# Patient Record
Sex: FEMALE | Race: WHITE | NOT HISPANIC OR LATINO | Employment: OTHER | URBAN - METROPOLITAN AREA
[De-identification: names, ages, dates, MRNs, and addresses within clinical notes are randomized per-mention and may not be internally consistent; named-entity substitution may affect disease eponyms.]

---

## 2021-07-07 LAB
ABO: NORMAL
RH TYPE: NORMAL

## 2023-05-03 PROBLEM — R04.2 HEMOPTYSIS: Status: ACTIVE | Noted: 2023-05-03

## 2023-05-03 PROBLEM — S62.336A CLOSED DISPLACED FRACTURE OF NECK OF RIGHT FIFTH METACARPAL BONE: Status: ACTIVE | Noted: 2023-05-03

## 2023-05-03 PROBLEM — J20.9 ACUTE BRONCHITIS: Status: RESOLVED | Noted: 2023-05-03 | Resolved: 2023-05-03

## 2023-05-03 PROBLEM — L21.9 SEBORRHEIC DERMATITIS OF SCALP: Status: ACTIVE | Noted: 2023-05-03

## 2023-05-03 PROBLEM — N73.9 PELVIC ABSCESS IN FEMALE: Status: ACTIVE | Noted: 2023-05-03

## 2023-05-03 PROBLEM — T81.49XA WOUND INFECTION AFTER SURGERY: Status: ACTIVE | Noted: 2023-05-03

## 2023-05-03 PROBLEM — A49.01 MSSA (METHICILLIN SUSCEPTIBLE STAPHYLOCOCCUS AUREUS) INFECTION: Status: ACTIVE | Noted: 2023-05-03

## 2023-05-03 PROBLEM — R63.5 WEIGHT GAIN: Status: ACTIVE | Noted: 2023-05-03

## 2023-05-03 PROBLEM — E55.9 HYPOVITAMINOSIS D: Status: ACTIVE | Noted: 2023-05-03

## 2023-05-03 PROBLEM — I97.89 NECROSIS OF SURGICAL WOUND (MULTI): Status: ACTIVE | Noted: 2023-05-03

## 2023-05-03 PROBLEM — M86.9: Status: ACTIVE | Noted: 2023-05-03

## 2023-05-03 PROBLEM — F31.9 BIPOLAR DEPRESSION (MULTI): Status: ACTIVE | Noted: 2023-05-03

## 2023-05-03 PROBLEM — S32.402A: Status: ACTIVE | Noted: 2023-05-03

## 2023-05-03 PROBLEM — I96 NECROSIS OF SURGICAL WOUND (MULTI): Status: ACTIVE | Noted: 2023-05-03

## 2023-05-03 PROBLEM — R33.9 URINARY RETENTION: Status: ACTIVE | Noted: 2023-05-03

## 2023-05-03 PROBLEM — M25.561 RIGHT KNEE PAIN: Status: ACTIVE | Noted: 2023-05-03

## 2023-05-03 PROBLEM — F17.210 TOBACCO DEPENDENCE DUE TO CIGARETTES: Status: RESOLVED | Noted: 2023-05-03 | Resolved: 2023-05-03

## 2023-05-03 PROBLEM — L30.9 ACUTE DERMATITIS: Status: RESOLVED | Noted: 2023-05-03 | Resolved: 2023-05-03

## 2023-05-03 PROBLEM — S72.009A FRACTURE, HIP (MULTI): Status: ACTIVE | Noted: 2023-05-03

## 2023-05-03 PROBLEM — M32.9 LUPUS (MULTI): Status: ACTIVE | Noted: 2023-05-03

## 2023-05-03 PROBLEM — R21 RASH: Status: RESOLVED | Noted: 2023-05-03 | Resolved: 2023-05-03

## 2023-05-03 RX ORDER — CYCLOBENZAPRINE HCL 5 MG
1 TABLET ORAL 3 TIMES DAILY PRN
COMMUNITY
Start: 2021-07-27 | End: 2023-07-13 | Stop reason: ALTCHOICE

## 2023-05-03 RX ORDER — ACETAMINOPHEN 500 MG
1 TABLET ORAL 2 TIMES DAILY
COMMUNITY
Start: 2021-07-23 | End: 2023-07-13 | Stop reason: ALTCHOICE

## 2023-05-03 RX ORDER — KETOCONAZOLE 20 MG/ML
1 SHAMPOO, SUSPENSION TOPICAL
COMMUNITY
Start: 2022-02-16 | End: 2024-05-08 | Stop reason: ALTCHOICE

## 2023-05-03 RX ORDER — ASPIRIN 325 MG
1 TABLET, DELAYED RELEASE (ENTERIC COATED) ORAL
COMMUNITY
Start: 2021-09-20 | End: 2023-07-13 | Stop reason: ALTCHOICE

## 2023-05-03 RX ORDER — ASPIRIN 81 MG/1
1 TABLET ORAL 2 TIMES DAILY
COMMUNITY
Start: 2021-09-20 | End: 2023-07-13 | Stop reason: ALTCHOICE

## 2023-05-03 RX ORDER — BENZONATATE 100 MG/1
CAPSULE ORAL SEE ADMIN INSTRUCTIONS
COMMUNITY
Start: 2021-06-29 | End: 2023-07-13 | Stop reason: ALTCHOICE

## 2023-06-28 ENCOUNTER — TELEPHONE (OUTPATIENT)
Dept: PRIMARY CARE | Facility: CLINIC | Age: 43
End: 2023-06-28
Payer: MEDICARE

## 2023-06-29 NOTE — TELEPHONE ENCOUNTER
Appt scheduled  
I do not see med in med list please advise  
Needs a refill on her Albuterol takes 1 as needed please send to [facility] in [place]   
General

## 2023-07-13 ENCOUNTER — OFFICE VISIT (OUTPATIENT)
Dept: PRIMARY CARE | Facility: CLINIC | Age: 43
End: 2023-07-13
Payer: MEDICARE

## 2023-07-13 VITALS
BODY MASS INDEX: 32.23 KG/M2 | HEIGHT: 69 IN | SYSTOLIC BLOOD PRESSURE: 110 MMHG | DIASTOLIC BLOOD PRESSURE: 60 MMHG | WEIGHT: 217.6 LBS | HEART RATE: 100 BPM | OXYGEN SATURATION: 97 % | RESPIRATION RATE: 16 BRPM

## 2023-07-13 DIAGNOSIS — Z13.220 SCREENING FOR LIPID DISORDERS: ICD-10-CM

## 2023-07-13 DIAGNOSIS — R06.02 EXERTIONAL SHORTNESS OF BREATH: ICD-10-CM

## 2023-07-13 DIAGNOSIS — R06.02 SOB (SHORTNESS OF BREATH) ON EXERTION: Primary | ICD-10-CM

## 2023-07-13 DIAGNOSIS — Z00.00 ANNUAL PHYSICAL EXAM: ICD-10-CM

## 2023-07-13 DIAGNOSIS — Z00.00 MEDICARE ANNUAL WELLNESS VISIT, SUBSEQUENT: Primary | ICD-10-CM

## 2023-07-13 DIAGNOSIS — Z12.31 ENCOUNTER FOR SCREENING MAMMOGRAM FOR MALIGNANT NEOPLASM OF BREAST: ICD-10-CM

## 2023-07-13 DIAGNOSIS — F17.290 VAPING NICOTINE DEPENDENCE, TOBACCO PRODUCT: ICD-10-CM

## 2023-07-13 DIAGNOSIS — E55.9 HYPOVITAMINOSIS D: ICD-10-CM

## 2023-07-13 DIAGNOSIS — Z13.29 SCREENING FOR THYROID DISORDER: ICD-10-CM

## 2023-07-13 DIAGNOSIS — Z71.89 ADVANCE DIRECTIVE DISCUSSED WITH PATIENT: ICD-10-CM

## 2023-07-13 DIAGNOSIS — D64.9 ANEMIA, UNSPECIFIED TYPE: ICD-10-CM

## 2023-07-13 DIAGNOSIS — R73.9 HYPERGLYCEMIA: ICD-10-CM

## 2023-07-13 PROCEDURE — 99214 OFFICE O/P EST MOD 30 MIN: CPT | Performed by: NURSE PRACTITIONER

## 2023-07-13 PROCEDURE — G0439 PPPS, SUBSEQ VISIT: HCPCS | Performed by: NURSE PRACTITIONER

## 2023-07-13 PROCEDURE — 1036F TOBACCO NON-USER: CPT | Performed by: NURSE PRACTITIONER

## 2023-07-13 PROCEDURE — 3008F BODY MASS INDEX DOCD: CPT | Performed by: NURSE PRACTITIONER

## 2023-07-13 PROCEDURE — 99406 BEHAV CHNG SMOKING 3-10 MIN: CPT | Performed by: NURSE PRACTITIONER

## 2023-07-13 PROCEDURE — 99497 ADVNCD CARE PLAN 30 MIN: CPT | Performed by: NURSE PRACTITIONER

## 2023-07-13 PROCEDURE — G0447 BEHAVIOR COUNSEL OBESITY 15M: HCPCS | Performed by: NURSE PRACTITIONER

## 2023-07-13 RX ORDER — QUETIAPINE FUMARATE 50 MG/1
0.5 TABLET, FILM COATED ORAL 3 TIMES DAILY PRN
COMMUNITY

## 2023-07-13 RX ORDER — ALBUTEROL SULFATE 0.83 MG/ML
2.5 SOLUTION RESPIRATORY (INHALATION) 4 TIMES DAILY PRN
Qty: 540 ML | Refills: 3 | Status: SHIPPED | OUTPATIENT
Start: 2023-07-13 | End: 2024-01-09

## 2023-07-13 RX ORDER — QUETIAPINE FUMARATE 400 MG/1
1 TABLET, FILM COATED ORAL NIGHTLY
COMMUNITY
Start: 2023-07-09

## 2023-07-13 ASSESSMENT — ANXIETY QUESTIONNAIRES
2. NOT BEING ABLE TO STOP OR CONTROL WORRYING: NOT AT ALL
1. FEELING NERVOUS, ANXIOUS, OR ON EDGE: NOT AT ALL
7. FEELING AFRAID AS IF SOMETHING AWFUL MIGHT HAPPEN: NOT AT ALL
GAD7 TOTAL SCORE: 0
4. TROUBLE RELAXING: NOT AT ALL
5. BEING SO RESTLESS THAT IT IS HARD TO SIT STILL: NOT AT ALL
6. BECOMING EASILY ANNOYED OR IRRITABLE: NOT AT ALL
3. WORRYING TOO MUCH ABOUT DIFFERENT THINGS: NOT AT ALL

## 2023-07-13 ASSESSMENT — ENCOUNTER SYMPTOMS
OCCASIONAL FEELINGS OF UNSTEADINESS: 0
DEPRESSION: 0
SHORTNESS OF BREATH: 1
LOSS OF SENSATION IN FEET: 0

## 2023-07-13 ASSESSMENT — PATIENT HEALTH QUESTIONNAIRE - PHQ9
2. FEELING DOWN, DEPRESSED OR HOPELESS: NOT AT ALL
1. LITTLE INTEREST OR PLEASURE IN DOING THINGS: NOT AT ALL
SUM OF ALL RESPONSES TO PHQ9 QUESTIONS 1 AND 2: 0

## 2023-07-13 ASSESSMENT — PAIN SCALES - GENERAL: PAINLEVEL: 4

## 2023-07-13 NOTE — PATIENT INSTRUCTIONS
I have a STAT X-rays for you to complete tomorrow. I have ordered albuterol and nebulizer supplies to use until your pulmonary function test and referral to consult with pulmonology. I have ordered screening mammogram.  Follow up in 3 months or as needed.

## 2023-07-14 NOTE — PROGRESS NOTES
"Subjective   Patient ID: Chloe Mclean is a 43 y.o. female who presents for Follow-up (Hard time breathing, insurance bre wants low dose ct scan).    Patient is accompanied by her elder sister following up for annual physical exam.  She presents with complaint of difficulties breathing.  Reports that the insurance company wants to get a little.  CT scan lung screening.  However, per the guidelines she does not qualify because she is just 43 years old.  Only individual between the ages of 50-77 qualifies for low-dose CT scan lungs  screening.  Patient reports that she has switched from smoking to vaping nicotine-containing products.  Reports that she has a nebulizer machine at home but does not have the liquid medication to use for it.  [facility].         Review of Systems   Respiratory:  Positive for shortness of breath.    All other systems reviewed and are negative.      Objective   /60 (BP Location: Right arm, Patient Position: Sitting, BP Cuff Size: Adult)   Pulse 100   Resp 16   Ht 1.753 m (5' 9\")   Wt 98.7 kg (217 lb 9.6 oz)   SpO2 97%   BMI 32.13 kg/m²     Physical Exam  Constitutional:       Appearance: Normal appearance.   HENT:      Head: Normocephalic and atraumatic.      Right Ear: External ear normal.      Left Ear: External ear normal.      Nose: Nose normal.      Mouth/Throat:      Mouth: Mucous membranes are moist.   Cardiovascular:      Rate and Rhythm: Normal rate and regular rhythm.      Pulses: Normal pulses.      Heart sounds: Normal heart sounds.   Pulmonary:      Effort: Pulmonary effort is normal.      Breath sounds: Normal breath sounds.   Abdominal:      General: Bowel sounds are normal.      Palpations: Abdomen is soft.   Musculoskeletal:      Cervical back: Neck supple.   Skin:     General: Skin is warm and dry.   Neurological:      Mental Status: She is alert and oriented to person, place, and time. "   Psychiatric:         Mood and Affect: Mood normal.         Behavior: Behavior normal.         Thought Content: Thought content normal.         Judgment: Judgment normal.         Assessment/Plan   Problem List Items Addressed This Visit       Hypovitaminosis D - Primary    Relevant Orders    Vitamin D 25-Hydroxy,Total     Other Visit Diagnoses       Exertional shortness of breath        Relevant Medications    albuterol 2.5 mg /3 mL (0.083 %) nebulizer solution    Other Relevant Orders    XR chest 2 views    Referral to Pulmonology    Follow Up In [facility] - PCP - Established    Screening for lipid disorders        Relevant Orders    Lipid panel    Screening for thyroid disorder        Encounter for screening mammogram for malignant neoplasm of breast        Relevant Orders    BI mammo bilateral screening tomosynthesis    Annual physical exam        Anemia, unspecified type        Relevant Orders    CBC    Vitamin B12    Hyperglycemia        Relevant Orders    Comprehensive Metabolic Panel

## 2023-07-17 ENCOUNTER — LAB (OUTPATIENT)
Dept: LAB | Facility: LAB | Age: 43
End: 2023-07-17
Payer: MEDICARE

## 2023-07-17 ENCOUNTER — TELEPHONE (OUTPATIENT)
Dept: PRIMARY CARE | Facility: CLINIC | Age: 43
End: 2023-07-17

## 2023-07-17 DIAGNOSIS — Z13.220 SCREENING FOR LIPID DISORDERS: ICD-10-CM

## 2023-07-17 DIAGNOSIS — E55.9 HYPOVITAMINOSIS D: ICD-10-CM

## 2023-07-17 DIAGNOSIS — E55.9 HYPOVITAMINOSIS D: Primary | ICD-10-CM

## 2023-07-17 LAB
CALCIDIOL (25 OH VITAMIN D3) (NG/ML) IN SER/PLAS: 20 NG/ML
CHOLESTEROL (MG/DL) IN SER/PLAS: 168 MG/DL (ref 0–199)
CHOLESTEROL IN HDL (MG/DL) IN SER/PLAS: 42.1 MG/DL
CHOLESTEROL/HDL RATIO: 4
LDL: 111 MG/DL (ref 0–99)
TRIGLYCERIDE (MG/DL) IN SER/PLAS: 75 MG/DL (ref 0–149)
VLDL: 15 MG/DL (ref 0–40)

## 2023-07-17 PROCEDURE — 80061 LIPID PANEL: CPT

## 2023-07-17 PROCEDURE — 36415 COLL VENOUS BLD VENIPUNCTURE: CPT

## 2023-07-17 PROCEDURE — 82306 VITAMIN D 25 HYDROXY: CPT

## 2023-07-17 RX ORDER — ERGOCALCIFEROL 1.25 MG/1
50000 CAPSULE ORAL
Qty: 12 CAPSULE | Refills: 2 | Status: SHIPPED | OUTPATIENT
Start: 2023-07-17 | End: 2024-04-12

## 2023-07-17 NOTE — TELEPHONE ENCOUNTER
----- Message from JAMSHID You-CNP sent at 7/17/2023  2:33 PM EDT -----  Please tell patient that her vitamin D level is low and I have prescribed vitamin D2 50,000 unit weekly for her to take.

## 2023-07-17 NOTE — TELEPHONE ENCOUNTER
Patient notified of vit D level and prescription is asking about CXR results performed today she stated please call with results [phone]

## 2023-07-18 NOTE — TELEPHONE ENCOUNTER
----- Message from JAMSHID You-CNP sent at 7/17/2023  7:24 PM EDT -----  Please tell patient that her mammogram result is normal and I recommend routine annual screening mammogram.

## 2023-09-11 ENCOUNTER — PATIENT OUTREACH (OUTPATIENT)
Dept: PRIMARY CARE | Facility: CLINIC | Age: 43
End: 2023-09-11
Payer: MEDICARE

## 2023-09-11 NOTE — PROGRESS NOTES
Pt declining TCM services at this time. Pt stating she is doing well and is set to see her dentist in the near future. Pt has no questions that needed addressed at this time.

## 2023-10-17 ENCOUNTER — APPOINTMENT (OUTPATIENT)
Dept: PRIMARY CARE | Facility: CLINIC | Age: 43
End: 2023-10-17
Payer: MEDICARE

## 2023-11-29 DIAGNOSIS — G47.33 OSA (OBSTRUCTIVE SLEEP APNEA): Primary | ICD-10-CM

## 2023-12-01 ENCOUNTER — APPOINTMENT (OUTPATIENT)
Dept: PULMONOLOGY | Facility: HOSPITAL | Age: 43
End: 2023-12-01
Payer: MEDICARE

## 2023-12-20 ENCOUNTER — CLINICAL SUPPORT (OUTPATIENT)
Dept: SLEEP MEDICINE | Facility: CLINIC | Age: 43
End: 2023-12-20
Payer: MEDICARE

## 2023-12-20 DIAGNOSIS — G47.30 SLEEP APNEA, UNSPECIFIED: ICD-10-CM

## 2023-12-20 DIAGNOSIS — R06.83 SNORING: ICD-10-CM

## 2023-12-20 DIAGNOSIS — G47.33 OSA (OBSTRUCTIVE SLEEP APNEA): ICD-10-CM

## 2023-12-20 PROCEDURE — 95806 SLEEP STUDY UNATT&RESP EFFT: CPT | Performed by: INTERNAL MEDICINE

## 2023-12-20 NOTE — PROGRESS NOTES
Type of Study: HOME SLEEP STUDY - NOMAD     The patient received equipment and instructions for use of the [employer] Nomad HSAT device. The patient was instructed how to apply the effort belts, cannula, thermistor. It was also explained how the Nomad and oximeter components work.  The patient was asked to record their sleep for an 8-hour period.     The patient was informed of their responsibility for the device and acknowledged this by signing the HSAT device contract. The patient was asked to return the device on 12/21/2023 by 10 AM to the Respiratory Care Department at [facility].     The patient was instructed to call 911 as usual for any medical- emergencies while at home.  The patient was also given a phone number for troubleshooting when using the device in case there were additional questions.

## 2023-12-22 ENCOUNTER — TELEPHONE (OUTPATIENT)
Dept: PULMONOLOGY | Facility: HOSPITAL | Age: 43
End: 2023-12-22
Payer: MEDICARE

## 2023-12-22 NOTE — TELEPHONE ENCOUNTER
Patient acknowledged understanding and was given a follow up appointment at this time.    ----- Message from Edilberto King MD sent at 12/22/2023  2:32 PM EST -----  Please advise patient that sleep study did not show significant sleep apnea or oxygen desaturations. Mild snoring is noted. Recommend weight management in the meanwhile and keep follow up appointment to further discuss. No indication at present for CPAP.

## 2024-01-29 ENCOUNTER — APPOINTMENT (OUTPATIENT)
Dept: PULMONOLOGY | Facility: HOSPITAL | Age: 44
End: 2024-01-29
Payer: MEDICARE

## 2024-03-20 ENCOUNTER — TELEPHONE (OUTPATIENT)
Dept: PRIMARY CARE | Facility: CLINIC | Age: 44
End: 2024-03-20
Payer: MEDICARE

## 2024-03-21 DIAGNOSIS — M25.552 LEFT HIP PAIN: Primary | ICD-10-CM

## 2024-04-01 ENCOUNTER — OFFICE VISIT (OUTPATIENT)
Dept: ORTHOPEDIC SURGERY | Facility: CLINIC | Age: 44
End: 2024-04-01
Payer: MEDICARE

## 2024-04-01 ENCOUNTER — HOSPITAL ENCOUNTER (OUTPATIENT)
Dept: RADIOLOGY | Facility: HOSPITAL | Age: 44
Discharge: HOME | End: 2024-04-01
Payer: MEDICARE

## 2024-04-01 ENCOUNTER — PREP FOR PROCEDURE (OUTPATIENT)
Dept: ORTHOPEDIC SURGERY | Facility: CLINIC | Age: 44
End: 2024-04-01

## 2024-04-01 VITALS — WEIGHT: 230 LBS | HEIGHT: 69 IN | BODY MASS INDEX: 34.07 KG/M2

## 2024-04-01 DIAGNOSIS — M16.12 PRIMARY OSTEOARTHRITIS OF LEFT HIP: Primary | ICD-10-CM

## 2024-04-01 DIAGNOSIS — M25.552 LEFT HIP PAIN: ICD-10-CM

## 2024-04-01 DIAGNOSIS — M25.352 INSTABILITY OF LEFT HIP JOINT: ICD-10-CM

## 2024-04-01 PROCEDURE — 99204 OFFICE O/P NEW MOD 45 MIN: CPT | Performed by: ORTHOPAEDIC SURGERY

## 2024-04-01 PROCEDURE — 73502 X-RAY EXAM HIP UNI 2-3 VIEWS: CPT | Mod: LEFT SIDE | Performed by: RADIOLOGY

## 2024-04-01 PROCEDURE — 73502 X-RAY EXAM HIP UNI 2-3 VIEWS: CPT | Mod: LT

## 2024-04-01 PROCEDURE — 3008F BODY MASS INDEX DOCD: CPT | Performed by: ORTHOPAEDIC SURGERY

## 2024-04-01 ASSESSMENT — PAIN - FUNCTIONAL ASSESSMENT: PAIN_FUNCTIONAL_ASSESSMENT: 0-10

## 2024-04-01 ASSESSMENT — PAIN SCALES - GENERAL: PAINLEVEL_OUTOF10: 4

## 2024-04-01 NOTE — PROGRESS NOTES
PRIMARY CARE PHYSICIAN: MARTY You  REFERRING PROVIDER: JAMSHID You-CLEMENCIA  [address], [address]  [place] [address]     CONSULT ORTHOPAEDIC: Hip Evaluation        ASSESSMENT & PLAN    IMPRESSION:  1.  Posttraumatic arthritis of the left hip    PLAN:  Discussed the patient findings above.  Reviewed x-rays with her.  She does have severe arthritic changes in the left hip with underlying hardware from previous fracture fixation of acetabular injury.  We discussed that given the severity of her changes that there is nothing short of a hip replacement that would give her the most predictable outcome at this point.  We did additionally discuss that with her retained hardware would recommend doing the surgery with robotic assistance to help decrease the removal of retained metal hardware.  She does feel that her quality of life is significantly affected at this time would like to proceed with surgical invention.    Chloe Mclean has radiographic and physical exam evidence of degenerative joint disease and wishes to pursue surgery. The patient appears to have sufficient symptoms to warrant surgical intervention and is an appropriate candidate for left Total Hip Arthroplasty with possible hardware removal as evidenced by six months of unsuccessful non-operative treatment as outlined in the HPI, progressive symptoms including pain impacting sleep or causing fatigue, pain impacting work, pain worsened with weight bearing, and pain limiting ability to stay fit and healthy.     We had a lengthy discussion regarding the risk and benefit of surgery, the alternatives, limitations, and personnel involved. The include but are not limited to infection, persistent pain, instability, nerve injury, blood clots, and medical complications. We also discussed the pre-operative course, surgery itself, and rehabilitation. Perioperative blood management and transfusion issues were  discussed, and options clearly outlined. The patient consented to the use of allogenic blood if medically necessary.     The patient has elected to schedule surgery at this time or intents to call the office with a surgical date. Shared decision making occurred while obtaining informed consent. The patient with be scheduled for a pre-operative education class. The patient will be ordered appropriate preoperative labs to be completed for preadmission testing.     Robotic Assisted Surgery: Yes. The use of robotic assisted surgery was discussed with the patient.  The risk, benefits were discussed regarding this.  Patient consented for this procedure.  We discussed specifically placement of pins and arrays for navigation during surgery and to help with the robotic assistance.  We discussed the use of an additional bandage to cover the pin sites.  We also reviewed that they may have some slight pain at the placement of pin sites that should improve in the same fashion as their general recovery of the joint replacement.    We discussed the term robot, when used to describe the PASTOR system, refers to the [employer] robotic arm. The PASTOR System is not a robot, but a surgeon-controlled robotic-arm assisted device.      - Preoperative Consults: PAT Clearance, pulmonary clearance  - Disposition: Extended recovery  - Anesthesia Plan: Spinal, local  - Implants: Lubna Pastor, Synthes screw removal available  - Physical Therapy Plan: Home  - [id number]: Yes  - Surgical Approach: Direct superior  - DVT PPx: Xarelto due to history of blood clots after her right knee replacement    Risk Assessment for Post-Op Antibiotics   -Postoperative anticoagulation use    I hereby indicate that these comorbidities may have a detrimental effect on this arthroplasty.   - Previous ORIF hip [M16.51, M16.52 Unilateral post-traumatic osteoarthritis right hip, Unilateral post-traumatic osteoarthritis left hip]          SUBJECTIVE  CHIEF COMPLAINT:   Chief  Complaint   Patient presents with    Left Hip - Pain        HPI: Chloe Mclean is a 44 y.o. patient. Chloe Mclean has had progressive problems with the left hip over the past 3 years. They do report any trauma, after she sustained a pelvic fracture 3 years ago. They do report any constant or progressive numbness or tingling in their legs. Their symptoms are interfering with activities which include pain on the lateral side of her hip and into her groin, but also around to her butt.  She has pain getting up from a chair and walking up and down stairs.   She states the instability is getting worse     FUNCTIONAL STATUS: limited on a day-to-day basis  AMBULATORY STATUS:  walker as needed   PREVIOUS TREATMENTS:  tylenol and ibuprofen  HISTORY OF SURGERY ON AFFECTED HIP(S): Yes Pelvic fracture 07/2021  BACK PAIN REPORTED: No       REVIEW OF SYSTEMS  Constitutional: See HPI for pain assessment, No significant weight loss, recent trauma  Cardiovascular: No chest pain, shortness of breath  Respiratory: No difficulty breathing, cough  Gastrointestinal: No nausea, vomiting, diarrhea, constipation  Musculoskeletal: Noted in HPI, positive for pain, restricted motion, stiffness  Integumentary: No rashes, easy bruising, redness   Neurological: no numbness or tingling in extremities, no gait disturbances   Psychiatric: No mood changes, memory changes, social issues  Heme/Lymph: no excessive swelling, easy bruising, excessive bleeding  ENT: No hearing changes  Eyes: No vision changes    Past Medical History:   Diagnosis Date    Acute bronchitis 05/03/2023    Acute dermatitis 05/03/2023        Allergies   Allergen Reactions    Iodine Unknown    Latex Rash        Past Surgical History:   Procedure Laterality Date    OTHER SURGICAL HISTORY  11/05/2019    Appendectomy    OTHER SURGICAL HISTORY  11/05/2019    Hysterectomy    OTHER SURGICAL HISTORY  11/05/2019    Gallbladder surgery    OTHER SURGICAL HISTORY  07/23/2021    Lung  surgery    OTHER SURGICAL HISTORY  07/23/2021    Hip surgery        No family history on file.     Social History     Socioeconomic History    Marital status: Single     Spouse name: Not on file    Number of children: Not on file    Years of education: Not on file    Highest education level: Not on file   Occupational History    Not on file   Tobacco Use    Smoking status: Former     Types: Cigarettes    Smokeless tobacco: Never    Tobacco comments:     Vapping nicotine containing products    Vaping Use    Vaping Use: Every day    Substances: Nicotine    Devices: Disposable   Substance and Sexual Activity    Alcohol use: Never    Drug use: Never    Sexual activity: Not on file   Other Topics Concern    Not on file   Social History Narrative    Not on file     Social Determinants of Health     Financial Resource Strain: Not on file   Food Insecurity: Not on file   Transportation Needs: Not on file   Physical Activity: Not on file   Stress: Not on file   Social Connections: Not on file   Intimate Partner Violence: Not on file   Housing Stability: Not on file        CURRENT MEDICATIONS:   Current Outpatient Medications   Medication Sig Dispense Refill    albuterol 2.5 mg /3 mL (0.083 %) nebulizer solution Take 3 mL (2.5 mg) by nebulization 4 times a day as needed for wheezing or shortness of breath. 540 mL 3    ergocalciferol (Vitamin D-2) 1.25 MG (25784 UT) capsule Take 1 capsule (50,000 Units) by mouth 1 (one) time per week. 12 capsule 2    ketoconazole (NIZOral) 2 % shampoo 1 Application once daily.      QUEtiapine (SEROquel) 400 mg tablet Take 1 tablet (400 mg) by mouth once daily at bedtime.      QUEtiapine (SEROquel) 50 mg tablet Take 1 tablet (50 mg) by mouth 3 times a day as needed.       No current facility-administered medications for this visit.        OBJECTIVE    PHYSICAL EXAM      6/29/2021     4:02 PM 7/23/2021    11:19 AM 8/8/2021    12:19 PM 10/14/2021     4:16 PM 10/29/2021    10:28 AM 7/13/2023      "5:19 PM 7/31/2023    11:40 AM   Vitals   Systolic 112 110 119  116 110 124   Diastolic 80 72 79  87 60 78   Heart Rate 107 98 90  90 100 114   Temp   36.9 °C (98.4 °F)    36.4 °C (97.5 °F)   Resp 16 16 16   16 18   Height (in) 1.753 m (5' 9\") 1.753 m (5' 9\")  1.752 m (5' 8.98\") 1.753 m (5' 9\") 1.753 m (5' 9\") 1.753 m (5' 9\")   Weight (lb) 234   229.94 240 217.6 219   BMI 34.56 kg/m2 34.56 kg/m2  33.98 kg/m2 35.44 kg/m2 32.13 kg/m2 32.34 kg/m2   BSA (m2) 2.27 m2 2.27 m2  2.25 m2 2.3 m2 2.19 m2 2.2 m2   Visit Report      Report       There is no height or weight on file to calculate BMI.    GENERAL: A/Ox3, NAD. Appears healthy, well nourished  PSYCHIATRIC: Mood stable, appropriate memory recall  EYES: EOM intact, no scleral icterus  CARDIAC: regular rate  LUNGS: Breathing non-labored  SKIN: no erythema, rashes, or ecchymoses     MUSCULOSKELETAL:  Laterality: left Hip Exam  -Anteriorly based incision is well-healed no surrounding erythema or wound breakdown  - ROM, Extension: full, no flexion contracture  - Strength: Abduction 5/5 against resitance, Flexion 5/5  - Palpation: mild TTP along greater trochanter  - Log roll/IR exam: painful and limited motion. Pain with hip flexion past 90 degrees and internal rotation  - Straight leg raise: negative  - EHL/PF/DF motor intact  - Gait: antalgic to arthritic side, negative Trendelenburg gait   - Special Tests: none performed    NEUROVASCULAR:  - Neurovascular Status: sensation intact to light touch distally  - Capillary refill brisk at extremities, Bilateral dorsalis pedis pulse 2+            IMAGING:  Multiple views of the affected left hip(s) demonstrate: Previous hardware fixation of acetabular fracture with no signs of nonunion or malunion.  Severe arthritic changes on the left hip with complete joint space narrowing, subchondral sclerosis, subchondral cystic change and large osteophytic change..   X-rays were personally reviewed and interpreted by me.  Radiology reports " were reviewed by me as well, if readily available at the time.        Ramya Rendon DO  Attending Surgeon  Joint Replacement and Adult Reconstructive Surgery  [place], [place]

## 2024-04-05 ENCOUNTER — APPOINTMENT (OUTPATIENT)
Dept: PULMONOLOGY | Facility: HOSPITAL | Age: 44
End: 2024-04-05
Payer: MEDICARE

## 2024-04-08 ENCOUNTER — OFFICE VISIT (OUTPATIENT)
Dept: PULMONOLOGY | Facility: HOSPITAL | Age: 44
End: 2024-04-08
Payer: MEDICARE

## 2024-04-08 VITALS
DIASTOLIC BLOOD PRESSURE: 74 MMHG | HEIGHT: 68 IN | TEMPERATURE: 96.9 F | WEIGHT: 238.6 LBS | BODY MASS INDEX: 36.16 KG/M2 | SYSTOLIC BLOOD PRESSURE: 111 MMHG | OXYGEN SATURATION: 97 % | RESPIRATION RATE: 16 BRPM | HEART RATE: 110 BPM

## 2024-04-08 DIAGNOSIS — J30.2 SEASONAL ALLERGIC RHINITIS, UNSPECIFIED TRIGGER: ICD-10-CM

## 2024-04-08 DIAGNOSIS — F12.90 MARIJUANA SMOKER: ICD-10-CM

## 2024-04-08 DIAGNOSIS — F17.290 VAPING NICOTINE DEPENDENCE, TOBACCO PRODUCT: ICD-10-CM

## 2024-04-08 DIAGNOSIS — J44.9 CHRONIC OBSTRUCTIVE PULMONARY DISEASE, UNSPECIFIED COPD TYPE (MULTI): Primary | ICD-10-CM

## 2024-04-08 DIAGNOSIS — J45.40 MODERATE PERSISTENT ASTHMA WITHOUT COMPLICATION (HHS-HCC): ICD-10-CM

## 2024-04-08 PROCEDURE — 99214 OFFICE O/P EST MOD 30 MIN: CPT | Performed by: INTERNAL MEDICINE

## 2024-04-08 PROCEDURE — 1036F TOBACCO NON-USER: CPT | Performed by: INTERNAL MEDICINE

## 2024-04-08 PROCEDURE — 3008F BODY MASS INDEX DOCD: CPT | Performed by: INTERNAL MEDICINE

## 2024-04-08 RX ORDER — ALBUTEROL SULFATE 90 UG/1
2 AEROSOL, METERED RESPIRATORY (INHALATION) EVERY 4 HOURS PRN
Qty: 8.5 G | Refills: 11 | Status: SHIPPED | OUTPATIENT
Start: 2024-04-08 | End: 2025-04-08

## 2024-04-08 RX ORDER — FLUTICASONE PROPIONATE AND SALMETEROL 250; 50 UG/1; UG/1
1 POWDER RESPIRATORY (INHALATION)
Qty: 1 EACH | Refills: 11 | Status: SHIPPED | OUTPATIENT
Start: 2024-04-08

## 2024-04-08 ASSESSMENT — COLUMBIA-SUICIDE SEVERITY RATING SCALE - C-SSRS
2. HAVE YOU ACTUALLY HAD ANY THOUGHTS OF KILLING YOURSELF?: NO
1. IN THE PAST MONTH, HAVE YOU WISHED YOU WERE DEAD OR WISHED YOU COULD GO TO SLEEP AND NOT WAKE UP?: NO
6. HAVE YOU EVER DONE ANYTHING, STARTED TO DO ANYTHING, OR PREPARED TO DO ANYTHING TO END YOUR LIFE?: NO

## 2024-04-08 ASSESSMENT — ENCOUNTER SYMPTOMS
OCCASIONAL FEELINGS OF UNSTEADINESS: 0
LOSS OF SENSATION IN FEET: 0
COUGH: 1
DEPRESSION: 0

## 2024-04-08 ASSESSMENT — PATIENT HEALTH QUESTIONNAIRE - PHQ9
1. LITTLE INTEREST OR PLEASURE IN DOING THINGS: NOT AT ALL
SUM OF ALL RESPONSES TO PHQ9 QUESTIONS 1 AND 2: 0
2. FEELING DOWN, DEPRESSED OR HOPELESS: NOT AT ALL

## 2024-04-08 NOTE — PROGRESS NOTES
Subjective   Patient ID: Chloe Mclean is a 44 y.o. female who presents for COPD (Patient here for follow up visit. Patient states her breathing is fine. Patient admits to an occasional dry cough. Patient denies any oxygen or cpap use at home. Patient vapes every day. Patient going for hip surgery needing clearance. Patient denies use of albuterol. Patient has no other concerns for today.).  HPI  Ms. Mclean is a 44 yo female with PMH of COPD is here to establish with pulmonary. She states that she was seeing Dr. Ruelas in the past. She started smoking at age of 11 and smoked 1ppd and up to 2 ppd for 10 years but quit in November 2022. She is now vaping instead with nicotine. She does not smoke THC and does not do any illicit drugs. She denies drinking alcohol. 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 She states she had pneumothorax and states required surgery in July 2021. She gets SOB with steps and strenuous activity. She has occasional cough and sometimes productive but usually dry. Hot shower bring up her cough with dark phlegm. She also states was diagnosed with Asthma as a child. Her triggers are usually heat, humidity, dust and smoke. She states she used to get Remicade with Dr. Juarez for psoriatic arthritis. She snores endorsed by her friend and has EDS and fatigue too. She states she has DASHA and was given CPAP but did not use it. She has lost about 90 lbs. She was [facility] at [facility] for pneumothorax in 2021.     Patient is here for follow-up.  She reports she is doing well and has not even taken Symbicort for couple months.  She denies shortness of breath.  She has occasional cough and postnasal drip.  She has not been needing Flonase however.  She had a home sleep study that only showed primary snoring.  She denies any sleepiness or tiredness at this time.    Review of Systems   HENT:  Positive for postnasal drip.    Respiratory:  Positive for cough.     All other systems reviewed and are negative.      Objective   Physical Exam  Vitals and nursing note reviewed.   Constitutional:       Appearance: Normal appearance.   HENT:      Head: Normocephalic.      Nose: Nose normal.      Mouth/Throat:      Pharynx: Oropharynx is clear.   Eyes:      Extraocular Movements: Extraocular movements intact.      Conjunctiva/sclera: Conjunctivae normal.      Pupils: Pupils are equal, round, and reactive to light.   Cardiovascular:      Rate and Rhythm: Normal rate and regular rhythm.      Pulses: Normal pulses.      Heart sounds: Normal heart sounds.   Pulmonary:      Effort: Pulmonary effort is normal.      Breath sounds: Normal breath sounds.   Abdominal:      General: Bowel sounds are normal.      Palpations: Abdomen is soft.   Musculoskeletal:         General: Normal range of motion.      Cervical back: Normal range of motion.   Skin:     General: Skin is warm.   Neurological:      General: No focal deficit present.      Mental Status: She is alert and oriented to person, place, and time. Mental status is at baseline.   Psychiatric:         Mood and Affect: Mood normal.         Behavior: Behavior normal.       Assessment/Plan   1. COPD  2. Asthma  3. DASHA  4. Allergic rhinitis  5. Obesity  6. Hx of pneumothorax  7. Marijuana smoking  8. Nicotine vaping     Plan:  Patient reports cough & GREEN are controlled on symbicort although she is not regular with it. She has not needed albuterol inhaler. She is here for preop clearance too.     -PFTs July 2023: FEV1 79%, BDR in mid expiratory flow rates, % and %.   -CXR with hyperinflation and air trapping  -Recommend take Symbicort daily. Ventolin prn.   -Discussed HST: primary snoring. Reports EDS and fatigue is improved. Discussed that if recurs, should get an in-lab PSG.  -Take Flonase nasal spray for allergic rhinitis  -weight management counseling  -counseled to stop VAPING asap.   -LDCT chest starting at age of 48.      Respiratory symptoms are scant and occ cough is related to vaping, allergic rhinitis and underlying COPD and asthma.  She does not have shortness of breath.  She is going to get her hip replacement.  She is mild to moderate risk for perioperative respiratory complications including pneumonia, hypoxia, worsening cough or shortness of breath.  She understands that but would like to proceed due to significant hip pain.  There is no contraindication to proceed with surgery as benefit outweigh the risks.  I did advise her to stop vaping.  I advised her to start Symbicort and take it regularly to reduce risk of postoperative bronchospasm and respiratory symptoms.

## 2024-04-08 NOTE — PATIENT INSTRUCTIONS
1. Please take Symbicort 2 puffs in the morning and in the evening.  2. Take flonase nasal spray 1 in each nostril daily.   3. Take albuterol inhaler as needed for shortness of breath.  4. Please STOP VAPING as discussed.   5. Follow up with  in 1 year.

## 2024-04-29 NOTE — PROGRESS NOTES
Thank you for attending our Joint Replacement class today in preparation for your upcoming surgery.  Topics discussed include:    MyChart Enrollment  Communication with Care Team  My Chart is the best form of communication to reach all of your caregivers  You can send messages to specific care givers, or a care team  Continued Education  You will be enrolled in a Total Joint Replacement care plan to receive additional education before and after surgery  You can review a short recording of the class content  Access to Medical Records  You can access test results, office notes, appointments, etc.  You can connect to other healthcare systems who use [employer] ([facility], [facility], [facility], etc.)  [facility]  Program Information  Consent to Enroll    Background/Understanding of Joint Replacement Surgery  Potential for same day discharge  Any questions or concerns to be directed to the surgeon's office    How to Prepare for Surgery  Use of Nicotine Products/Smoking  Stop several weeks before surgery  Such products slow down the healing process and increase risk of post-op infection and complications  Clearance for Surgery  Medical Clearance by Specialists  Dental Clearance  Cracked/Broken/Loose teeth left untreated may postpone surgery  The importance of post-op antibiotics for dental visits per surgeon protocol  Preadmission Testing  **Potential for postponed surgery if appropriate clearance is not obtained  Medication Instruction  Follow instructions provided by the doctor who prescribes your medication (typically, but not limited to cardiologist)  Preadmission testing will provide additional instructions during your appointment on what to stop and what to take as you get closer to surgery  For clarification of these instructions, please call preadmission testing directly - [phone]  Tips for Preparing the home for discharge from the hospital  Care Partner  Requirement for surgery, the patient must have a plan to have  help at home  Potential for postponed surgery if plan for home support cannot be established  How the care partner can help after surgery  CHG Body Wash/Mouth Wash  Follow the instructions given at preadmission testing  Body wash is to be used on the body and hair for 5 washes  Mouthwash is to be used the night before and morning of surgery  **This is a system-wide protocol developed by infectious disease professionals, we will not alter our recommendations for those with sensitive skin or those who have special hair needs.  Please follow the instructions as they are written as this will provide the best infection prevention measures for surgery.  Should you have an allergy to one of the products, please discuss with your preadmission team**    What to Expect in the Hospital/At Home  Morning of Surgery NPO Guidelines  Nothing to eat after midnight  Water can be consumed up to 2 hours prior to arrival  Surgical and Post-Surgical Care Team  Surgical Team  Anesthesia Team  Nursing  Physical Therapy  Care Coordinating  Pharmacy  Hospital Arrival Instructions  Arrive at the time provided to you  Consider traffic patterns (rush-hour) based on arrival time  Have arrangements made for a ride home  If discharging same day, care partner should remain at the hospital  Recovering after Surgery  Recovery Room - Visitors are not brought back  Transition to hospital room - 2nd Floor, Visitors will be directed to your room  The presence of and strategies for controlling surgical pain and swelling  The importance of early mobility  Side effects after surgery  What to expect if staying overnight    Discharge Planning  The intended plan for discharge will be for patients to discharge home  All patients require a care partner (family, friend, neighbor, etc.) to stay with the patient for the first few nights after surgery  The inability to secure help at home will postpone surgery  Home Care Services set up per surgeon order  Physical  Therapy  Occupational Therapy  **If desired, private duty care can be arranged by the patient ahead of time**  Outpatient Physical Therapy per surgeon order    Recovering at Home  Wound Care  Follow wound care instructions found in your discharge paperwork  Bandage is water-resistant and you may shower with the bandage  Do not scrub directly over the bandage  Do not submerge in water until cleared (bathtub, hot tub, pool, etc.)    Post-Op Risk Prevention  Infection Prevention  Promptly seek treatment for any infections post-operatively  Routine dental visits must be postponed for 3 months after surgery  Your surgeon may require antibiotics prior to future dental visits  Any concerns for infection not related directly to the knee or the hip should be managed by your primary care provider  Blood Clots  Be sure to complete the course of blood thinning medication as prescribed by your surgeon  Movement every 1-2 hours during the day is encouraged to prevent blood clots  Monitor for signs of blood clots  Wear compression stockings as prescribed by your surgeon  Constipation  Constipation is common following surgery  Drink plenty of fluids  Take stool softener/laxative as prescribed by your surgeon  Move around frequently  Eat foods high in fiber  Fall Prevention  Prepare home ahead of time to clear space to move with walker  Remove throw rugs and electrical cords from walkways  Install railings near any stairways with more than 2 steps  Use night lights for increased visibility at night  Continue to use your assistive device until cleared by surgeon or physical therapy  Dislocation Prevention - Not all procedures will have dislocation precautions  Follow dislocation precautions provided by your surgeon  It is OK to resume sexual activity about 6 weeks following surgery  Be sure to follow any dislocation precautions assigned    Durable Medical Equipment  Cold Therapy  Breg Cold Therapy Machines  Ice/Gel Packs  Assistive  Devices  Folding Walker with Wheels (in the front only)  No Rollators  Crutches if approved by Physical Therapy and Surgeon after surgery  Hip Kits  Raised Toilet Seats  Additional Compression Stockings    Joint Preservation  Healthy Activities when Cleared  Walking  Swimming  Bike Riding  Activities to Avoid  Refrain from repetitive motions which have a high impact on the joint  Gradual Progression  Progress activity slowly, listen to your body  Common Findings - NORMAL after surgery  Clicking/Grinding  Numbness near incision    Physical Therapy  Prehabilitation exercises  START TODAY ON BOTH LEGS  Surgery Specific Precautions  Follow surgery specific precautions found in your discharge paperwork    Follow-Up Visit  All patients will see their surgeon for a follow up visit after surgery  The visit may range from 2-6 weeks after surgery and is surgeon specific      Please don't hesitate to reach out if you have any additional questions or concerns.    Katja Gentile MBA, BSN, RN-BC  KAILA JoshiN, RN  [facility]  [facility]   [phone]

## 2024-05-01 ENCOUNTER — APPOINTMENT (OUTPATIENT)
Dept: PREADMISSION TESTING | Facility: HOSPITAL | Age: 44
End: 2024-05-01
Payer: MEDICARE

## 2024-05-01 ENCOUNTER — HOSPITAL ENCOUNTER (OUTPATIENT)
Dept: RADIOLOGY | Facility: HOSPITAL | Age: 44
Discharge: HOME | End: 2024-05-01
Payer: MEDICARE

## 2024-05-01 ENCOUNTER — EDUCATION (OUTPATIENT)
Dept: ORTHOPEDIC SURGERY | Facility: HOSPITAL | Age: 44
End: 2024-05-01
Payer: MEDICARE

## 2024-05-01 DIAGNOSIS — M25.352 INSTABILITY OF LEFT HIP JOINT: ICD-10-CM

## 2024-05-01 PROCEDURE — 73700 CT LOWER EXTREMITY W/O DYE: CPT | Mod: LT

## 2024-05-07 ENCOUNTER — TELEPHONE (OUTPATIENT)
Dept: ORTHOPEDIC SURGERY | Facility: HOSPITAL | Age: 44
End: 2024-05-07
Payer: MEDICARE

## 2024-05-07 NOTE — TELEPHONE ENCOUNTER
1 amp calcium given at this time        Nathalie Epsaña RN  03/05/21 4914 Thank you for taking my call today.  All questions were answered at the time of the call, but please feel free to reach out to me via MyChart or phone, [phone], with any new questions or concerns.       We confirmed that you opted to enroll in our [employer] program so your discharge prescriptions will be available to take home at the time of discharge.  Please bring any prescription insurance coverage with you on the morning of surgery so that we can enter the information into our system.     We confirmed that your plan would be to Stay Overnight.    We confirmed that you have DME needed for recovery.     Use the provided body wash for 4 days before surgery and complete a 5th shower on the morning of surgery, this includes your body and hair.  Follow the directions as provided during preadmission testing.  The mouth wash will be used the night before and the morning of surgery.       As a reminder, if you do not hear from our team, please call [phone] between 2pm and 3pm the business day before your surgery to confirm your arrival time and details.        Please don't hesitate to reach out with additional questions or concerns.    Katja Gentile MBA, BSN, RN-BC  KAILA JoshiN, RN  [facility]  [facility]  [phone]

## 2024-05-08 ENCOUNTER — LAB (OUTPATIENT)
Dept: LAB | Facility: LAB | Age: 44
End: 2024-05-08
Payer: MEDICARE

## 2024-05-08 ENCOUNTER — PRE-ADMISSION TESTING (OUTPATIENT)
Dept: PREADMISSION TESTING | Facility: HOSPITAL | Age: 44
End: 2024-05-08
Payer: MEDICARE

## 2024-05-08 VITALS
OXYGEN SATURATION: 99 % | SYSTOLIC BLOOD PRESSURE: 134 MMHG | DIASTOLIC BLOOD PRESSURE: 82 MMHG | RESPIRATION RATE: 16 BRPM | HEIGHT: 68 IN | BODY MASS INDEX: 36.9 KG/M2 | HEART RATE: 94 BPM | TEMPERATURE: 98.1 F | WEIGHT: 243.5 LBS

## 2024-05-08 DIAGNOSIS — Z01.818 PREOPERATIVE TESTING: ICD-10-CM

## 2024-05-08 DIAGNOSIS — R73.9 ELEVATED BLOOD SUGAR: ICD-10-CM

## 2024-05-08 DIAGNOSIS — D64.9 ANEMIA, UNSPECIFIED TYPE: ICD-10-CM

## 2024-05-08 DIAGNOSIS — R73.9 HYPERGLYCEMIA: ICD-10-CM

## 2024-05-08 DIAGNOSIS — Z01.818 PREOPERATIVE TESTING: Primary | ICD-10-CM

## 2024-05-08 LAB
ALBUMIN SERPL BCP-MCNC: 4.4 G/DL (ref 3.4–5)
ALP SERPL-CCNC: 105 U/L (ref 33–110)
ALT SERPL W P-5'-P-CCNC: 8 U/L (ref 7–45)
ANION GAP SERPL CALC-SCNC: 14 MMOL/L (ref 10–20)
AST SERPL W P-5'-P-CCNC: 12 U/L (ref 9–39)
BASOPHILS # BLD AUTO: 0.03 X10*3/UL (ref 0–0.1)
BASOPHILS NFR BLD AUTO: 0.4 %
BILIRUB SERPL-MCNC: 0.5 MG/DL (ref 0–1.2)
BUN SERPL-MCNC: 10 MG/DL (ref 6–23)
CALCIUM SERPL-MCNC: 9.5 MG/DL (ref 8.6–10.3)
CHLORIDE SERPL-SCNC: 103 MMOL/L (ref 98–107)
CO2 SERPL-SCNC: 27 MMOL/L (ref 21–32)
CREAT SERPL-MCNC: 0.66 MG/DL (ref 0.5–1.05)
EGFRCR SERPLBLD CKD-EPI 2021: >90 ML/MIN/1.73M*2
EOSINOPHIL # BLD AUTO: 0.25 X10*3/UL (ref 0–0.7)
EOSINOPHIL NFR BLD AUTO: 3.4 %
ERYTHROCYTE [DISTWIDTH] IN BLOOD BY AUTOMATED COUNT: 12.8 % (ref 11.5–14.5)
EST. AVERAGE GLUCOSE BLD GHB EST-MCNC: 103 MG/DL
GLUCOSE SERPL-MCNC: 89 MG/DL (ref 74–99)
HBA1C MFR BLD: 5.2 %
HCT VFR BLD AUTO: 39.7 % (ref 36–46)
HGB BLD-MCNC: 13 G/DL (ref 12–16)
IMM GRANULOCYTES # BLD AUTO: 0.01 X10*3/UL (ref 0–0.7)
IMM GRANULOCYTES NFR BLD AUTO: 0.1 % (ref 0–0.9)
LYMPHOCYTES # BLD AUTO: 2.13 X10*3/UL (ref 1.2–4.8)
LYMPHOCYTES NFR BLD AUTO: 29 %
MCH RBC QN AUTO: 28.6 PG (ref 26–34)
MCHC RBC AUTO-ENTMCNC: 32.7 G/DL (ref 32–36)
MCV RBC AUTO: 87 FL (ref 80–100)
MONOCYTES # BLD AUTO: 0.37 X10*3/UL (ref 0.1–1)
MONOCYTES NFR BLD AUTO: 5 %
NEUTROPHILS # BLD AUTO: 4.55 X10*3/UL (ref 1.2–7.7)
NEUTROPHILS NFR BLD AUTO: 62.1 %
NRBC BLD-RTO: NORMAL /100{WBCS}
PLATELET # BLD AUTO: 277 X10*3/UL (ref 150–450)
POTASSIUM SERPL-SCNC: 4.2 MMOL/L (ref 3.5–5.3)
PROT SERPL-MCNC: 7.3 G/DL (ref 6.4–8.2)
RBC # BLD AUTO: 4.54 X10*6/UL (ref 4–5.2)
SODIUM SERPL-SCNC: 140 MMOL/L (ref 136–145)
VIT B12 SERPL-MCNC: 345 PG/ML (ref 211–946)
WBC # BLD AUTO: 7.3 X10*3/UL (ref 4.4–11.3)

## 2024-05-08 PROCEDURE — 82607 VITAMIN B-12: CPT

## 2024-05-08 PROCEDURE — 87081 CULTURE SCREEN ONLY: CPT

## 2024-05-08 PROCEDURE — 85025 COMPLETE CBC W/AUTO DIFF WBC: CPT

## 2024-05-08 PROCEDURE — 80053 COMPREHEN METABOLIC PANEL: CPT

## 2024-05-08 PROCEDURE — 93010 ELECTROCARDIOGRAM REPORT: CPT | Performed by: INTERNAL MEDICINE

## 2024-05-08 PROCEDURE — 99204 OFFICE O/P NEW MOD 45 MIN: CPT | Performed by: NURSE PRACTITIONER

## 2024-05-08 PROCEDURE — 93005 ELECTROCARDIOGRAM TRACING: CPT

## 2024-05-08 PROCEDURE — 36415 COLL VENOUS BLD VENIPUNCTURE: CPT

## 2024-05-08 PROCEDURE — 83036 HEMOGLOBIN GLYCOSYLATED A1C: CPT

## 2024-05-08 RX ORDER — IBUPROFEN 200 MG
600 TABLET ORAL EVERY 6 HOURS PRN
COMMUNITY
End: 2024-05-18 | Stop reason: HOSPADM

## 2024-05-08 RX ORDER — CHLORHEXIDINE GLUCONATE ORAL RINSE 1.2 MG/ML
15 SOLUTION DENTAL AS NEEDED
Qty: 30 ML | Refills: 0 | Status: ON HOLD | OUTPATIENT
Start: 2024-05-08 | End: 2024-05-17 | Stop reason: ALTCHOICE

## 2024-05-08 RX ORDER — ACETAMINOPHEN 500 MG
1000 TABLET ORAL EVERY 6 HOURS PRN
COMMUNITY
End: 2024-05-18 | Stop reason: HOSPADM

## 2024-05-08 ASSESSMENT — ENCOUNTER SYMPTOMS
ALLERGIC/IMMUNOLOGIC NEGATIVE: 1
NEUROLOGICAL NEGATIVE: 1
ENDOCRINE NEGATIVE: 1
GASTROINTESTINAL NEGATIVE: 1
CONSTITUTIONAL NEGATIVE: 1
CARDIOVASCULAR NEGATIVE: 1
EYES NEGATIVE: 1

## 2024-05-08 ASSESSMENT — PAIN SCALES - GENERAL: PAINLEVEL_OUTOF10: 5 - MODERATE PAIN

## 2024-05-08 ASSESSMENT — PAIN - FUNCTIONAL ASSESSMENT: PAIN_FUNCTIONAL_ASSESSMENT: 0-10

## 2024-05-08 ASSESSMENT — PAIN DESCRIPTION - DESCRIPTORS: DESCRIPTORS: ACHING

## 2024-05-08 NOTE — PREPROCEDURE INSTRUCTIONS
Medication List            Accurate as of May 8, 2024 11:46 AM. Always use your most recent med list.                acetaminophen 500 mg tablet  Commonly known as: Tylenol  Medication Adjustments for Surgery: Take morning of surgery with sip of water, no other fluids     * albuterol 2.5 mg /3 mL (0.083 %) nebulizer solution  Take 3 mL (2.5 mg) by nebulization 4 times a day as needed for wheezing or shortness of breath.  Notes to patient: Use as needed     * albuterol 90 mcg/actuation inhaler  Commonly known as: ProAir HFA  Inhale 2 puffs every 4 hours if needed for wheezing.  Notes to patient: Use as needed     chlorhexidine 0.12 % solution  Commonly known as: Peridex  Use 15 mL in the mouth or throat if needed (pre operative 15ml swish and spit the night befor and morning of surgery) for up to 2 doses.  Notes to patient: As directed     fluticasone propion-salmeteroL 250-50 mcg/dose diskus inhaler  Commonly known as: Wixela Inhub  Inhale 1 puff 2 times a day. Rinse mouth with water after use to reduce aftertaste and incidence of candidiasis. Do not swallow.  Notes to patient: Use as directed     ibuprofen 200 mg tablet  Medication Adjustments for Surgery: Stop 7 days before surgery  Notes to patient: Last dose on 5/10/2024     NON FORMULARY  Medication Adjustments for Surgery: Stop 7 days before surgery  Notes to patient: If vitamin or supplement last dose on 5/10/2024     * QUEtiapine 400 mg tablet  Commonly known as: SEROquel     * QUEtiapine 50 mg tablet  Commonly known as: SEROquel  Medication Adjustments for Surgery: Take morning of surgery with sip of water, no other fluids           * This list has 4 medication(s) that are the same as other medications prescribed for you. Read the directions carefully, and ask your doctor or other care provider to review them with you.                  NPO Instructions:    Do not eat any food after midnight the night before your surgery/procedure.  You may have clear  liquids until TWO hours before surgery/procedure. This includes water, black tea/coffee, (no milk or cream) apple juice and electrolyte drinks (Gatorade).  You may chew gum up to TWO hours before your surgery/procedure.    Additional Instructions:     Seven/Six Days before Surgery:  Review your medication instructions, stop indicated medications  Five Days before Surgery:  Review your medication instructions, stop indicated medications  Begin using your Hibiclens  Three Days before Surgery:  Review your medication instructions, stop indicated medications  The Day before Surgery:  Review your medication instructions, stop indicated medications  You will be contacted regarding the time of your arrival to facility and surgery time  Do not eat any food after Midnight  Day of Surgery:  Review your medication instructions, take indicated medications  You may have clear liquids until TWO hours before surgery/procedure.  This includes water, black tea/coffee, (no milk or cream) apple juice and electrolyte drinks (Gatorade)  You may chew gum up to TWO hours before your surgery/procedure  Wear  comfortable loose fitting clothing  Do not use moisturizers, creams, lotions or perfume  All jewelry and valuables should be left at home  CONTACT SURGEON'S OFFICE IF YOU DEVELOP:  * Fever = 100.4 F   * New respiratory symptoms (e.g. cough, shortness of breath, respiratory distress, sore throat)  * Recent loss of taste or smell  *Flu like symptoms such as headache, fatigue or gastrointestinal symptoms  * You develop any open sores, shingles, burning or painful urination   AND/OR:  * You no longer wish to have the surgery.  * Any other personal circumstances change that may lead to the need to cancel or defer this surgery.  *You were admitted to any hospital within one week of your planned procedure.    SMOKING:  *Quitting smoking can make a huge difference to your health and recovery from surgery.    *If you need help with quitting,  call 5-639-QUIT-NOW.    THE DAY BEFORE SURGERY:  *Do not eat any food after midnight the night before your surgery.   *You may have up to 13.5  OUNCES of clear liquids until TWO hours before your instructed ARRIVAL TIME to hospital. This includes water, black tea/coffee, (no milk or cream) apple juice, clear broth and electrolyte drinks (Gatorade). Please avoid clear liquids that are red in color.   *You may chew gum/mints up to TWO hours before your surgery/procedure.    SURGICAL TIME:  *You will be contacted between 2 p.m. and 3 p.m. the business day before your surgery with your arrival time.  *If you haven't received a call by 3pm, call [phone]  *Scheduled surgery times may change and you will be notified if this occurs-check your personal voicemail for any updates.    ON THE MORNING OF SURGERY:  *Wear comfortable, loose fitting clothing.   *Do not use moisturizers, creams, lotions or perfume.  *All jewelry and valuables should be left at home.  *Prosthetic devices such as contact lenses, hearing aids, dentures, eyelash extensions, hairpins and body piercing must be removed before surgery.    BRING WITH YOU:  *Photo ID and insurance card  *Current list of medications and allergies  *Pacemaker/Defibrillator/Heart stent cards  *CPAP machine and mask  *Slings/splints/crutches  *Copy of your complete Advanced Directive/DHPOA-if applicable  *Neurostimulator implant remote    PARKING AND ARRIVAL:  *Check in at the Main Entrance desk and let them know you are here for surgery.    IF YOU ARE HAVING OUTPATIENT/SAME DAY SURGERY:  *A responsible adult MUST accompany you at the time of discharge and stay with you for 24 hours after your surgery.  *You may NOT drive yourself home after surgery.  *You may use a taxi or ride sharing service ([employer], [employer]) to return home ONLY if you are accompanied by a friend or family member.  *Instructions for resuming your medications will be provided by your surgeon.    Thank you for  coming to Pre Admission testing.     If I have prescribe medication please don't forget to  at your pharmacy.     Any questions about today's visit call [phone] and leave a message in the general mailbox.    Patient instructed to ambulate as soon as possible postoperatively to decrease thromboembolic risk.    Effie Plaza, APRN-CNP    Thank you for visiting the [facility].  If you have any changes to your health condition, please call the surgeons office to alert them and give them details of your symptoms.        Preoperative Fasting Guidelines    Why must I stop eating and drinking near surgery time?  With sedation, food or liquid in your stomach can enter your lungs causing serious complications  Increases nausea and vomiting    When do I need to stop eating and drinking before my surgery?  Do not eat any food after midnight the night before your surgery/procedure.  You may have up to 13.5 ounces of clear liquid until TWO hours before your instructed arrival time to the hospital.  This includes water, black tea/coffee, (no milk or cream) apple juice, and electrolyte drinks (Gatorade)  You may chew gum until TWO hours before your surgery/procedure      Additional Instructions:     The Day before Surgery:  -Review your medication instructions, stop indicated medications  -You will be contacted in the evening regarding the time of your arrival to facility and surgery time    Day of Surgery:  -Review your medication instructions, take indicated medications  -Wear comfortable loose fitting clothing  -Do not use moisturizers, creams, lotions or perfume  -All jewelry and valuables should be left at home              Preoperative Brain Exercises    What are brain exercises?  A brain exercise is any activity that engages your thinking (cognitive) skills.    What types of activities are considered brain exercises?  Jigsaw puzzles, crossword puzzles, word jumble, memory games, word search,  and many more.  Many can be found free online or on your phone via a mobile zoila.    Why should I do brain exercises before my surgery?  More recent research has shown brain exercise before surgery can lower the risk of postoperative delirium (confusion) which can be especially important for older adults.  Patients who did brain exercises for 5 to 10 hours the days before surgery, cut their risk of postoperative delirium in half up to 1 week after surgery.                  [facility]    Preoperative Deep Breathing Exercises    Why it is important to do deep breathing exercises before my surgery?  Deep breathing exercises strengthen your breathing muscles.  This helps you to recover after your surgery and decreases the chance of breathing complications.      How are the deep breathing exercises done?  Sit straight with your back supported.  Breathe in deeply and slowly through your nose. Your lower rib cage should expand and your abdomen may move forward.  Hold that breath for 3 to 5 seconds.  Breathe out through pursed lips, slowly and completely.  Rest and repeat 10 times every hour while awake.  Rest longer if you become dizzy or lightheaded.                [facility]    Preoperative Deep Breathing Exercises    Why it is important to do deep breathing exercises before my surgery?  Deep breathing exercises strengthen your breathing muscles.  This helps you to recover after your surgery and decreases the chance of breathing complications.      How are the deep breathing exercises done?  Sit straight with your back supported.  Breathe in deeply and slowly through your nose. Your lower rib cage should expand and your abdomen may move forward.  Hold that breath for 3 to 5 seconds.  Breathe out through pursed lips, slowly and completely.  Rest and repeat 10 times every hour while awake.  Rest longer if you become dizzy or lightheaded.      Patient Information:  Incentive Spirometer  What is an incentive spirometer?  An incentive spirometer is a device used before and after surgery to “exercise” your lungs.  It helps you to take deeper breaths to expand your lungs.  Below is an example of a basic incentive spirometer.  The device you receive may differ slightly but they all function the same.    Why do I need to use an incentive spirometer?  Using your incentive spirometer prepares your lungs for surgery and helps prevent lung problems after surgery.  How do I use my incentive spirometer?  When you're using your incentive spirometer, make sure to breathe through your mouth. If you breathe through your nose, the incentive spirometer won't work properly. You can hold your nose if you have trouble.  If you feel dizzy at any time, stop and rest. Try again at a later time.  Follow the steps below:  Set up your incentive spirometer, expand the flexible tubing and connect to the outlet.  Sit upright in a chair or bed. Hold the incentive spirometer at eye level.   Put the mouthpiece in your mouth and close your lips tightly around it. Slowly breathe out (exhale) completely.  Breathe in (inhale) slowly through your mouth as deeply as you can. As you take a breath, you will see the piston rise inside the large column. While the piston rises, the indicator should move upwards. It should stay in between the 2 arrows (see Figure).  Try to get the piston as high as you can, while keeping the indicator between the arrows.   If the indicator doesn't stay between the arrows, you're breathing either too fast or too slow.  When you get it as high as you can, hold your breath for 10 seconds, or as long as possible. While you're holding your breath, the piston will slowly fall to the base of the spirometer.  Once the piston reaches the bottom of the spirometer, breathe out slowly through your mouth. Rest for a few seconds.  Repeat 10 times. Try to get the piston to the same level with each  breath.  Repeat every hour while awake  You can carefully clean the outside of the mouthpiece with an alcohol wipe or soap and water.      Patient and Family Education             Ways You Can Help Prevent Blood Clots             This handout explains some simple things you can do to help prevent blood clots.      Blood clots are blockages that can form in the body's veins. When a blood clot forms in your deep veins, it may be called a deep vein thrombosis, or DVT for short. Blood clots can happen in any part of the body where blood flows, but they are most common in the arms and legs. If a piece of a blood clot breaks free and travels to the lungs, it is called a pulmonary embolus (PE). A PE can be a very serious problem.         Being in the hospital or having surgery can raise your chances of getting a blood clot because you may not be well enough to move around as much as you normally do.         Ways you can help prevent blood clots in the hospital         Wearing SCDs. SCDs stands for Sequential Compression Devices.   SCDs are special sleeves that wrap around your legs  They attach to a pump that fills them with air to gently squeeze your legs every few minutes.   This helps return the blood in your legs to your heart.   SCDs should only be taken off when walking or bathing.   SCDs may not be comfortable, but they can help save your life.               Wearing compression stockings - if your doctor orders them. These special snug fitting stockings gently squeeze your legs to help blood flow.       Walking. Walking helps move the blood in your legs.   If your doctor says it is ok, try walking the halls at least   5 times a day. Ask us to help you get up, so you don't fall.      Taking any blood thinning medicines your doctor orders.        Page 1 of 2     [facility]; 3/23   Ways you can help prevent blood clots at home       Wearing compression stockings - if your doctor orders them. ? Walking - to  help move the blood in your legs.       Taking any blood thinning medicines your doctor orders.      Signs of a blood clot or PE      Tell your doctor or nurse know right away if you have of the problems listed below.    If you are at home, seek medical care right away. Call 911 for chest pain or problems breathing.               Signs of a blood clot (DVT) - such as pain,  swelling, redness or warmth in your arm or leg      Signs of a pulmonary embolism (PE) - such as chest     pain or feeling short of breath

## 2024-05-08 NOTE — CPM/PAT H&P
CPM/PAT Evaluation   Chloe Mclean is a 44 y.o. female   Chief Complaint: Hip pain having my hip replaced    HPI:  Patient is a 43 y/o alert and oriented female coming in for PAT for a scheduled total Hip Arthroplasty on 5/17/2024 w/ Dr. Rendon.    The patient reports she has 5/10 dull achy hip pain.  She states standing from a sitting position causes her hip to pop our of place causing sharp pain.  She does report she has a hard time walking.  Activity makes the pain worse.  Laying down helps.  She has not had any injections or physical therapy.    Patient denies chest pain, SOB, GREEN and NVDC.    Patient also denies Hx:PE.    Reports having a DVT after her right knee replacement over 20 years ago.  Current medications were reviewed and a presurgical mediation schedule was provided.    She has no questions at this time.     Past Medical History:   Diagnosis Date    Acute bronchitis 05/03/2023    Acute dermatitis 05/03/2023      Past Surgical History:   Procedure Laterality Date    OTHER SURGICAL HISTORY  11/05/2019    Appendectomy    OTHER SURGICAL HISTORY  11/05/2019    Hysterectomy    OTHER SURGICAL HISTORY  11/05/2019    Gallbladder surgery    OTHER SURGICAL HISTORY  07/23/2021    Lung surgery    OTHER SURGICAL HISTORY  07/23/2021    Hip surgery        Allergies   Allergen Reactions    Iodine Unknown    Latex Rash        Current Outpatient Medications on File Prior to Visit   Medication Sig Dispense Refill    albuterol (ProAir HFA) 90 mcg/actuation inhaler Inhale 2 puffs every 4 hours if needed for wheezing. 8.5 g 11    albuterol 2.5 mg /3 mL (0.083 %) nebulizer solution Take 3 mL (2.5 mg) by nebulization 4 times a day as needed for wheezing or shortness of breath. 540 mL 3    fluticasone propion-salmeteroL (Wixela Inhub) 250-50 mcg/dose diskus inhaler Inhale 1 puff 2 times a day. Rinse mouth with water after use to reduce aftertaste and incidence of candidiasis. Do not swallow. 1 each 11    ketoconazole  (NIZOral) 2 % shampoo 1 Application once daily.      QUEtiapine (SEROquel) 400 mg tablet Take 1 tablet (400 mg) by mouth once daily at bedtime.      QUEtiapine (SEROquel) 50 mg tablet Take 1 tablet (50 mg) by mouth 3 times a day as needed.       No current facility-administered medications on file prior to visit.       Vitals:    05/08/24 1127   BP: 134/82   Pulse: 94   Resp: 16   Temp: 36.7 °C (98.1 °F)   SpO2: 99%       Review of Systems   Constitutional: Negative.    HENT: Negative.     Eyes: Negative.    Respiratory:          COPD, Asthma  DASHA resolved w/ weight loss 100 lbs  Hx: pneumothorax   Cardiovascular: Negative.         Hx: right leg dvt ater right knee replacement > 20 years ago   Gastrointestinal: Negative.    Endocrine: Negative.    Genitourinary: Negative.    Musculoskeletal:         Osteoarthritis   See hpi  Psoriatic arthritis   Skin: Negative.    Allergic/Immunologic: Negative.         Lupus  Psoriatic arthritis   Neurological: Negative.    Hematological:         Anemia   Psychiatric/Behavioral:          Schizophrenia        Physical Exam  Vitals and nursing note reviewed.   Constitutional:       Appearance: Normal appearance.   HENT:      Head: Normocephalic and atraumatic.      Mouth/Throat:      Mouth: Mucous membranes are moist.      Pharynx: Oropharynx is clear.   Eyes:      Pupils: Pupils are equal, round, and reactive to light.   Cardiovascular:      Rate and Rhythm: Normal rate and regular rhythm.      Heart sounds: Normal heart sounds.   Pulmonary:      Effort: Pulmonary effort is normal.      Breath sounds: Normal breath sounds.   Abdominal:      General: Bowel sounds are normal.      Palpations: Abdomen is soft.   Musculoskeletal:      Cervical back: Normal range of motion.      Comments: Difficulty walking   Skin:     General: Skin is warm and dry.   Neurological:      General: No focal deficit present.      Mental Status: She is alert and oriented to person, place, and time.    Psychiatric:         Mood and Affect: Mood normal.         Behavior: Behavior normal.         Thought Content: Thought content normal.         Judgment: Judgment normal.        PAT AIRWAY:   Airway:     Mallampati::  II    TM distance::  >3 FB    Neck ROM::  Full    Assessment and Plan:     Neuro:  No neurologic diagnoses.  The patient is not at an increased risk for post operative delirium  Preoperative brain exercise educational handout provided to patient.    The patient is not at an increased risk for perioperative stroke.    Psych  Schizophrenia  Managed with seroquel 400mg at bedtime and 50mg tid prn    HEENT/Airway:    See above    Cardiovascular:  No additional preoperative testing is currently indicated.    ASA II  EKG NSR rate of 88  METS are  RCRI  0 which is 3.9% % 30 day risk of MACE (risk for cardiac death, nonfatal myocardial infarction, and nonfactal cardiac arrest  KARLI score which indicates a   % risk of intraoperative or 30-day postoperative MACE      Pulmonary:    COPD / Asthma  Managed with albuterol inhaler  Managed with Wixela 1 puff bid  PFT's   PFTs 2023: FEV1 79%, BDR in mid expiratory flow rates, % and %.   -CXR with hyperinflation and air trapping  -Recommend take Symbicort daily. Ventolin prn.   DASHA  Follow up with pulmonology as scheduled last appt 2024    Preoperative deep breathing educational handout provided to patient.    ARISCAT:  0  points which is a low (1.6%) risk of in-hospital post-op pulmonary complications   PRODIGY:  0 points which is a low risk of post op opioid induced respiratory depression episodes  STOP BAN   points which is a low risk for moderate to severe DASHA    Renal:  No diagnosis or significant findings on chart review or clinical presentation and evaluation.    The patient is at increased risk of perioperative renal complications     Pt at Low risk for perioperative OCTAVIO based on Dynamic Predictive Scoring Tool for Perioperative  OCTAVIO    Endocrine:  No diagnosis or significant findings on chart review or clinical presentation and evaluation.     Hematologic:  No diagnosis or significant findings on chart review or clinical presentation and evaluation.  Preoperative DVT educational handout provided to patient.    Caprini Score:  6  points which is a high risk of perioperative VTE    Gastrointestinal:   No diagnosis or significant findings on chart review or clinical presentation and evaluation.    EAT-10 score of   0 - self-perceived oropharyngeal dysphagia scale (0-40)   Apfel: 1 points 21%% risk for post operative N/V    Infectious disease:  negative or type diagnoses  Chlorhexidine 0.12% mouthwash sent to the pharmacy  Staph screen collected completed by lab     Musculoskeletal:    Primary Osteoarthritis Left Hip  Total Hip Arthroplasty     Other:     NSQUIP    overall surgical risk  Modified Frailty  JHFRAT-3 low  falls risk    Anesthesia:  Patient reports she has post op nausea  Mom is slow to wake with anesthesia     dental issues has no upper teeth has bottom teeth    ex-smoker quit 1.5 years ago was 2 PPD x 32 yrs  Vapes nicotine daily  Marijuana on occastion  has personal/family issues with Anesthesia  No nickel, shell fish  Has an iodine allergy    Discussed with patient medication instructions, NPO guidelines, and any questions or concerns. Patient does not need further workup prior to preceding with elective surgery based on based on risk assessment.     Chlorhexidine .12% Dental Rinse e-prescribed per  infection prevention protocol. Patient educated.   Patient advised to call [facility] if mouthwash not received.    Face to Face patient contact time 30 minutes    JAMSHID Rivera-CNP 5/8/2024 8:27 AM      Labs ordered  cbc, basic, A1c, and MSSA/MRSA culture  EKG ordered

## 2024-05-08 NOTE — H&P (VIEW-ONLY)
CPM/PAT Evaluation   Chloe Mclean is a 44 y.o. female   Chief Complaint: Hip pain having my hip replaced    HPI:  Patient is a 45 y/o alert and oriented female coming in for PAT for a scheduled total Hip Arthroplasty on 5/17/2024 w/ Dr. Rendon.    The patient reports she has 5/10 dull achy hip pain.  She states standing from a sitting position causes her hip to pop our of place causing sharp pain.  She does report she has a hard time walking.  Activity makes the pain worse.  Laying down helps.  She has not had any injections or physical therapy.    Patient denies chest pain, SOB, GREEN and NVDC.    Patient also denies Hx:PE.    Reports having a DVT after her right knee replacement over 20 years ago.  Current medications were reviewed and a presurgical mediation schedule was provided.    She has no questions at this time.     Past Medical History:   Diagnosis Date    Acute bronchitis 05/03/2023    Acute dermatitis 05/03/2023      Past Surgical History:   Procedure Laterality Date    OTHER SURGICAL HISTORY  11/05/2019    Appendectomy    OTHER SURGICAL HISTORY  11/05/2019    Hysterectomy    OTHER SURGICAL HISTORY  11/05/2019    Gallbladder surgery    OTHER SURGICAL HISTORY  07/23/2021    Lung surgery    OTHER SURGICAL HISTORY  07/23/2021    Hip surgery        Allergies   Allergen Reactions    Iodine Unknown    Latex Rash        Current Outpatient Medications on File Prior to Visit   Medication Sig Dispense Refill    albuterol (ProAir HFA) 90 mcg/actuation inhaler Inhale 2 puffs every 4 hours if needed for wheezing. 8.5 g 11    albuterol 2.5 mg /3 mL (0.083 %) nebulizer solution Take 3 mL (2.5 mg) by nebulization 4 times a day as needed for wheezing or shortness of breath. 540 mL 3    fluticasone propion-salmeteroL (Wixela Inhub) 250-50 mcg/dose diskus inhaler Inhale 1 puff 2 times a day. Rinse mouth with water after use to reduce aftertaste and incidence of candidiasis. Do not swallow. 1 each 11    ketoconazole  (NIZOral) 2 % shampoo 1 Application once daily.      QUEtiapine (SEROquel) 400 mg tablet Take 1 tablet (400 mg) by mouth once daily at bedtime.      QUEtiapine (SEROquel) 50 mg tablet Take 1 tablet (50 mg) by mouth 3 times a day as needed.       No current facility-administered medications on file prior to visit.       Vitals:    05/08/24 1127   BP: 134/82   Pulse: 94   Resp: 16   Temp: 36.7 °C (98.1 °F)   SpO2: 99%       Review of Systems   Constitutional: Negative.    HENT: Negative.     Eyes: Negative.    Respiratory:          COPD, Asthma  DASHA resolved w/ weight loss 100 lbs  Hx: pneumothorax   Cardiovascular: Negative.         Hx: right leg dvt ater right knee replacement > 20 years ago   Gastrointestinal: Negative.    Endocrine: Negative.    Genitourinary: Negative.    Musculoskeletal:         Osteoarthritis   See hpi  Psoriatic arthritis   Skin: Negative.    Allergic/Immunologic: Negative.         Lupus  Psoriatic arthritis   Neurological: Negative.    Hematological:         Anemia   Psychiatric/Behavioral:          Schizophrenia        Physical Exam  Vitals and nursing note reviewed.   Constitutional:       Appearance: Normal appearance.   HENT:      Head: Normocephalic and atraumatic.      Mouth/Throat:      Mouth: Mucous membranes are moist.      Pharynx: Oropharynx is clear.   Eyes:      Pupils: Pupils are equal, round, and reactive to light.   Cardiovascular:      Rate and Rhythm: Normal rate and regular rhythm.      Heart sounds: Normal heart sounds.   Pulmonary:      Effort: Pulmonary effort is normal.      Breath sounds: Normal breath sounds.   Abdominal:      General: Bowel sounds are normal.      Palpations: Abdomen is soft.   Musculoskeletal:      Cervical back: Normal range of motion.      Comments: Difficulty walking   Skin:     General: Skin is warm and dry.   Neurological:      General: No focal deficit present.      Mental Status: She is alert and oriented to person, place, and time.    Psychiatric:         Mood and Affect: Mood normal.         Behavior: Behavior normal.         Thought Content: Thought content normal.         Judgment: Judgment normal.        PAT AIRWAY:   Airway:     Mallampati::  II    TM distance::  >3 FB    Neck ROM::  Full    Assessment and Plan:     Neuro:  No neurologic diagnoses.  The patient is not at an increased risk for post operative delirium  Preoperative brain exercise educational handout provided to patient.    The patient is not at an increased risk for perioperative stroke.    Psych  Schizophrenia  Managed with seroquel 400mg at bedtime and 50mg tid prn    HEENT/Airway:    See above    Cardiovascular:  No additional preoperative testing is currently indicated.    ASA II  EKG NSR rate of 88  METS are  RCRI  0 which is 3.9% % 30 day risk of MACE (risk for cardiac death, nonfatal myocardial infarction, and nonfactal cardiac arrest  KARLI score which indicates a   % risk of intraoperative or 30-day postoperative MACE      Pulmonary:    COPD / Asthma  Managed with albuterol inhaler  Managed with Wixela 1 puff bid  PFT's   PFTs 2023: FEV1 79%, BDR in mid expiratory flow rates, % and %.   -CXR with hyperinflation and air trapping  -Recommend take Symbicort daily. Ventolin prn.   DASHA  Follow up with pulmonology as scheduled last appt 2024    Preoperative deep breathing educational handout provided to patient.    ARISCAT:  0  points which is a low (1.6%) risk of in-hospital post-op pulmonary complications   PRODIGY:  0 points which is a low risk of post op opioid induced respiratory depression episodes  STOP BAN   points which is a low risk for moderate to severe DASHA    Renal:  No diagnosis or significant findings on chart review or clinical presentation and evaluation.    The patient is at increased risk of perioperative renal complications     Pt at Low risk for perioperative OCTAVIO based on Dynamic Predictive Scoring Tool for Perioperative  OCTAVIO    Endocrine:  No diagnosis or significant findings on chart review or clinical presentation and evaluation.     Hematologic:  No diagnosis or significant findings on chart review or clinical presentation and evaluation.  Preoperative DVT educational handout provided to patient.    Caprini Score:  6  points which is a high risk of perioperative VTE    Gastrointestinal:   No diagnosis or significant findings on chart review or clinical presentation and evaluation.    EAT-10 score of   0 - self-perceived oropharyngeal dysphagia scale (0-40)   Apfel: 1 points 21%% risk for post operative N/V    Infectious disease:  negative or type diagnoses  Chlorhexidine 0.12% mouthwash sent to the pharmacy  Staph screen collected completed by lab     Musculoskeletal:    Primary Osteoarthritis Left Hip  Total Hip Arthroplasty     Other:     NSQUIP    overall surgical risk  Modified Frailty  JHFRAT-3 low  falls risk    Anesthesia:  Patient reports she has post op nausea  Mom is slow to wake with anesthesia     dental issues has no upper teeth has bottom teeth    ex-smoker quit 1.5 years ago was 2 PPD x 32 yrs  Vapes nicotine daily  Marijuana on occastion  has personal/family issues with Anesthesia  No nickel, shell fish  Has an iodine allergy    Discussed with patient medication instructions, NPO guidelines, and any questions or concerns. Patient does not need further workup prior to preceding with elective surgery based on based on risk assessment.     Chlorhexidine .12% Dental Rinse e-prescribed per  infection prevention protocol. Patient educated.   Patient advised to call [facility] if mouthwash not received.    Face to Face patient contact time 30 minutes    JAMSHID Rivera-CNP 5/8/2024 8:27 AM      Labs ordered  cbc, basic, A1c, and MSSA/MRSA culture  EKG ordered

## 2024-05-09 LAB
ATRIAL RATE: 88 BPM
P AXIS: 42 DEGREES
P OFFSET: 194 MS
P ONSET: 139 MS
PR INTERVAL: 168 MS
Q ONSET: 223 MS
QRS COUNT: 14 BEATS
QRS DURATION: 100 MS
QT INTERVAL: 362 MS
QTC CALCULATION(BAZETT): 438 MS
QTC FREDERICIA: 411 MS
R AXIS: 74 DEGREES
T AXIS: 40 DEGREES
T OFFSET: 404 MS
VENTRICULAR RATE: 88 BPM

## 2024-05-10 LAB — STAPHYLOCOCCUS SPEC CULT: NORMAL

## 2024-05-14 ENCOUNTER — TELEPHONE (OUTPATIENT)
Dept: PRIMARY CARE | Facility: CLINIC | Age: 44
End: 2024-05-14
Payer: MEDICARE

## 2024-05-14 NOTE — TELEPHONE ENCOUNTER
----- Message from MARTY You sent at 5/13/2024 11:01 PM EDT -----  Please tell patient that her lab results are normal.

## 2024-05-16 RX ORDER — SENNOSIDES 8.6 MG/1
1 TABLET ORAL 2 TIMES DAILY
Qty: 60 TABLET | Refills: 0 | Status: SHIPPED | OUTPATIENT
Start: 2024-05-16 | End: 2024-06-15

## 2024-05-16 RX ORDER — PANTOPRAZOLE SODIUM 40 MG/1
40 TABLET, DELAYED RELEASE ORAL DAILY PRN
Qty: 30 TABLET | Refills: 0 | Status: SHIPPED | OUTPATIENT
Start: 2024-05-16 | End: 2024-06-16

## 2024-05-16 RX ORDER — OXYCODONE HYDROCHLORIDE 5 MG/1
5 TABLET ORAL EVERY 6 HOURS PRN
Qty: 28 TABLET | Refills: 0 | Status: SHIPPED | OUTPATIENT
Start: 2024-05-16 | End: 2024-05-24 | Stop reason: SDUPTHER

## 2024-05-16 RX ORDER — TRAMADOL HYDROCHLORIDE 50 MG/1
50 TABLET ORAL EVERY 6 HOURS PRN
Qty: 28 TABLET | Refills: 0 | Status: SHIPPED | OUTPATIENT
Start: 2024-05-16 | End: 2024-05-24

## 2024-05-16 RX ORDER — CYCLOBENZAPRINE HCL 5 MG
5 TABLET ORAL 3 TIMES DAILY PRN
Qty: 30 TABLET | Refills: 0 | Status: SHIPPED | OUTPATIENT
Start: 2024-05-16 | End: 2024-05-27

## 2024-05-16 RX ORDER — DOXYCYCLINE 100 MG/1
100 CAPSULE ORAL 2 TIMES DAILY
Qty: 14 CAPSULE | Refills: 0 | Status: SHIPPED | OUTPATIENT
Start: 2024-05-16 | End: 2024-05-24

## 2024-05-16 RX ORDER — ACETAMINOPHEN 325 MG/1
1000 TABLET ORAL EVERY 8 HOURS PRN
Qty: 90 TABLET | Refills: 1 | Status: SHIPPED | OUTPATIENT
Start: 2024-05-16

## 2024-05-16 NOTE — DISCHARGE INSTRUCTIONS
Ramya Rendon DO  Phone: [phone] Catrachito Lombardi, Medical Assistant    PLEASE READ CAREFULLY BEFORE CONTACTING YOUR PROVIDER.    WE WORK COLLABORATIVELY AS A TEAM. CALLING MULTIPLE STAFF MEMBERS REGARDING THE SAME ISSUE WILL DELAY YOUR CARE.  MYCHART IS THE PREFERRED COMMUNICATION FOR ALL TEAM MEMBERS.  ________________________________________________________________________________________________________________________________________________________________________    After Surgery Instructions: TOTAL HIP ARTHROPLASTY    The following is a general guide and may be applied to most patients that go home from the hospital after surgery. If you go to a rehab facility the timeline may deviate a little. Please do not hesitate to call our office for any questions or additional guidance. We will work with you to get the best experience from your new joint replacement.     WEEK 1  Relax…Don't Overdo it!  - Get up once an hour for light activity while you are awake. (get a drink, go to the bathroom, etc.)  - Keep the surgical site iced and elevated above your heart, if possible, while you are resting.  - You will have some light exercises given to you from the physical therapist in the hospital. They will teach you posterior hip precautions that you should be mindful of for the first six weeks after surgery.    SWELLING AND BRUISING    BRUISING AND SWELLING AFTER SURGERY IS NORMAL. As the patient becomes more mobile the bruising and swelling will begin to migrate towards the ankle and foot and is usually noticed toward the end of the day.    WEEK 2-3  You will have a prescription for physical therapy given to you or electronically prescribed and you should start outpatient therapy one week after your operation. Be sure to do the home exercises on the days you are not attending physical therapy.    - Continue to progress walking as tolerated.   - Continue to use ice for soreness on the surgical site  - You may wean from  your walker to a cane when you feel safe and comfortable to do so. Your physical therapist will also be helpful with progressing your assistive device.   - Be careful with bending and twisting - If it hurts, stop!!    FOLLOW UP  - Your first post-operative visit with Dr. Ramya Rendon should have been scheduled already. Please call [phone] for appointment questions  - You do not need new xrays for this visit  - Don't be nervous! This visit is to see how you are doing and make sure your incision is healing nicely and have begun working with physical therapy.       MEDICATION REFILLS - Please call main office number: [phone]    You will not receive a call indicating that your prescription has been filled.  Please contact your pharmacy with any questions.    Medication refills will be filled Monday-Friday 7am to 1pm ONLY. Please call the office or send a Amara message for a refill request.  Any requests received outside of this timeframe will be handled on the next business day.  The office staff and orthopedic nurses cannot refill medications; messages should be left directly through the office or via my chart.  Please do not call multiple times or call other members of the care team for medication needs, this will cause the refill to take longer.    Per State and Institutional policy, pain medications can only be refilled every 7 days for up to six weeks following surgery.    DRIVING & TRAVEL AFTER SURGERY   Patients should anticipate waiting at least 3-6 weeks before traveling long distances after surgery.  At minimum you cannot be using your walker before considering driving and you cannot take any narcotic medications prior to driving. You will need to stop to walk around ever 1 hour during your travel to help with blood clot prevention.  Please call the office or your joint nurse to discuss prior to post-surgical travel.  Patients may not drive until cleared by the joint nurse or the  office.    DENTAL PROCEDURES & CLEANINGS  You must wait a minimum of 3 months for elective dental appointments (if possible, we understand emergencies happen), including routine cleanings or dental work including bridges, crowns, extractions, etc..  For any dental visit - cleaning or dental procedures - patients must take an antibiotic 1 hour before the appointment.  Antibiotics are a lifelong need before dental appointments.  The antibiotic prescribed will be based on each patient's allergies.    WOUND CARE  You will have an ace wrap on your leg put on during surgery. This compression wrap is for comfort and may be removed 24 hours after surgery. You may continue to use compression throughout your recovery if this is comfortable for you.  You have a waterproof bandage on your wound and may shower with this on. The waterproof bandage is to remain in place for 7 days. You may remove it yourself. You may leave your incision open to air after the bandage has been removed.  Under your waterproof bandage you have a mesh and glue dressin in place. Do not peel this off. This will be on for 3-4 weeks. You may trim the edges as they peel off on their own. You can continue to shower with this on. Let the water run freely over your incision when showering and do not scrub.   You may shower upon discharging from the hospital.  Soap and water is permitted to run over the surgical dressing.  Do not scrub directly over these items.  DO NOT soak your incision in a bath, hot tub, pool or pond/lake for a minimum of 3 weeks following your surgery.  DO NOT use lotions, creams, ointments on your wound for a minimum of 3 weeks following your surgery. At that time you may use vitamin E to assist with softening of your incision.    RESTARTING HOME ROUTINE - DIET & MEDICATIONS  Post-operative constipation can result due to a combination of inactivity, anesthesia and pain medication. To help prevent this, you should increase your water and  fiber intake. Physical activity such as walking will also help stimulate the bowels.   You may resume your normal diet when you discharge home.  Choose foods that help promote good bowel habits and prevent constipation, such as foods high in fiber.  You may restart your home medications the following day after your surgery UNLESS you have been given alternate instructions.  Follow the instructions given to you on your hospital discharge instructions for more information regarding your home medications.    IN-HOME PHYSICAL THERAPY & OUTPATIENT PHYSICAL THERAPY  Continue the exercises you were given in the hospital until you have been seen by in-home therapy.  Make sure to provide a phone number with the ability for the home care staff to leave a message if you do not answer your phone.    Depending on your treatment plan you will begin outpatient or home therapy after surgery. This should be arranged through the office of hospital during discharge  FOR PATIENT DOING HOME THERAPY: Outpatient physical therapy following knee replacement surgery should begin 2-3 weeks after surgery.  You should call to schedule this appointment ASAP if not already scheduled before surgery.  Waiting until you are ready for outpatient physical therapy will cause a delay in your care.  You may choose any outpatient physical therapy location.  Call the office for an order if needed.    EMERGENCIES - WHEN TO CONTACT THE SURGEON'S OFFICE IMMEDIATELY  Fever >101 with chills that has been present for at least 48 hours.   Excessive bleeding from incision that will not slow down. A small amount of drainage is normal and expected.  Once pressure is applied and the area is covered, do not continue to check the area regularly.  This will remove pressure and bleeding will continue.  Leave in place for 4-6 hours.  Signs of infection of incision-excessive drainage that is soaking through your dressing (especially if it is pus-like), redness that is  spreading out from the edges of your incision, or increased warmth around the area.  Excruciating pain for which the pain medication, taken as instructed, is not helping.  Severe calf pain.  Go directly to the emergency room or call 911, if you are experiencing chest pain or difficulty breathing.      ICE & COLD THERAPY INSTRUCTIONS    To assist with pain control and post-op swelling, you should be using ice regularly throughout recovery, especially for the first 6 weeks, regardless of the cold therapy method you use.      Always make sure there is a layer of protection between the cold pad and your skin.    If you are using ICE PACKS or GEL PACKS, you will need to alternate 20 minutes on, 20 minutes off twice per hour.    If you are using an ICE MACHINE, please follow the provided ice machine instructions.  These devices differ from ice or ice packs whereas the mechanism circulates water through tubing and a pad to provide longer periods of cold therapy to the desired site.  You can use your cold devices around the clock for optimal comfort.  We recommend using cold therapy after working with therapy or completing exercises on your own.  There is no set schedule in which you must follow while using cold therapy.  Below are a few points to remember when using a cold therapy device:    You do not need to need to use the 20 on, 20 off method.  Detach the pad from the cooler and ambulate at least once every hour.  You can check your skin under the pad at this time.  You may wear the cold therapy device during periods of sleep including overnight.  If you wake up during the night, you can check the skin at this time.  You do not need to wake up specifically to perform skin checks.  Empty the cooler and pad when device is not in use.  Follow 's instructions for cleaning your cold therapy device.      DISCHARGE MEDICATIONS - Please reference the sample schedule on the reverse side for instructions on how to  best schedule medications.    PAIN MEDICATION    _______ Oxycodone   Oxycodone has been prescribed for post-operative pain control. Take one 5 mg tablet every 6 hours for pain. Alternate with Tramadol. See medication example sheet. This medication will only be refilled ONCE every 7 days for a period of 6 weeks. After 6 weeks, you will transition to acetaminophen (Tylenol) and over -the- counter anti-inflammatories such as Ibuprofen, Advil or Aleve in conjunction with ICE.    Side effects may be constipation and nausea, vomiting, sleepiness, dizziness, lightheadedness, headache, blurred vision, dry mouth sweating, itching (if you have itching, over-the -counter Benadryl can be used as needed).   You may NOT operate a motor vehicle while taking this medication or have been cleared by your surgeon or PA.     ________ Tramadol   Tramadol has been prescribed for post-operative pain control. Take one 50 mg tablet every 6 hours for pain. Alternate with Oxycodone. See medication example sheet. This medication will only be refilled ONCE every 7 days for a period of 6 weeks. After 6 weeks, you will transition to acetaminophen (Tylenol) and over- the- counter anti-inflammatories such as Ibuprofen, Advil or Aleve in conjunction with ICE.    Side effects may be constipation and nausea, vomiting, sleepiness, dizziness, lightheadedness, headache, blurred vision, dry mouth, sweating, itching (if you have itching, over-the -counter Benadryl can be used as needed).   You may NOT operate a motor vehicle while taking this medication or have been cleared by your surgeon or PA.    NON-NARCOTIC PAIN MEDICATION     _________ Celecoxib (Celebrex) or Meloxicam (Mobic) - Prescription anti-inflammatory medication   One of these will be prescribed (if you are able to take it) as an adjunct anti-inflammatory to assist in pain control. Take one twice daily for 4 weeks. See medication example sheet. You will not receive refills on this  medication.   Side effects may include nausea or upset stomach    _________ Acetaminophen (Tylenol)   Acetaminophen has been prescribed as an adjunct for pain control. Take two 500 mg tablet every 6-8 hours for 4 weeks. See medication example sheet. No refills will be given after initial prescription.   Side effects may include nausea, heartburn, drowsiness, and headache.    _________Cyclobenzaprine (Flexeril)   This is a muscle relaxer that will be prescribed    Take this AS NEEDED per instructions on bottle   This will be only prescribed once and is available to help with muscle spasms. There will be no refills of this medication    BLOOD THINNER    __________ Aspirin or Other Blood Thinner   Aspirin or another medication has been prescribed as a blood thinner to prevent blood clots in your leg or lungs. Take as prescribed on the bottle for 4 weeks. You will not receive a refill on this medication.   Do not take this medication if you are on another blood thinner.    ANTI NAUSEA    __________ Pantoprazole   Pantoprazole has been prescribed to help with nausea and protect your stomach while taking pain medication. Take one 40 mg tablet once daily for 4 weeks. You will not receive a refill on this medication.    ANTIBIOTIC     If you are deemed “high risk” for infection after surgery and antibiotic will be prescribed. Please take this as directed for one week.     STOOL SOFTENERS    __________ Senna   Post-operative constipation can result due to a combination of inactivity, anesthesia and pain medication. To help prevent this, you should increase your water and fiber intake. Physical activity such as walking will also help stimulate the bowels.    Senna has been prescribed to help with constipation while on Oxycodone and Tramadol. Take one tablet twice daily for 4 weeks. No refills will be given.   You may also take Miralax in combination with Senna to prevent constipation.  This can be purchased over the counter  at your local pharmacy.  Take as instructed on the bottle.   Pain Medication Refills -[phone] or MyChart- Monday through Friday 7am-1pm    Medication refills will be sent upon receipt of your request during the times listed above. Due to the high call volume, you will not receive a call confirming prescription refills; please do not call multiple times.  Prescription refills may take a few hours to process, you may follow up with your pharmacy for pickup availability.    SAMPLE              The times below are an example of how to organize medications to optimize pain control  Your actual medication schedule may vary based on your last dose taken IN THE HOSPITAL      Time 3:00am 6:00am 9:00am 12:00pm 3:00pm 6:00pm 9:00pm 12:00am   Medications Tramadol Acetaminophen (Tylenol)   Oxycodone  Miralax   Blood Thinner  Colace  Pantoprazole  Tramadol  Meloxicam Acetaminophen (Tylenol)   Oxycodone Tramadol Acetaminophen (Tylenol)   Oxycodone  Miralax Blood Thinner  Colace  Tramadol   Acetaminophen (Tylenol)   Oxycodone            You may begin to wean off the pain medication as your pain remains controlled with increased activity.  The schedules provided are meant to serve as an example.  You may wean off based on your pain control.  Please note that pain medications are not filled beyond 6 weeks after surgery.              The times below are an example of how to WEAN OFF medications WHILE CONTINUING TO OPTIMIZE PAIN CONTROL.  Your actual medication schedule may vary based on your last dose taken.  Time 12:00am 4:00am 8:00am 12:00pm 4:00pm 8:00pm   Med Tramadol Oxycodone   Tramadol Oxycodone Tramadol Oxycodone     Time 12:00am 6:00am 12:00pm 6:00pm   Med Tramadol Oxycodone   Tramadol Oxycodone     Time 12:00am 8:00am 4:00pm   Med Tramadol Oxycodone   Tramadol     Time 12:00am 12:00pm   Med Tramadol Tramadol        _________________________________________________________________________________________________________________________________________________________________________    OFFICE INFORMATION    OFFICE LOCATIONS    Location 1: [facility]  [address], [address]  [place] [address]  Office Number: [phone]    Location 2: [facility]  [address] [place] [address] [address]  [facility], [address]  Office Number: [phone]    Location 3: [facility]  [address] [place] [address]  Office Number: [phone]

## 2024-05-17 ENCOUNTER — DOCUMENTATION (OUTPATIENT)
Dept: HOME HEALTH SERVICES | Facility: HOME HEALTH | Age: 44
End: 2024-05-17

## 2024-05-17 ENCOUNTER — HOSPITAL ENCOUNTER (OUTPATIENT)
Facility: HOSPITAL | Age: 44
Discharge: HOME | End: 2024-05-18
Attending: ORTHOPAEDIC SURGERY | Admitting: ORTHOPAEDIC SURGERY
Payer: MEDICARE

## 2024-05-17 ENCOUNTER — ANESTHESIA (OUTPATIENT)
Dept: OPERATING ROOM | Facility: HOSPITAL | Age: 44
End: 2024-05-17
Payer: MEDICARE

## 2024-05-17 ENCOUNTER — HOME HEALTH ADMISSION (OUTPATIENT)
Dept: HOME HEALTH SERVICES | Facility: HOME HEALTH | Age: 44
End: 2024-05-17
Payer: MEDICARE

## 2024-05-17 ENCOUNTER — PHARMACY VISIT (OUTPATIENT)
Dept: PHARMACY | Facility: CLINIC | Age: 44
End: 2024-05-17
Payer: COMMERCIAL

## 2024-05-17 ENCOUNTER — ANESTHESIA EVENT (OUTPATIENT)
Dept: OPERATING ROOM | Facility: HOSPITAL | Age: 44
End: 2024-05-17
Payer: MEDICARE

## 2024-05-17 ENCOUNTER — APPOINTMENT (OUTPATIENT)
Dept: RADIOLOGY | Facility: HOSPITAL | Age: 44
End: 2024-05-17
Payer: MEDICARE

## 2024-05-17 DIAGNOSIS — M16.12 PRIMARY OSTEOARTHRITIS OF LEFT HIP: Primary | ICD-10-CM

## 2024-05-17 PROCEDURE — 94640 AIRWAY INHALATION TREATMENT: CPT | Mod: 59

## 2024-05-17 PROCEDURE — 2500000001 HC RX 250 WO HCPCS SELF ADMINISTERED DRUGS (ALT 637 FOR MEDICARE OP): Performed by: ORTHOPAEDIC SURGERY

## 2024-05-17 PROCEDURE — 94760 N-INVAS EAR/PLS OXIMETRY 1: CPT | Mod: 59

## 2024-05-17 PROCEDURE — 72170 X-RAY EXAM OF PELVIS: CPT | Performed by: RADIOLOGY

## 2024-05-17 PROCEDURE — A27130 PR TOTAL HIP ARTHROPLASTY: Performed by: ANESTHESIOLOGY

## 2024-05-17 PROCEDURE — 2500000001 HC RX 250 WO HCPCS SELF ADMINISTERED DRUGS (ALT 637 FOR MEDICARE OP)

## 2024-05-17 PROCEDURE — 2500000002 HC RX 250 W HCPCS SELF ADMINISTERED DRUGS (ALT 637 FOR MEDICARE OP, ALT 636 FOR OP/ED): Performed by: ORTHOPAEDIC SURGERY

## 2024-05-17 PROCEDURE — C1713 ANCHOR/SCREW BN/BN,TIS/BN: HCPCS | Performed by: ORTHOPAEDIC SURGERY

## 2024-05-17 PROCEDURE — 97162 PT EVAL MOD COMPLEX 30 MIN: CPT | Mod: GP

## 2024-05-17 PROCEDURE — 97110 THERAPEUTIC EXERCISES: CPT | Mod: GP

## 2024-05-17 PROCEDURE — 2500000001 HC RX 250 WO HCPCS SELF ADMINISTERED DRUGS (ALT 637 FOR MEDICARE OP): Performed by: ANESTHESIOLOGY

## 2024-05-17 PROCEDURE — 2500000005 HC RX 250 GENERAL PHARMACY W/O HCPCS: Performed by: ORTHOPAEDIC SURGERY

## 2024-05-17 PROCEDURE — A27130 PR TOTAL HIP ARTHROPLASTY: Performed by: ANESTHESIOLOGIST ASSISTANT

## 2024-05-17 PROCEDURE — 2500000004 HC RX 250 GENERAL PHARMACY W/ HCPCS (ALT 636 FOR OP/ED): Mod: JZ | Performed by: ORTHOPAEDIC SURGERY

## 2024-05-17 PROCEDURE — C1776 JOINT DEVICE (IMPLANTABLE): HCPCS | Performed by: ORTHOPAEDIC SURGERY

## 2024-05-17 PROCEDURE — 7100000011 HC EXTENDED STAY RECOVERY HOURLY - NURSING UNIT

## 2024-05-17 PROCEDURE — G0378 HOSPITAL OBSERVATION PER HR: HCPCS

## 2024-05-17 PROCEDURE — 2500000002 HC RX 250 W HCPCS SELF ADMINISTERED DRUGS (ALT 637 FOR MEDICARE OP, ALT 636 FOR OP/ED): Mod: MUE | Performed by: PHYSICIAN ASSISTANT

## 2024-05-17 PROCEDURE — 7100000001 HC RECOVERY ROOM TIME - INITIAL BASE CHARGE: Performed by: ORTHOPAEDIC SURGERY

## 2024-05-17 PROCEDURE — 2780000003 HC OR 278 NO HCPCS: Performed by: ORTHOPAEDIC SURGERY

## 2024-05-17 PROCEDURE — 27130 TOTAL HIP ARTHROPLASTY: CPT | Performed by: ORTHOPAEDIC SURGERY

## 2024-05-17 PROCEDURE — 2500000002 HC RX 250 W HCPCS SELF ADMINISTERED DRUGS (ALT 637 FOR MEDICARE OP, ALT 636 FOR OP/ED): Performed by: ANESTHESIOLOGY

## 2024-05-17 PROCEDURE — A4649 SURGICAL SUPPLIES: HCPCS | Performed by: ORTHOPAEDIC SURGERY

## 2024-05-17 PROCEDURE — 94667 MNPJ CHEST WALL 1ST: CPT

## 2024-05-17 PROCEDURE — 7100000002 HC RECOVERY ROOM TIME - EACH INCREMENTAL 1 MINUTE: Performed by: ORTHOPAEDIC SURGERY

## 2024-05-17 PROCEDURE — 2500000005 HC RX 250 GENERAL PHARMACY W/O HCPCS: Performed by: ANESTHESIOLOGIST ASSISTANT

## 2024-05-17 PROCEDURE — RXMED WILLOW AMBULATORY MEDICATION CHARGE

## 2024-05-17 PROCEDURE — 3700000002 HC GENERAL ANESTHESIA TIME - EACH INCREMENTAL 1 MINUTE: Performed by: ORTHOPAEDIC SURGERY

## 2024-05-17 PROCEDURE — 2500000006 HC RX 250 W HCPCS SELF ADMINISTERED DRUGS (ALT 637 FOR ALL PAYERS): Performed by: PHYSICIAN ASSISTANT

## 2024-05-17 PROCEDURE — 2500000004 HC RX 250 GENERAL PHARMACY W/ HCPCS (ALT 636 FOR OP/ED): Performed by: ANESTHESIOLOGIST ASSISTANT

## 2024-05-17 PROCEDURE — 3700000001 HC GENERAL ANESTHESIA TIME - INITIAL BASE CHARGE: Performed by: ORTHOPAEDIC SURGERY

## 2024-05-17 PROCEDURE — 72170 X-RAY EXAM OF PELVIS: CPT

## 2024-05-17 PROCEDURE — 3600000017 HC OR TIME - EACH INCREMENTAL 1 MINUTE - PROCEDURE LEVEL SIX: Performed by: ORTHOPAEDIC SURGERY

## 2024-05-17 PROCEDURE — 2720000007 HC OR 272 NO HCPCS: Performed by: ORTHOPAEDIC SURGERY

## 2024-05-17 PROCEDURE — 2500000004 HC RX 250 GENERAL PHARMACY W/ HCPCS (ALT 636 FOR OP/ED): Performed by: ANESTHESIOLOGY

## 2024-05-17 PROCEDURE — 3600000018 HC OR TIME - INITIAL BASE CHARGE - PROCEDURE LEVEL SIX: Performed by: ORTHOPAEDIC SURGERY

## 2024-05-17 DEVICE — KNEE TIBIAL CHECKPOINT: Type: IMPLANTABLE DEVICE | Site: HIP | Status: NON-FUNCTIONAL

## 2024-05-17 DEVICE — 127 DEGREE NECK ANGLE HIP STEM
Type: IMPLANTABLE DEVICE | Site: HIP | Status: FUNCTIONAL
Brand: ACCOLADE

## 2024-05-17 DEVICE — TRIDENT X3 0 DEGREE POLYETHYLENE INSERT
Type: IMPLANTABLE DEVICE | Site: HIP | Status: FUNCTIONAL
Brand: TRIDENT X3 INSERT

## 2024-05-17 DEVICE — 6.5MM LOW PROFILE HEX SCREW 25MM
Type: IMPLANTABLE DEVICE | Site: HIP | Status: FUNCTIONAL
Brand: TRIDENT II

## 2024-05-17 DEVICE — TRIDENT II TRITANIUM CLUSTER 52E
Type: IMPLANTABLE DEVICE | Site: HIP | Status: FUNCTIONAL
Brand: TRIDENT II

## 2024-05-17 DEVICE — CERAMIC V40 FEMORAL HEAD
Type: IMPLANTABLE DEVICE | Site: HIP | Status: FUNCTIONAL
Brand: BIOLOX

## 2024-05-17 RX ORDER — SCOLOPAMINE TRANSDERMAL SYSTEM 1 MG/1
1 PATCH, EXTENDED RELEASE TRANSDERMAL
Status: DISCONTINUED | OUTPATIENT
Start: 2024-05-17 | End: 2024-05-18 | Stop reason: HOSPADM

## 2024-05-17 RX ORDER — METOPROLOL TARTRATE 1 MG/ML
INJECTION, SOLUTION INTRAVENOUS AS NEEDED
Status: DISCONTINUED | OUTPATIENT
Start: 2024-05-17 | End: 2024-05-17

## 2024-05-17 RX ORDER — ENOXAPARIN SODIUM 100 MG/ML
40 INJECTION SUBCUTANEOUS DAILY
Status: DISCONTINUED | OUTPATIENT
Start: 2024-05-18 | End: 2024-05-18 | Stop reason: HOSPADM

## 2024-05-17 RX ORDER — ONDANSETRON HYDROCHLORIDE 2 MG/ML
4 INJECTION, SOLUTION INTRAVENOUS ONCE AS NEEDED
Status: DISCONTINUED | OUTPATIENT
Start: 2024-05-17 | End: 2024-05-17 | Stop reason: HOSPADM

## 2024-05-17 RX ORDER — ROPIVACAINE/EPI/CLONIDINE/KET 2.46-0.005
SYRINGE (ML) INJECTION AS NEEDED
Status: DISCONTINUED | OUTPATIENT
Start: 2024-05-17 | End: 2024-05-17 | Stop reason: HOSPADM

## 2024-05-17 RX ORDER — METOCLOPRAMIDE 10 MG/1
10 TABLET ORAL EVERY 6 HOURS PRN
Status: DISCONTINUED | OUTPATIENT
Start: 2024-05-17 | End: 2024-05-18 | Stop reason: HOSPADM

## 2024-05-17 RX ORDER — VANCOMYCIN HYDROCHLORIDE 1 G/20ML
INJECTION, POWDER, LYOPHILIZED, FOR SOLUTION INTRAVENOUS AS NEEDED
Status: DISCONTINUED | OUTPATIENT
Start: 2024-05-17 | End: 2024-05-17 | Stop reason: HOSPADM

## 2024-05-17 RX ORDER — METOCLOPRAMIDE HYDROCHLORIDE 5 MG/ML
10 INJECTION INTRAMUSCULAR; INTRAVENOUS EVERY 6 HOURS PRN
Status: DISCONTINUED | OUTPATIENT
Start: 2024-05-17 | End: 2024-05-18 | Stop reason: HOSPADM

## 2024-05-17 RX ORDER — DIPHENHYDRAMINE HYDROCHLORIDE 50 MG/ML
12.5 INJECTION INTRAMUSCULAR; INTRAVENOUS EVERY 6 HOURS PRN
Status: DISCONTINUED | OUTPATIENT
Start: 2024-05-17 | End: 2024-05-18 | Stop reason: HOSPADM

## 2024-05-17 RX ORDER — TALC
3 POWDER (GRAM) TOPICAL NIGHTLY PRN
Status: DISCONTINUED | OUTPATIENT
Start: 2024-05-17 | End: 2024-05-18 | Stop reason: HOSPADM

## 2024-05-17 RX ORDER — IPRATROPIUM BROMIDE AND ALBUTEROL SULFATE 2.5; .5 MG/3ML; MG/3ML
3 SOLUTION RESPIRATORY (INHALATION)
Status: DISCONTINUED | OUTPATIENT
Start: 2024-05-17 | End: 2024-05-18 | Stop reason: HOSPADM

## 2024-05-17 RX ORDER — PREGABALIN 75 MG/1
75 CAPSULE ORAL ONCE
Status: COMPLETED | OUTPATIENT
Start: 2024-05-17 | End: 2024-05-17

## 2024-05-17 RX ORDER — NALOXONE HYDROCHLORIDE 0.4 MG/ML
0.2 INJECTION, SOLUTION INTRAMUSCULAR; INTRAVENOUS; SUBCUTANEOUS EVERY 5 MIN PRN
Status: DISCONTINUED | OUTPATIENT
Start: 2024-05-17 | End: 2024-05-18 | Stop reason: HOSPADM

## 2024-05-17 RX ORDER — GLYCOPYRROLATE 0.2 MG/ML
INJECTION INTRAMUSCULAR; INTRAVENOUS AS NEEDED
Status: DISCONTINUED | OUTPATIENT
Start: 2024-05-17 | End: 2024-05-17

## 2024-05-17 RX ORDER — CEFAZOLIN SODIUM 2 G/100ML
2 INJECTION, SOLUTION INTRAVENOUS EVERY 8 HOURS
Qty: 200 ML | Refills: 0 | Status: COMPLETED | OUTPATIENT
Start: 2024-05-17 | End: 2024-05-18

## 2024-05-17 RX ORDER — VANCOMYCIN 1.5 G/300ML
1.5 INJECTION, SOLUTION INTRAVENOUS ONCE
Status: COMPLETED | OUTPATIENT
Start: 2024-05-17 | End: 2024-05-17

## 2024-05-17 RX ORDER — SODIUM CHLORIDE, SODIUM LACTATE, POTASSIUM CHLORIDE, CALCIUM CHLORIDE 600; 310; 30; 20 MG/100ML; MG/100ML; MG/100ML; MG/100ML
100 INJECTION, SOLUTION INTRAVENOUS CONTINUOUS
Status: DISCONTINUED | OUTPATIENT
Start: 2024-05-17 | End: 2024-05-18 | Stop reason: HOSPADM

## 2024-05-17 RX ORDER — PROPOFOL 10 MG/ML
INJECTION, EMULSION INTRAVENOUS AS NEEDED
Status: DISCONTINUED | OUTPATIENT
Start: 2024-05-17 | End: 2024-05-17

## 2024-05-17 RX ORDER — ACETAMINOPHEN 325 MG/1
975 TABLET ORAL ONCE
Status: COMPLETED | OUTPATIENT
Start: 2024-05-17 | End: 2024-05-17

## 2024-05-17 RX ORDER — OXYCODONE HYDROCHLORIDE 5 MG/1
10 TABLET ORAL EVERY 4 HOURS PRN
Status: DISCONTINUED | OUTPATIENT
Start: 2024-05-17 | End: 2024-05-18 | Stop reason: HOSPADM

## 2024-05-17 RX ORDER — CELECOXIB 200 MG/1
400 CAPSULE ORAL ONCE
Status: COMPLETED | OUTPATIENT
Start: 2024-05-17 | End: 2024-05-17

## 2024-05-17 RX ORDER — CEFAZOLIN SODIUM 2 G/100ML
2 INJECTION, SOLUTION INTRAVENOUS ONCE
Status: COMPLETED | OUTPATIENT
Start: 2024-05-17 | End: 2024-05-17

## 2024-05-17 RX ORDER — BUPIVACAINE HYDROCHLORIDE 7.5 MG/ML
INJECTION, SOLUTION EPIDURAL; RETROBULBAR AS NEEDED
Status: DISCONTINUED | OUTPATIENT
Start: 2024-05-17 | End: 2024-05-17

## 2024-05-17 RX ORDER — LIDOCAINE HYDROCHLORIDE 10 MG/ML
INJECTION, SOLUTION EPIDURAL; INFILTRATION; INTRACAUDAL; PERINEURAL AS NEEDED
Status: DISCONTINUED | OUTPATIENT
Start: 2024-05-17 | End: 2024-05-17

## 2024-05-17 RX ORDER — BUPIVACAINE HCL/EPINEPHRINE 0.5-1:200K
VIAL (ML) INJECTION AS NEEDED
Status: DISCONTINUED | OUTPATIENT
Start: 2024-05-17 | End: 2024-05-17 | Stop reason: HOSPADM

## 2024-05-17 RX ORDER — L. ACIDOPHILUS/L.BULGARICUS 1MM CELL
1 TABLET ORAL 2 TIMES DAILY
Status: DISCONTINUED | OUTPATIENT
Start: 2024-05-17 | End: 2024-05-18 | Stop reason: HOSPADM

## 2024-05-17 RX ORDER — ACETAMINOPHEN 325 MG/1
650 TABLET ORAL EVERY 6 HOURS SCHEDULED
Status: DISCONTINUED | OUTPATIENT
Start: 2024-05-17 | End: 2024-05-18 | Stop reason: HOSPADM

## 2024-05-17 RX ORDER — ONDANSETRON HYDROCHLORIDE 2 MG/ML
4 INJECTION, SOLUTION INTRAVENOUS EVERY 8 HOURS PRN
Status: DISCONTINUED | OUTPATIENT
Start: 2024-05-17 | End: 2024-05-18 | Stop reason: HOSPADM

## 2024-05-17 RX ORDER — TRANEXAMIC ACID 100 MG/ML
1000 INJECTION, SOLUTION INTRAVENOUS 2 TIMES DAILY
Status: DISCONTINUED | OUTPATIENT
Start: 2024-05-17 | End: 2024-05-17 | Stop reason: HOSPADM

## 2024-05-17 RX ORDER — ALBUTEROL SULFATE 0.83 MG/ML
2.5 SOLUTION RESPIRATORY (INHALATION) ONCE
Status: COMPLETED | OUTPATIENT
Start: 2024-05-17 | End: 2024-05-17

## 2024-05-17 RX ORDER — OXYCODONE HCL 10 MG/1
10 TABLET, FILM COATED, EXTENDED RELEASE ORAL ONCE
Status: COMPLETED | OUTPATIENT
Start: 2024-05-17 | End: 2024-05-17

## 2024-05-17 RX ORDER — BISACODYL 5 MG
10 TABLET, DELAYED RELEASE (ENTERIC COATED) ORAL DAILY PRN
Status: DISCONTINUED | OUTPATIENT
Start: 2024-05-17 | End: 2024-05-18 | Stop reason: HOSPADM

## 2024-05-17 RX ORDER — CYCLOBENZAPRINE HCL 10 MG
5 TABLET ORAL 3 TIMES DAILY PRN
Status: DISCONTINUED | OUTPATIENT
Start: 2024-05-17 | End: 2024-05-18 | Stop reason: HOSPADM

## 2024-05-17 RX ORDER — BUTYROSPERMUM PARKII(SHEA BUTTER), SIMMONDSIA CHINENSIS (JOJOBA) SEED OIL, ALOE BARBADENSIS LEAF EXTRACT .01; 1; 3.5 G/100G; G/100G; G/100G
250 LIQUID TOPICAL 2 TIMES DAILY
Status: DISCONTINUED | OUTPATIENT
Start: 2024-05-17 | End: 2024-05-17

## 2024-05-17 RX ORDER — HYDRALAZINE HYDROCHLORIDE 20 MG/ML
10 INJECTION INTRAMUSCULAR; INTRAVENOUS EVERY 30 MIN PRN
Status: DISCONTINUED | OUTPATIENT
Start: 2024-05-17 | End: 2024-05-17 | Stop reason: HOSPADM

## 2024-05-17 RX ORDER — LABETALOL HYDROCHLORIDE 5 MG/ML
10 INJECTION, SOLUTION INTRAVENOUS ONCE AS NEEDED
Status: DISCONTINUED | OUTPATIENT
Start: 2024-05-17 | End: 2024-05-17 | Stop reason: HOSPADM

## 2024-05-17 RX ORDER — OXYCODONE HYDROCHLORIDE 5 MG/1
5 TABLET ORAL EVERY 4 HOURS PRN
Status: DISCONTINUED | OUTPATIENT
Start: 2024-05-17 | End: 2024-05-18 | Stop reason: HOSPADM

## 2024-05-17 RX ORDER — METOCLOPRAMIDE 10 MG/1
10 TABLET ORAL ONCE
Status: COMPLETED | OUTPATIENT
Start: 2024-05-17 | End: 2024-05-17

## 2024-05-17 RX ORDER — QUETIAPINE FUMARATE 100 MG/1
400 TABLET, FILM COATED ORAL NIGHTLY
Status: DISCONTINUED | OUTPATIENT
Start: 2024-05-17 | End: 2024-05-18 | Stop reason: HOSPADM

## 2024-05-17 RX ORDER — ALBUTEROL SULFATE 0.83 MG/ML
2.5 SOLUTION RESPIRATORY (INHALATION) ONCE AS NEEDED
Status: DISCONTINUED | OUTPATIENT
Start: 2024-05-17 | End: 2024-05-17 | Stop reason: HOSPADM

## 2024-05-17 RX ORDER — PANTOPRAZOLE SODIUM 40 MG/1
40 TABLET, DELAYED RELEASE ORAL
Status: DISCONTINUED | OUTPATIENT
Start: 2024-05-18 | End: 2024-05-18 | Stop reason: HOSPADM

## 2024-05-17 RX ORDER — VANCOMYCIN HYDROCHLORIDE 1 G/20ML
1 INJECTION, POWDER, LYOPHILIZED, FOR SOLUTION INTRAVENOUS ONCE
Status: DISCONTINUED | OUTPATIENT
Start: 2024-05-17 | End: 2024-05-17 | Stop reason: HOSPADM

## 2024-05-17 RX ORDER — MEPERIDINE HYDROCHLORIDE 25 MG/ML
12.5 INJECTION INTRAMUSCULAR; INTRAVENOUS; SUBCUTANEOUS EVERY 10 MIN PRN
Status: DISCONTINUED | OUTPATIENT
Start: 2024-05-17 | End: 2024-05-17 | Stop reason: HOSPADM

## 2024-05-17 RX ORDER — ALBUTEROL SULFATE 0.83 MG/ML
2.5 SOLUTION RESPIRATORY (INHALATION) EVERY 6 HOURS PRN
Status: DISCONTINUED | OUTPATIENT
Start: 2024-05-17 | End: 2024-05-17

## 2024-05-17 RX ORDER — FAMOTIDINE 20 MG/1
20 TABLET, FILM COATED ORAL ONCE
Status: COMPLETED | OUTPATIENT
Start: 2024-05-17 | End: 2024-05-17

## 2024-05-17 RX ORDER — DIPHENHYDRAMINE HYDROCHLORIDE 50 MG/ML
12.5 INJECTION INTRAMUSCULAR; INTRAVENOUS ONCE AS NEEDED
Status: DISCONTINUED | OUTPATIENT
Start: 2024-05-17 | End: 2024-05-17 | Stop reason: HOSPADM

## 2024-05-17 RX ORDER — ONDANSETRON HYDROCHLORIDE 2 MG/ML
INJECTION, SOLUTION INTRAVENOUS AS NEEDED
Status: DISCONTINUED | OUTPATIENT
Start: 2024-05-17 | End: 2024-05-17

## 2024-05-17 RX ORDER — SIMETHICONE 80 MG
80 TABLET,CHEWABLE ORAL 4 TIMES DAILY PRN
Status: DISCONTINUED | OUTPATIENT
Start: 2024-05-17 | End: 2024-05-18 | Stop reason: HOSPADM

## 2024-05-17 RX ORDER — MIDAZOLAM HYDROCHLORIDE 1 MG/ML
INJECTION, SOLUTION INTRAMUSCULAR; INTRAVENOUS AS NEEDED
Status: DISCONTINUED | OUTPATIENT
Start: 2024-05-17 | End: 2024-05-17

## 2024-05-17 RX ORDER — ONDANSETRON 4 MG/1
4 TABLET, ORALLY DISINTEGRATING ORAL EVERY 8 HOURS PRN
Status: DISCONTINUED | OUTPATIENT
Start: 2024-05-17 | End: 2024-05-18 | Stop reason: HOSPADM

## 2024-05-17 RX ORDER — KETAMINE HYDROCHLORIDE 50 MG/ML
INJECTION, SOLUTION INTRAMUSCULAR; INTRAVENOUS AS NEEDED
Status: DISCONTINUED | OUTPATIENT
Start: 2024-05-17 | End: 2024-05-17

## 2024-05-17 RX ORDER — OXYCODONE HCL 10 MG/1
10 TABLET, FILM COATED, EXTENDED RELEASE ORAL ONCE AS NEEDED
Status: DISCONTINUED | OUTPATIENT
Start: 2024-05-17 | End: 2024-05-17 | Stop reason: HOSPADM

## 2024-05-17 RX ORDER — ALUMINUM HYDROXIDE, MAGNESIUM HYDROXIDE, AND SIMETHICONE 1200; 120; 1200 MG/30ML; MG/30ML; MG/30ML
20 SUSPENSION ORAL EVERY 4 HOURS PRN
Status: DISCONTINUED | OUTPATIENT
Start: 2024-05-17 | End: 2024-05-18 | Stop reason: HOSPADM

## 2024-05-17 RX ORDER — DIPHENHYDRAMINE HCL 25 MG
12.5 TABLET ORAL EVERY 6 HOURS PRN
Status: DISCONTINUED | OUTPATIENT
Start: 2024-05-17 | End: 2024-05-18 | Stop reason: HOSPADM

## 2024-05-17 RX ORDER — SENNOSIDES 8.6 MG/1
2 TABLET ORAL 2 TIMES DAILY
Status: DISCONTINUED | OUTPATIENT
Start: 2024-05-17 | End: 2024-05-18 | Stop reason: HOSPADM

## 2024-05-17 RX ORDER — ALBUTEROL SULFATE 0.83 MG/ML
2.5 SOLUTION RESPIRATORY (INHALATION) EVERY 6 HOURS PRN
Status: DISCONTINUED | OUTPATIENT
Start: 2024-05-17 | End: 2024-05-18 | Stop reason: HOSPADM

## 2024-05-17 RX ORDER — KETOROLAC TROMETHAMINE 15 MG/ML
15 INJECTION, SOLUTION INTRAMUSCULAR; INTRAVENOUS ONCE AS NEEDED
Status: DISCONTINUED | OUTPATIENT
Start: 2024-05-17 | End: 2024-05-17 | Stop reason: HOSPADM

## 2024-05-17 RX ADMIN — PROPOFOL 30 MG: 10 INJECTION, EMULSION INTRAVENOUS at 12:08

## 2024-05-17 RX ADMIN — ONDANSETRON 4 MG: 2 INJECTION INTRAMUSCULAR; INTRAVENOUS at 13:47

## 2024-05-17 RX ADMIN — MIDAZOLAM 2 MG: 1 INJECTION INTRAMUSCULAR; INTRAVENOUS at 11:53

## 2024-05-17 RX ADMIN — CELECOXIB 400 MG: 200 CAPSULE ORAL at 10:48

## 2024-05-17 RX ADMIN — PROPOFOL 300 MG: 10 INJECTION, EMULSION INTRAVENOUS at 13:20

## 2024-05-17 RX ADMIN — CEFAZOLIN SODIUM 2 G: 2 INJECTION, SOLUTION INTRAVENOUS at 12:05

## 2024-05-17 RX ADMIN — SENNOSIDES 17.2 MG: 8.6 TABLET, FILM COATED ORAL at 20:44

## 2024-05-17 RX ADMIN — CEFAZOLIN SODIUM 2 G: 2 INJECTION, SOLUTION INTRAVENOUS at 20:17

## 2024-05-17 RX ADMIN — PROPOFOL 400 MG: 10 INJECTION, EMULSION INTRAVENOUS at 12:12

## 2024-05-17 RX ADMIN — GLYCOPYRROLATE 0.2 MG: 0.2 INJECTION INTRAMUSCULAR; INTRAVENOUS at 12:09

## 2024-05-17 RX ADMIN — SODIUM CHLORIDE, SODIUM LACTATE, POTASSIUM CHLORIDE, AND CALCIUM CHLORIDE 100 ML/HR: 600; 310; 30; 20 INJECTION, SOLUTION INTRAVENOUS at 10:23

## 2024-05-17 RX ADMIN — PREGABALIN 75 MG: 75 CAPSULE ORAL at 10:48

## 2024-05-17 RX ADMIN — MIDAZOLAM 2 MG: 1 INJECTION INTRAMUSCULAR; INTRAVENOUS at 11:49

## 2024-05-17 RX ADMIN — LIDOCAINE HYDROCHLORIDE 5 ML: 10 INJECTION, SOLUTION EPIDURAL; INFILTRATION; INTRACAUDAL; PERINEURAL at 12:08

## 2024-05-17 RX ADMIN — METOCLOPRAMIDE 10 MG: 10 TABLET ORAL at 16:04

## 2024-05-17 RX ADMIN — METOCLOPRAMIDE 10 MG: 10 TABLET ORAL at 10:24

## 2024-05-17 RX ADMIN — PROPOFOL 30 MG: 10 INJECTION, EMULSION INTRAVENOUS at 12:11

## 2024-05-17 RX ADMIN — HYDROMORPHONE HYDROCHLORIDE 0.5 MG: 1 INJECTION, SOLUTION INTRAMUSCULAR; INTRAVENOUS; SUBCUTANEOUS at 14:25

## 2024-05-17 RX ADMIN — PROPOFOL 20 MG: 10 INJECTION, EMULSION INTRAVENOUS at 12:10

## 2024-05-17 RX ADMIN — ALBUTEROL SULFATE 2.5 MG: 2.5 SOLUTION RESPIRATORY (INHALATION) at 16:50

## 2024-05-17 RX ADMIN — IPRATROPIUM BROMIDE AND ALBUTEROL SULFATE 3 ML: 2.5; .5 SOLUTION RESPIRATORY (INHALATION) at 20:10

## 2024-05-17 RX ADMIN — OXYCODONE HYDROCHLORIDE 10 MG: 10 TABLET, FILM COATED, EXTENDED RELEASE ORAL at 10:48

## 2024-05-17 RX ADMIN — HYDROMORPHONE HYDROCHLORIDE 0.5 MG: 1 INJECTION, SOLUTION INTRAMUSCULAR; INTRAVENOUS; SUBCUTANEOUS at 14:15

## 2024-05-17 RX ADMIN — PROPOFOL 20 MG: 10 INJECTION, EMULSION INTRAVENOUS at 12:09

## 2024-05-17 RX ADMIN — TRANEXAMIC ACID 1000 MG: 1 INJECTION, SOLUTION INTRAVENOUS at 13:40

## 2024-05-17 RX ADMIN — KETAMINE HYDROCHLORIDE 25 MG: 50 INJECTION, SOLUTION INTRAMUSCULAR; INTRAVENOUS at 12:57

## 2024-05-17 RX ADMIN — FAMOTIDINE 20 MG: 20 TABLET, FILM COATED ORAL at 10:24

## 2024-05-17 RX ADMIN — BUPIVACAINE HYDROCHLORIDE 1.7 ML: 7.5 INJECTION, SOLUTION EPIDURAL; RETROBULBAR at 11:59

## 2024-05-17 RX ADMIN — METOPROLOL TARTRATE 2 MG: 1 INJECTION, SOLUTION INTRAVENOUS at 12:30

## 2024-05-17 RX ADMIN — ACETAMINOPHEN 975 MG: 325 TABLET ORAL at 10:48

## 2024-05-17 RX ADMIN — OXYCODONE 5 MG: 5 TABLET ORAL at 20:44

## 2024-05-17 RX ADMIN — OXYCODONE 5 MG: 5 TABLET ORAL at 15:59

## 2024-05-17 RX ADMIN — QUETIAPINE FUMARATE 400 MG: 100 TABLET ORAL at 22:11

## 2024-05-17 RX ADMIN — ACETAMINOPHEN 650 MG: 325 TABLET ORAL at 18:24

## 2024-05-17 RX ADMIN — DEXAMETHASONE SODIUM PHOSPHATE 4 MG: 4 INJECTION, SOLUTION INTRAMUSCULAR; INTRAVENOUS at 13:47

## 2024-05-17 RX ADMIN — KETAMINE HYDROCHLORIDE 25 MG: 50 INJECTION, SOLUTION INTRAMUSCULAR; INTRAVENOUS at 13:46

## 2024-05-17 RX ADMIN — Medication 1 TABLET: at 20:44

## 2024-05-17 RX ADMIN — VANCOMYCIN 1.5 G: 1.5 INJECTION, SOLUTION INTRAVENOUS at 10:39

## 2024-05-17 RX ADMIN — HYDROMORPHONE HYDROCHLORIDE 0.5 MG: 1 INJECTION, SOLUTION INTRAMUSCULAR; INTRAVENOUS; SUBCUTANEOUS at 14:10

## 2024-05-17 RX ADMIN — MIDAZOLAM 2 MG: 1 INJECTION INTRAMUSCULAR; INTRAVENOUS at 12:19

## 2024-05-17 RX ADMIN — Medication 2 L/MIN: at 15:45

## 2024-05-17 RX ADMIN — METOPROLOL TARTRATE 3 MG: 1 INJECTION, SOLUTION INTRAVENOUS at 13:00

## 2024-05-17 RX ADMIN — KETAMINE HYDROCHLORIDE 25 MG: 50 INJECTION, SOLUTION INTRAMUSCULAR; INTRAVENOUS at 12:20

## 2024-05-17 RX ADMIN — ALBUTEROL SULFATE 2.5 MG: 2.5 SOLUTION RESPIRATORY (INHALATION) at 10:24

## 2024-05-17 RX ADMIN — SCOPALAMINE 1 PATCH: 1 PATCH, EXTENDED RELEASE TRANSDERMAL at 10:24

## 2024-05-17 RX ADMIN — SODIUM CHLORIDE, SODIUM LACTATE, POTASSIUM CHLORIDE, AND CALCIUM CHLORIDE 100 ML/HR: 600; 310; 30; 20 INJECTION, SOLUTION INTRAVENOUS at 15:59

## 2024-05-17 SDOH — HEALTH STABILITY: MENTAL HEALTH: CURRENT SMOKER: 1

## 2024-05-17 SDOH — SOCIAL STABILITY: SOCIAL INSECURITY: ARE THERE ANY APPARENT SIGNS OF INJURIES/BEHAVIORS THAT COULD BE RELATED TO ABUSE/NEGLECT?: NO

## 2024-05-17 SDOH — SOCIAL STABILITY: SOCIAL INSECURITY: HAVE YOU HAD ANY THOUGHTS OF HARMING ANYONE ELSE?: NO

## 2024-05-17 SDOH — SOCIAL STABILITY: SOCIAL INSECURITY: WERE YOU ABLE TO COMPLETE ALL THE BEHAVIORAL HEALTH SCREENINGS?: YES

## 2024-05-17 SDOH — SOCIAL STABILITY: SOCIAL INSECURITY: DO YOU FEEL UNSAFE GOING BACK TO THE PLACE WHERE YOU ARE LIVING?: NO

## 2024-05-17 SDOH — SOCIAL STABILITY: SOCIAL INSECURITY: HAVE YOU HAD THOUGHTS OF HARMING ANYONE ELSE?: NO

## 2024-05-17 SDOH — SOCIAL STABILITY: SOCIAL INSECURITY: DOES ANYONE TRY TO KEEP YOU FROM HAVING/CONTACTING OTHER FRIENDS OR DOING THINGS OUTSIDE YOUR HOME?: NO

## 2024-05-17 SDOH — SOCIAL STABILITY: SOCIAL INSECURITY: ARE YOU OR HAVE YOU BEEN THREATENED OR ABUSED PHYSICALLY, EMOTIONALLY, OR SEXUALLY BY ANYONE?: NO

## 2024-05-17 SDOH — SOCIAL STABILITY: SOCIAL INSECURITY: HAS ANYONE EVER THREATENED TO HURT YOUR FAMILY OR YOUR PETS?: NO

## 2024-05-17 SDOH — SOCIAL STABILITY: SOCIAL INSECURITY: ABUSE: ADULT

## 2024-05-17 SDOH — SOCIAL STABILITY: SOCIAL INSECURITY: DO YOU FEEL ANYONE HAS EXPLOITED OR TAKEN ADVANTAGE OF YOU FINANCIALLY OR OF YOUR PERSONAL PROPERTY?: NO

## 2024-05-17 ASSESSMENT — COGNITIVE AND FUNCTIONAL STATUS - GENERAL
MOBILITY SCORE: 18
STANDING UP FROM CHAIR USING ARMS: A LITTLE
STANDING UP FROM CHAIR USING ARMS: A LITTLE
MOBILITY SCORE: 20
STANDING UP FROM CHAIR USING ARMS: A LITTLE
CLIMB 3 TO 5 STEPS WITH RAILING: A LITTLE
WALKING IN HOSPITAL ROOM: A LITTLE
HELP NEEDED FOR BATHING: A LITTLE
MOVING TO AND FROM BED TO CHAIR: A LITTLE
TOILETING: A LITTLE
TOILETING: A LITTLE
DAILY ACTIVITIY SCORE: 19
MOVING TO AND FROM BED TO CHAIR: A LITTLE
PERSONAL GROOMING: A LITTLE
WALKING IN HOSPITAL ROOM: A LITTLE
DRESSING REGULAR LOWER BODY CLOTHING: A LITTLE
PATIENT BASELINE BEDBOUND: NO
MOVING TO AND FROM BED TO CHAIR: A LITTLE
CLIMB 3 TO 5 STEPS WITH RAILING: A LITTLE
DRESSING REGULAR LOWER BODY CLOTHING: A LITTLE
WALKING IN HOSPITAL ROOM: A LITTLE
MOVING FROM LYING ON BACK TO SITTING ON SIDE OF FLAT BED WITH BEDRAILS: A LITTLE
MOBILITY SCORE: 20
HELP NEEDED FOR BATHING: A LITTLE
DRESSING REGULAR UPPER BODY CLOTHING: A LITTLE
CLIMB 3 TO 5 STEPS WITH RAILING: A LITTLE
DAILY ACTIVITIY SCORE: 21
TURNING FROM BACK TO SIDE WHILE IN FLAT BAD: A LITTLE

## 2024-05-17 ASSESSMENT — PAIN DESCRIPTION - ORIENTATION: ORIENTATION: LEFT

## 2024-05-17 ASSESSMENT — ACTIVITIES OF DAILY LIVING (ADL)
TOILETING: NEEDS ASSISTANCE
HEARING - LEFT EAR: FUNCTIONAL
HEARING - RIGHT EAR: FUNCTIONAL
JUDGMENT_ADEQUATE_SAFELY_COMPLETE_DAILY_ACTIVITIES: YES
WALKS IN HOME: NEEDS ASSISTANCE
BATHING: NEEDS ASSISTANCE
ADEQUATE_TO_COMPLETE_ADL: YES
ADL_ASSISTANCE: INDEPENDENT
LACK_OF_TRANSPORTATION: NO
GROOMING: NEEDS ASSISTANCE
ASSISTIVE_DEVICE: WALKER
FEEDING YOURSELF: INDEPENDENT
PATIENT'S MEMORY ADEQUATE TO SAFELY COMPLETE DAILY ACTIVITIES?: YES
DRESSING YOURSELF: NEEDS ASSISTANCE

## 2024-05-17 ASSESSMENT — PAIN SCALES - GENERAL
PAINLEVEL_OUTOF10: 4
PAINLEVEL_OUTOF10: 7
PAINLEVEL_OUTOF10: 5 - MODERATE PAIN
PAINLEVEL_OUTOF10: 7
PAINLEVEL_OUTOF10: 4
PAINLEVEL_OUTOF10: 2
PAINLEVEL_OUTOF10: 4
PAINLEVEL_OUTOF10: 4
PAINLEVEL_OUTOF10: 2
PAINLEVEL_OUTOF10: 4
PAINLEVEL_OUTOF10: 7
PAINLEVEL_OUTOF10: 5 - MODERATE PAIN
PAINLEVEL_OUTOF10: 7

## 2024-05-17 ASSESSMENT — PAIN - FUNCTIONAL ASSESSMENT
PAIN_FUNCTIONAL_ASSESSMENT: 0-10
PAIN_FUNCTIONAL_ASSESSMENT: CPOT (CRITICAL CARE PAIN OBSERVATION TOOL)
PAIN_FUNCTIONAL_ASSESSMENT: 0-10

## 2024-05-17 ASSESSMENT — LIFESTYLE VARIABLES
HOW OFTEN DO YOU HAVE A DRINK CONTAINING ALCOHOL: 2-3 TIMES A WEEK
SKIP TO QUESTIONS 9-10: 1
PRESCIPTION_ABUSE_PAST_12_MONTHS: NO
HOW MANY STANDARD DRINKS CONTAINING ALCOHOL DO YOU HAVE ON A TYPICAL DAY: 1 OR 2
HOW OFTEN DURING THE LAST YEAR HAVE YOU FAILED TO DO WHAT WAS NORMALLY EXPECTED FROM YOU BECAUSE OF DRINKING: NEVER
AUDIT TOTAL SCORE: 0
HAS A RELATIVE, FRIEND, DOCTOR, OR ANOTHER HEALTH PROFESSIONAL EXPRESSED CONCERN ABOUT YOUR DRINKING OR SUGGESTED YOU CUT DOWN: NO
HOW OFTEN DURING THE LAST YEAR HAVE YOU NEEDED AN ALCOHOLIC DRINK FIRST THING IN THE MORNING TO GET YOURSELF GOING AFTER A NIGHT OF HEAVY DRINKING: NEVER
HOW OFTEN DURING THE LAST YEAR HAVE YOU BEEN UNABLE TO REMEMBER WHAT HAPPENED THE NIGHT BEFORE BECAUSE YOU HAD BEEN DRINKING: NEVER
HAVE YOU OR SOMEONE ELSE BEEN INJURED AS A RESULT OF YOUR DRINKING: NO
AUDIT-C TOTAL SCORE: 3
HOW OFTEN DO YOU HAVE 6 OR MORE DRINKS ON ONE OCCASION: NEVER
HOW OFTEN DURING THE LAST YEAR HAVE YOU HAD A FEELING OF GUILT OR REMORSE AFTER DRINKING: NEVER
HOW OFTEN DURING THE LAST YEAR HAVE YOU FOUND THAT YOU WERE NOT ABLE TO STOP DRINKING ONCE YOU HAD STARTED: NEVER
SUBSTANCE_ABUSE_PAST_12_MONTHS: YES
AUDIT-C TOTAL SCORE: 3
AUDIT TOTAL SCORE: 3

## 2024-05-17 ASSESSMENT — PAIN DESCRIPTION - DESCRIPTORS
DESCRIPTORS: ACHING
DESCRIPTORS: ACHING

## 2024-05-17 ASSESSMENT — PAIN DESCRIPTION - LOCATION
LOCATION: HIP
LOCATION: HIP

## 2024-05-17 ASSESSMENT — COLUMBIA-SUICIDE SEVERITY RATING SCALE - C-SSRS
1. IN THE PAST MONTH, HAVE YOU WISHED YOU WERE DEAD OR WISHED YOU COULD GO TO SLEEP AND NOT WAKE UP?: NO
6. HAVE YOU EVER DONE ANYTHING, STARTED TO DO ANYTHING, OR PREPARED TO DO ANYTHING TO END YOUR LIFE?: NO
2. HAVE YOU ACTUALLY HAD ANY THOUGHTS OF KILLING YOURSELF?: NO
1. IN THE PAST MONTH, HAVE YOU WISHED YOU WERE DEAD OR WISHED YOU COULD GO TO SLEEP AND NOT WAKE UP?: NO
2. HAVE YOU ACTUALLY HAD ANY THOUGHTS OF KILLING YOURSELF?: NO

## 2024-05-17 ASSESSMENT — PATIENT HEALTH QUESTIONNAIRE - PHQ9
1. LITTLE INTEREST OR PLEASURE IN DOING THINGS: NOT AT ALL
2. FEELING DOWN, DEPRESSED OR HOPELESS: NOT AT ALL
SUM OF ALL RESPONSES TO PHQ9 QUESTIONS 1 & 2: 0

## 2024-05-17 NOTE — OP NOTE
left TOTAL HIP ARTHROPLASTY     Date: 2024   OR Location: [facility]    Name: Chloe RAGSDALE Vinay  : 1980    MRN: [id number]    Diagnosis  Preop diagnosis: Posttraumatic arthritis, left hip  Postoperative diagnosis: Same    Procedures  Total Hip Arthroplasty, SCHEDULE: 2 HOURS, Extended recovery ( [place], [place], synthes screw removal tray availlable ) *iodine and latex allergy*  63928 - MI ARTHRP ACETBLR/PROX FEM PROSTC AGRFT/ALGRFT    A 22 modifier is being added to this case due to increased complexity secondary to post traumatic arthritis. Patient had previous surgical incision and scar tissue surrounding the hip secondary to previous surgical processes for fixation of pelvis fractures.  Given this status added increased time, complexity, added exposure and surgical risk that required advanced arthroplasty techniques to safely perform total hip replacement.      Surgeons      * Ramya Rendon - Primary     Specimen: none    Staff: Circulator: Priya Sanchez RN  Relief Circulator: Fabi Cooley RN  Relief Scrub: Vivian Workman PA-C  Scrub Person: Priya Puente; Marge Dodd     Drains and/or Catheters: none    Estimated Blood Loss: 150 cc    Resident/Fellow/Other Assistant:  Surgeons and Role:  * No surgeons found with a matching role *     Procedure Summary  Anesthesia: Spinal    Anesthesia Staff: Anesthesiologist: Brooks Danielle DO; Virgilio Dunham MD  C-AA: MARILY Linda   ASA: III       Intra-op Medications:   - 1 Gram Tranexamic acid prior to surgical incision  - 1 Gram Tranexamic acid at start of incision close  - MILY Pain cockail: ropivacaine-epinephrine-clonidine-ketorolac 2.46-0.005- 0.0008-0.3mg/mL periarticular syringe: 50 cc    IMPLANTS:   Implant Name Type Inv. Item Serial No.  Lot No. LRB No. Used Action   SCREW, LOW PROFILE HEX, 6.5 X 25 MM - [id number] Screw SCREW, LOW PROFILE HEX, 6.5 X 25 MM  [employer] [id number] Left 1 Implanted   SHELL,  TRIDENT II, CLUSTERHOLE, 52E - [id number] Joint SHELL, TRIDENT II, CLUSTERHOLE, 52E  [employer]IN [id number] Left 1 Implanted   INSERT, TRIDENT X3 POLYETHYLENE, 0 DEG, 36MM E - [id number] Joint INSERT, TRIDENT X3 POLYETHYLENE, 0 DEG, 36MM E  [employer] [id number] Left 1 Implanted   HEAD, FEMUR V40 36MM +2.5MM BIOLOX DELTA - [id number] Joint HEAD, FEMUR V40 36MM +2.5MM BIOLOX DELTA  [employer] [id number] Left 1 Implanted   STEM, ACCOLADE II, 127DEG,SZ 6 - [id number] Joint STEM, ACCOLADE II, 127DEG,SZ 6  [employer] [id number] Left 1 Implanted       Findings: See operative note    Operative Indications:  Chloe Mclean is a patient that is well-known to me and initially presented as an outpatient. They have been treated for advanced hip osteoarthritis with therapy, anti-inflammatories, and activity modification, all without improvement of symptoms.  Patient has known history of posttraumatic arthritis from previous pelvis fracture.  They are here for surgery for left hip posttraumatic arthritis.    We therefore reviewed the alternative option of elective total hip arthroplasty. The risks and benefits of this procedure were discussed in detail as an outpatient and are documented in our outpatient record. The patient expressed full understanding and wished to proceed. Informed consent was obtained and was placed on the medical chart    The risks, benefits and potential complications of the arthroplasty surgery were discussed with the patient in detail.  Risks including but not limited to the risk of anesthesia, DVT, pulmonary embolism with the possibility of death, retained infection, and neurovascular injury.  Specific details of the procedure, hospitalization, recovery, rehabilitation, and long-term precautions were also provided. Pre-operative teaching was provided. Implant/prosthesis selection was outlined, and the many options available were  explained; the final choice will be made at the time of the procedure to match the anatomy and condition of the bone, ligaments, tendons, and muscles. Understanding of all topics was conveyed to me by the patient, and consent was given to proceed with a total hip arthroplasty.     We specifically discussed that given the patient's previous pelvis fracture that we may require extensile exposure to proceed with her left hip replacement if we encounter screws and plates that would have to be removed.  We also discussed that in the event that there is any nonunion present even though this was visualized as a well-healed fracture preoperative that there is a small risk of postoperative or intraoperative fracture during preparation of the acetabular component    The use of robotic assisted surgery was discussed with the patient.  The risk, benefits were discussed regarding this.  Patient consented for this procedure.  We discussed specifically placement of pins and arrays for navigation during surgery and to help with the robotic assistance.  We discussed the use of an additional bandage to cover the pin sites.  We also reviewed that they may have some slight pain at the placement of pin sites that should improve in the same fashion as their general recovery of the joint replacement. We discussed the term robot, when used to describe the Literably system, refers to the Literably robotic arm. The Literably System is not a robot, but a surgeon-controlled robotic-arm assisted device.    On the day of surgery the site of surgery was properly noted/marked if necessary per policy. The patient has been actively warmed in preoperative area. Preoperative antibiotics were confirmed and started prior to surgical incision. Venous thrombosis prophylaxis have been ordered including bilateral sequential compression devices to start in pre-operative area.     Procedure In Detail:  The patient was identified in the preoperative area .Their hip was then  marked by myself and the patient agreed to proceed with the outlined procedure.. They were transferred to the operating room and positioned supine on the OR table. Appropriate surgical huddle was performed with myself present. Anesthesia was then successfully induced. The patient was then positioned in the lateral decubitus position on a regular OR table utilizing padded hip positioner. All bony prominences were well-padded. An EKG lead was placed on the lateral condyle of the femur to serve as a constant landmark to assess leg length and offset with the robotic software during surgery. The operative lower extremity was then prepped and draped in the normal sterile fashion. A critical pause was taken and we identified the patient, the site of surgery, as well as the administration of preoperative IV antibiotics. The limb was then positioned neutral on a padded Carvajal stand and bony landmarks were identified on the skin.    We began the procedure by making 3 stab incisions just proximal to the iliac crest for placement of our raise for the Pastor procedure.  3 pins were placed utilizing the guide an array was fixated in contact with the iliac crest.  A posterior- superior hip incision was then performed with the #10 blade scalpel and a minimally invasive direct superior approach was made. The subcutaneous tissues were developed down to the level of the fascia. Electrocautery was used to achieve hemostasis. The gluteus alma fascia was then divided in line with the skin incision and the fibers of the gluteus alma were split bluntly to the level of the iliotibial band. The exposed pericapsular fat was removed with electrocautery.  A tracking point was placed on the lateral aspect of the greater trochanter.  The leg length and offset was then registered utilizing the Pastor.  The interval between the gluteus medius and the piriformis tendon was then developed. The conjoint tendon was isolated and released from its  insertion on the trochanter, tagged and retracted posteriorly, protecting the sciatic nerve through the remainder of the case. The gluteus minimus was elevated from the capsule and a capsulotomy was then performed. Intracapsular retractors were positioned around the femoral neck and the hip was dislocated posteriorly.  The dislocated femoral head was examined and noted to have eburnated bone with minimal cartilage on the weight bearing surface     Utilizing the digital ruler the neck cut that was planned preoperative was marked out.  Using a saw, a provisional femoral neck osteotomy was then performed at a level based on the preoperative template. The femoral head was excised.  The limb was then positioned neutral on a Carvajal stand and I proceeded with acetabular exposure. A Bassam-type retractor was placed over the anterior column and an inferior capsular retractor was positioned below the acetabular teardrop.  With the acetabulum exposed utilizing single ream technique the Reamer for corresponding shell was brought in and positioned utilizing the haptic guidance.  The Reamer was then removed and there was good bleeding bed of bone noted and a good hemispherical Reamer in appropriate position.  The corresponding size of the last reamer was used to select a  Trident II hemispherical shell. This was then opened and impacted in approximately 40 degrees of abduction and 20 degrees of anteversion utilizing the haptic guidance to the Pastor arm. The exact specifications for the abduction angle and anteversion were established preoperatively to best match patients anatomical needs in an effort to reduce the risk of post operative dislocation and psoas impingement on the implant. The polyethylene liner was then impacted into the shell.     The retractors were then position of the proximal femur and attention was drawn towards the femur. The femur was delivered into the wound and a box chisel was placed into the piriformis  fossa. A canal awl was placed down the canal without resistance. Using the Accolade Broach System, the femur was prepared to accept a broach with good fill and rotational stability. With this in place, there was good rotational and axial stability. The broach was noted to seat at the level of the calcar resection.  We distally had the same broach size and placement relative to the neck compared to our preoperative plan utilizing the "[employer] software.  No fractures were identified.     Multiple trial head and neck combinations were then made and the reduced hip was stable to 90 degrees of flexion, 70 degrees of internal rotation, and 20 degrees of adduction. The hip could be fully extended, externally rotated, and abducted without any anterior dislocation. The leg lengths were restored equally. Intraoperative radiographs were then obtained which demonstrated satisfactory positioning of the components. No fractures were identified.  Utilizing the robotic technology our leg length was checked and noted to be restored to the contralateral side and offset restored as well.  Satisfied with the reconstruction, the hip was dislocated and the femoral trials were removed. The final femoral stem and head were then impacted without complication. The hip was reduced one final time and all of the tissues were thoroughly irrigated.  Tracker was removed from the lateral aspect of the greater trochanter and pins for the array removed as well.  An anesthetic injection cocktail was infiltrated throughout the soft tissues. The capsule was repaired anatomically with #1 Vicryl suture. The conjoint tendon was approximated to the posterior edge of the trochanter with #1 Vicryl suture and the gluteal fascia was repaired with interrupted suture. The rest of the incision was closed in layers with a final layer of monocryl and skin glue. Occlusive dresssing was then applied. The drapes were then removed. The patient was then transferred to the  PACU in stable condition. All sponge and needed counts were correct at the end of the case.     Complications:  None; patient tolerated the procedure well.    Disposition: PACU - hemodynamically stable.  Condition: stable      Plan:                         Weight Bearing Status:  WBAT Bilateral LE                        Range of Motion: As tolerated                        Mike: none placed                        Dressing: Maintain for one week                        DVT Prophylaxis: xarelto 10mg for 4 weeks                        Antibiotics: Continue x24 hours mar-operatively and discharged home with 1 week oral doxycycline                        Discharge/Follow-up: Follow up in clinic in two weeks      Ramya Rendon DO  Attending Surgeon  Joint Replacement and Adult Reconstructive Surgery  [place], [place]      Attending Attestation: I was present and scrubbed for the entire procedure.    This note was dictated using speech recognition software and was not corrected for spelling or grammatical errors

## 2024-05-17 NOTE — CONSULTS
Inpatient consult to Medicine  Consult performed by: Amber Finch PA-C  Consult ordered by: Ramya Rendon DO  Reason for consult: COPD, asthma, psoriatic arthritis, h/o remote PE after R TKR in 2012          History and Physical    Chloe Mclean is a 44 y.o. female on day 0 of admission presenting with Primary osteoarthritis of left hip.  Room: 219    [facility]   44 year old female COPD, asthma, psoriatic arthritis, paranoid schizophrenia is s/p L THR.   In 2021 she sustained a fall and required ORIF left anterior column and wall acetabulum fracture which was c/b wound dehiscence requiring debridement x 2.   Hemodynamically stable.     PMHx:  Past Medical History:   Diagnosis Date    Anxiety and depression     h/o inpatient evaluation for SI in 2016    Asthma (HHS-HCC)     COPD (chronic obstructive pulmonary disease) (Multi)     Fungal infection of lung 07/12/2021    + Carri dubliniensis bronchial washing    H/O being hospitalized 07/2009    + Clostridium difficile toxin and salmonella diarrhea    History of venous thromboembolism     after surgery    Lung mass     6/2010 lung biopsy organizing pneumonia    Lupus (Multi)     Mucus plugging of bronchi 07/2021    Post-op ORIF acetabulum requiring increased O2 support. CXR found left lung collapse, requiring bronchoscopy in order to wash out and re-expand the lung    Osteoarthritis     PONV (postoperative nausea and vomiting)     Psoriatic arthritis (Multi)     Cannot be on biologic due to prior fungal pulmonary infection    Schizophrenia (Multi)     Seasonal allergies     Snores 12/2023    Home sleep study in 12/2023 did not show significant sleep apnea or oxygen desaturations. Mild snoring is noted.      Past Surgical Hx:  Past Surgical History:  No date: APPENDECTOMY  No date: BRONCHOSCOPY; Left      Comment:  A mucous plug was found in the left mainstem bronchus  No date: CHOLECYSTECTOMY  No date: HYSTERECTOMY  12/08/2008: KNEE ARTHROSCOPY W/  DEBRIDEMENT; Right      Comment:  of medial meniscus, the lateral meniscus; chondroplasty                of the lateral tibial condyle,the medial femoral condyle,               and large patellar plica  07/07/2021: ORIF PELVIC FRACTURE; Left      Comment:  ORIF left anterior Column and wall acetabulum fracture                c/b Wound dehiscence requiring debridement  09/15/2021: PELVIS DEBRIDEMENT; Left      Comment:  Irrigation and excisional debridement of skin,                subcutaneous tissue, fat,fascia, of postoperative                surgical wound infection [id number]:                Saucerization of left iliac crest/pelvis bone. Left hip                arthrocentesis. Drainage of left pelvic abscess.                Application of negative pressure wound VAC. Secondary                complex wound closure  10/14/2021: PELVIS DEBRIDEMENT; Left      Comment:  Irrigation and excisional debridement of pelvic abscess.               Wound closure of left pelvis. Debridement/saucerization                of iliac wing  05/17/2024: TOTAL HIP ARTHROPLASTY; Left  11/2012: TOTAL KNEE ARTHROPLASTY; Right    Social history:   reports that she has quit smoking. Her smoking use included cigarettes. She uses smokeless tobacco. She reports current drug use. Drug: Marijuana. She reports that she does not drink alcohol.    Family history:   Family History   Problem Relation Name Age of Onset    Anesthesia problems Mother          delayed emergence       Allergies   Allergen Reactions    Iodine Rash    Latex Rash       Home Medication:   Medication Details   acetaminophen (Tylenol) 500 mg tablet Take 2 tablets (1,000 mg) by mouth every 6 hours if needed for mild pain (1 - 3).   albuterol (ProAir HFA) 90 mcg/actuation inhaler Inhale 2 puffs every 4 hours if needed for wheezing.   ibuprofen 200 mg tablet Take 3 tablets (600 mg) by mouth every 6 hours if needed for mild pain (1 - 3).   NON FORMULARY Take 2 each by mouth  "once daily. Proestro (over the counter estrogen)   QUEtiapine (SEROquel) 400 mg tablet Take 1 tablet (400 mg) by mouth once daily at bedtime.   QUEtiapine (SEROquel) 50 mg tablet Take 0.5 tablets (25 mg) by mouth 3 times a day as needed.       Last Recorded Vitals  Blood pressure 126/73, pulse 69, temperature 36.1 °C (97 °F), temperature source Temporal, resp. rate 16, height 1.727 m (5' 8\"), weight 110 kg (241 lb 10 oz), SpO2 97%.    Physical Exam  General: Patient in no acute distress, lying in bed  HEENT: EOMI, moist mucous membranes, anicteric  Neck: No JVD, no cervical lymphadenopathy  Heart: RRR, no murmurs, rubs, or gallops  Lungs: Diffuse rhonchorous with mild wheezes, coughing does not clears symptoms  abdomen: Soft, nontender, nondistended, no organomegaly  Extremities: No peripheral edema, +2 radial and pedal pulses  Skin: No rash or cyanosis  Neurological: AOx3, non focal  Psychiatric: Cooperative and pleasant    Recent Results:   Latest Reference Range & Units 05/08/24 12:29   GLUCOSE 74 - 99 mg/dL 89   SODIUM 136 - 145 mmol/L 140   POTASSIUM 3.5 - 5.3 mmol/L 4.2   CHLORIDE 98 - 107 mmol/L 103   Bicarbonate 21 - 32 mmol/L 27   Anion Gap 10 - 20 mmol/L 14   Blood Urea Nitrogen 6 - 23 mg/dL 10   Creatinine 0.50 - 1.05 mg/dL 0.66   EGFR >60 mL/min/1.73m*2 >90   Calcium 8.6 - 10.3 mg/dL 9.5   Albumin 3.4 - 5.0 g/dL 4.4   Alkaline Phosphatase 33 - 110 U/L 105   ALT 7 - 45 U/L 8   AST 9 - 39 U/L 12   Bilirubin Total 0.0 - 1.2 mg/dL 0.5   Total Protein 6.4 - 8.2 g/dL 7.3   Vitamin B12 211 - 946 pg/mL 345   Hemoglobin A1C See below % 5.2   Estimated Average Glucose Not Established mg/dL 103   LEUKOCYTES (10*3/UL) IN BLOOD BY AUTOMATED COUNT, French 4.4 - 11.3 x10*3/uL 7.3   nRBC  COMMENT ONLY   ERYTHROCYTES (10*6/UL) IN BLOOD BY AUTOMATED COUNT, French 4.00 - 5.20 x10*6/uL 4.54   HEMOGLOBIN 12.0 - 16.0 g/dL 13.0   HEMATOCRIT 36.0 - 46.0 % 39.7   MCV 80 - 100 fL 87   MCH 26.0 - 34.0 pg 28.6   MCHC 32.0 - " 36.0 g/dL 32.7   RED CELL DISTRIBUTION WIDTH 11.5 - 14.5 % 12.8   PLATELETS (10*3/UL) IN BLOOD AUTOMATED COUNT, German 150 - 450 x10*3/uL 277       Assessment/Plan   1) status post left total hip replacement   Ortho management   PT eval/treat  Incentive spirometry  BM management  Supportive Care  Antibiotic prophylaxis per ortho  DVT prophylaxis per ortho  2) DVT prophylaxis   40 mg Lovenox subcu daily while inpatient (10mg Xarelto daily on discharge), SCDs ambulation  3) COPD/asthma   Patient only uses albuterol as needed.  Currently lungs are positive for diffuse rhonchi and wheezes.  Ordered DuoNebs every 6 hours while awake.  Albuterol as needed.  Acapella.  Respiratory therapy to monitor.  4) paranoid schizophrenia   Seroquel at bed time  5) history of C. difficile in 2009   Probiotic       I spent 45 minutes in the professional and overall care of this patient.      Amber Finch PA-C

## 2024-05-17 NOTE — CARE PLAN
Problem: Balance  Goal: LTG - Patient will maintain standing and sitting balance to allow for completion of daily activities  Outcome: Progressing     Problem: Mobility  Goal: LTG - Patient will recall and demonstrate 3 out of 3 total hip precautions during mobility  Outcome: Progressing  Goal: LTG - Patient will ambulate community distance with rolling walker at distant supervision   Outcome: Progressing  Goal: LTG - Patient will navigate 7+7 steps with rails/device at close supervision  Outcome: Progressing     Problem: Safety  Goal: LTG - Patient will adhere to hip precautions during ADL's and transfers  Outcome: Progressing     Problem: PT Transfers  Goal: LTG - Patient will demonstrate safe transfer techniques with rolling walker at distant supervision   Outcome: Progressing     Problem: Pain  Goal: LTG - Patient will manage pain with the appropriate technique/Intervention  Outcome: Progressing     Problem: Compromised Skin Integrity  Goal: LTG - Patient will be free from infection  Outcome: Progressing

## 2024-05-17 NOTE — PROGRESS NOTES
05/17/24 1519   Discharge Planning   Living Arrangements Spouse/significant other   Support Systems Spouse/significant other   Assistance Needed HOME with 24/7 supervision   Type of Residence Private residence   Number of Stairs to Enter Residence 1   Number of Stairs Within Residence 14   Do you have animals or pets at home? No   Who is requesting discharge planning? Provider   Home or Post Acute Services In home services   Type of Home Care Services Home PT   Patient expects to be discharged to: HOME with [facility]   Does the patient need discharge transport arranged? No   Financial Resource Strain   How hard is it for you to pay for the very basics like food, housing, medical care, and heating? Not hard   Housing Stability   In the last 12 months, was there a time when you were not able to pay the mortgage or rent on time? N   In the last 12 months, how many places have you lived? 1   In the last 12 months, was there a time when you did not have a steady place to sleep or slept in a shelter (including now)? N   Transportation Needs   In the past 12 months, has lack of transportation kept you from medical appointments or from getting medications? no   In the past 12 months, has lack of transportation kept you from meetings, work, or from getting things needed for daily living? No   Patient Choice   Provider Choice list and CMS website (https://medicare.gov/care-compare#search) for post-acute Quality and Resource Measure Data were provided and reviewed with: Patient     RN TCC to confirm referral and SOC on 5/18

## 2024-05-17 NOTE — ANESTHESIA PROCEDURE NOTES
Spinal Block    Patient location during procedure: OR  Start time: 5/17/2024 11:54 AM  End time: 5/17/2024 11:59 AM  Reason for block: primary anesthetic  Staffing  Performed: MARILY   Authorized by: Brooks Danielle DO    Performed by: MARILY Linda    Preanesthetic Checklist  Completed: patient identified, IV checked, risks and benefits discussed, surgical consent, monitors and equipment checked, pre-op evaluation, timeout performed and sterile techniques followed  Block Timeout  RN/Licensed healthcare professional reads aloud to the Anesthesia provider and entire team: Patient identity, procedure with side and site, patient position, and as applicable the availability of implants/special equipment/special requirements.    Timeout performed at: 5/17/2024 11:54 AM  Spinal Block  Patient position: sitting  Prep: ChloraPrep  Sterility prep: cap, drape, gloves, hand hygiene and mask  Sedation level: light sedation  Patient monitoring: blood pressure, continuous pulse oximetry and heart rate  Approach: midline  Vertebral space: L3-4  Injection technique: single-shot  Needle  Needle type: pencil-point   Needle gauge: 25 G  Needle length: 3.5 in  Free flowing CSF: yes    Assessment  Sensory level: T10 bilateral  Procedure assessment: patient sedated but conversant throughout procedure and patient tolerated procedure well with no immediate complications  Additional Notes  1.7mL 0.75% marcaine in dextrose  Spinal kit exp: 05-  Lot# [id number]

## 2024-05-17 NOTE — ANESTHESIA PREPROCEDURE EVALUATION
Patient: Chloe Mclean    Procedure Information       Date/Time: 05/17/24 1200    Procedure: Total Hip Arthroplasty, SCHEDULE: 2 HOURS, Extended recovery ( [place], SOULEYMANE, synthes screw removal tray availlable ) *iodine and latex allergy* (Left: Hip) - SCHEDULE: 2 HOURS, Extended recovery    Location: [facility] / [facility]    Surgeons: Ramya Rendon, DO            Relevant Problems   Pulmonary   (+) Exertional shortness of breath      Neuro   (+) Bipolar depression (Multi)      Hematology   (+) Anemia      Musculoskeletal   (+) Lupus (Multi)   (+) Primary osteoarthritis of left hip      ID   (+) MSSA (methicillin susceptible Staphylococcus aureus) infection   (+) Osteomyelitis of pelvis (Multi)   (+) Pelvic abscess in female   (+) Wound infection after surgery       Clinical information reviewed:   Tobacco  Allergies  Meds   Med Hx  Surg Hx  OB Status  Fam Hx  Soc   Hx        NPO Detail:  NPO/Void Status  Date of Last Liquid: 05/16/24  Time of Last Liquid: 2230  Date of Last Solid: 05/16/24  Time of Last Solid: 2230  Last Intake Type: Clear fluids  Time of Last Void: 0530         Physical Exam    Airway  Mallampati: II     Cardiovascular   Rhythm: regular  Rate: normal     Dental - normal exam       Pulmonary   Breath sounds clear to auscultation     Abdominal   Abdomen: soft             Anesthesia Plan    History of general anesthesia?: yes  History of complications of general anesthesia?: no    ASA 3     general     The patient is a current smoker.    intravenous induction   Postoperative administration of opioids is intended.  Anesthetic plan and risks discussed with patient.    Plan discussed with CRNA, attending and CAA.

## 2024-05-17 NOTE — NURSING NOTE
Admitted to room 219 from surgery following left hip surgery. Oriented to room. Call light given.Family at bedside. Complains of slight nausea. No emesis at this time.Dressing intact with no noted drainage.

## 2024-05-17 NOTE — HH CARE COORDINATION
Home Care received a Referral for Physical Therapy and Occupational Therapy. We have processed the referral for a Start of Care on 5.18.24.     If you have any questions or concerns, please feel free to contact us at [phone]. Follow the prompts, enter your five digit zip code, and you will be directed to your care team on [facility].

## 2024-05-17 NOTE — NURSING NOTE
Patient arrived from PACU  Patient's vitals were done   Dinner was encouraged   Call light within reach

## 2024-05-17 NOTE — PERIOPERATIVE NURSING NOTE
Pt did well in recovery. Pt tolerates po well. Pain is tolerable at this time. Pt is ready for floor transfer. Report called to rn.

## 2024-05-17 NOTE — ANESTHESIA POSTPROCEDURE EVALUATION
Patient: Chloe Mclean    Procedure Summary       Date: 05/17/24 Room / Location: [facility] / [facility]    Anesthesia Start: 1152 Anesthesia Stop: 1411    Procedure: Total Hip Arthroplasty, SCHEDULE: 2 HOURS, Extended recovery ( [place], SOULEYMANE, synthes screw removal tray availlable ) *iodine and latex allergy* (Left: Hip) Diagnosis:       Primary osteoarthritis of left hip      (Primary osteoarthritis of left hip [M16.12])    Surgeons: Ramya Rendon DO Responsible Provider: MARILY Linda    Anesthesia Type: general ASA Status: 3            Anesthesia Type: general    Vitals Value Taken Time   /87 05/17/24 1433   Temp 36.3 °C (97.3 °F) 05/17/24 1406   Pulse 75 05/17/24 1433   Resp 16 05/17/24 1433   SpO2 98 % 05/17/24 1406       Anesthesia Post Evaluation    Patient location during evaluation: PACU  Patient participation: complete - patient participated  Level of consciousness: awake  Pain management: adequate  Airway patency: patent  Cardiovascular status: acceptable  Respiratory status: acceptable  Hydration status: acceptable  Postoperative Nausea and Vomiting: none        There were no known notable events for this encounter.

## 2024-05-17 NOTE — PROGRESS NOTES
Physical Therapy Evaluation & Treatment    Patient Name: Chloe Mclean  MRN: [id number]  Today's Date: 5/17/2024   Time Calculation  Start Time: 1628  Stop Time: 1653  Time Calculation (min): 25 min    Assessment/Plan   PT Assessment  PT Assessment Results: Decreased strength, Decreased range of motion, Decreased endurance, Decreased mobility, Orthopedic restrictions, Pain  Rehab Prognosis: Good  Evaluation/Treatment Tolerance: Patient limited by fatigue, Treatment limited secondary to medical complications (Comment) (reports of nausea)  Strengths: Ability to acquire knowledge, Insight into problems, Rehab experience, Support and attitude of living partners  Barriers to Participation: Comorbidities  End of Session Communication: Bedside nurse  Assessment Comment: Pt mod complexity eval with evolving status presenting with lethargy, nausea, increased pain, and deficits to functional mobility following L [place] performed on 5/17/2024. Education regarding posterior hip precautions provided with handout issued; pt verbalized understanding. Therapeutic exercises performed; HEP handout provided. Pt reporting consistent nausea throughout session with no vomiting. Politely declined OOB activity this date d/t this. RN notified. Pt has prior rehab experience and will have support of s/o at home upon DC. PT will reassess functional status next session.     End of Session Patient Position: Bed, 3 rail up, Alarm on with B SCD's donned and ice to surgical site. Ginger ale and saltine crackers provided to pt. Call light left in reach; patient instructed not to get up on own and verbalized understanding of this. RN aware. RT present with pt at time of PT exit.     IP OR SWING BED PT PLAN  Inpatient or Swing Bed: Inpatient  PT Plan  Treatment/Interventions: Bed mobility, Transfer training, Gait training, Stair training, Strengthening, Endurance training, Range of motion, Therapeutic exercise, Therapeutic activity, Home exercise  program, Positioning  PT Plan: Skilled PT  PT Frequency: BID  PT Discharge Recommendations: Low intensity level of continued care  Equipment Recommended upon Discharge: Wheeled walker  PT Recommended Transfer Status: Assist x1, Assistive device      Subjective     General Visit Information:  General  Reason for Referral: L AUG[place]O (5/17/2024)  Referred By: Dr. Rendon  Past Medical History Relevant to Rehab: OA, posttraumatic arthritis, DVT, PE, schizophrenia, anxiety, depression, COPD, asthma, anemia, lupus, PONV; ORIF L acetabulum, R TKA  Family/Caregiver Present: No  Prior to Session Communication: Bedside nurse  Patient Position Received: Bed, 3 rail up, Alarm on  General Comment: Session cleared by RN. Pt lethargic but pleasant and agreeable to PT evaluation. Dressing to L hip dry & intact.     Home Living:  Home Living  Type of Home: House  Lives With: Significant other  Home Adaptive Equipment: Walker rolling or standard  Home Layout: Two level, Stairs to alternate level with rails  Alternate Level Stairs-Rails: Both  Alternate Level Stairs-Number of Steps: 7+7  Home Access: Level entry  Bathroom Shower/Tub: Tub/shower unit  Home Living Comments: Pt reports her boyfriend will be able to assist her with dressing and functional mobility upon DC.  Prior Level of Function:  Prior Function Per Pt/Caregiver Report  Level of [place]: Independent with ADLs and functional transfers, Independent with homemaking with ambulation  ADL Assistance: Independent  Homemaking Assistance: Independent  Ambulatory Assistance: Independent  Vocational: On disability  Prior Function Comments: Pt denies falls prior to admission  Precautions:  Precautions  LE Weight Bearing Status: Weight Bearing as Tolerated  Medical Precautions: Fall precautions  Post-Surgical Precautions: Left hip precautions (posterior approach)  Vital Signs:  Vital Signs  Heart Rate: 66  Heart Rate Source: Monitor  SpO2: 98 %  Patient Position: Lying    Objective    Pain:  Pain Assessment  Pain Assessment: 0-10  Pain Score: 4  Pain Type: Surgical pain  Pain Location: Hip  Pain Orientation: Left  Pain Interventions: Cold applied  Cognition:  Cognition  Overall Cognitive Status: Within Functional Limits  Attention: Within Functional Limits  Memory: Within Funtional Limits  Problem Solving: Within Functional Limits  Safety/Judgement: Within Functional Limits  Insight: Within function limits  Impulsive: Within functional limits    General Assessments:  Activity Tolerance  Endurance: Tolerates less than 10 min exercise, no significant change in vital signs  Activity Tolerance Comments: pt limited this session by reports of nausea and fatigue    Sensation  Light Touch: No apparent deficits  Proprioception: No apparent deficits    Coordination  Movements are Fluid and Coordinated: Yes  Extremity/Trunk Assessments:  RUE   RUE : Within Functional Limits  LUE   LUE: Within Functional Limits  RLE   RLE : Within Functional Limits  LLE   LLE : Exceptions to WFL  AROM LLE (degrees)  LLE AROM Comment: L hip flexion to 90 degrees within precautions  Strength LLE  LLE Overall Strength: Greater than or equal to 3/5 as evidenced by functional mobility  Treatments:  Therapeutic Exercise  Therapeutic Exercise Performed: Yes B ankle pumps, L quad sets, L gluteal sets, L heel slides, L SAQ, and L hip abduction x 10 reps each.    Outcome Measures:  [facility] Basic Mobility  Turning from your back to your side while in a flat bed without using bedrails: None  Moving from lying on your back to sitting on the side of a flat bed without using bedrails: None  Moving to and from bed to chair (including a wheelchair): A little  Standing up from a chair using your arms (e.g. wheelchair or bedside chair): A little  To walk in hospital room: A little  Climbing 3-5 steps with railing: A little  Basic Mobility - Total Score: 20    Encounter Problems       Encounter Problems (Active)       Balance       LTG -  Patient will maintain standing and sitting balance to allow for completion of daily activities (Progressing)       Start:  05/17/24               Compromised Skin Integrity       LTG - Patient will be free from infection (Progressing)       Start:  05/17/24               Mobility       LTG - Patient will recall and demonstrate 3 out of 3 total hip precautions during mobility (Progressing)       Start:  05/17/24            LTG - Patient will ambulate community distance with rolling walker at distant supervision  (Progressing)       Start:  05/17/24            LTG - Patient will navigate 7+7 steps with rails/device at close supervision (Progressing)       Start:  05/17/24               PT Transfers       LTG - Patient will demonstrate safe transfer techniques with rolling walker at distant supervision  (Progressing)       Start:  05/17/24               Pain          Pain - Adult          Safety       LTG - Patient will adhere to hip precautions during ADL's and transfers (Progressing)       Start:  05/17/24                   Education Documentation  Handouts, taught by Anh Owusu PT at 5/17/2024  5:08 PM.  Learner: Patient  Readiness: Acceptance  Method: Explanation, Demonstration, Handout  Response: Verbalizes Understanding, Demonstrated Understanding, Needs Reinforcement    Precautions, taught by Anh Owusu PT at 5/17/2024  5:08 PM.  Learner: Patient  Readiness: Acceptance  Method: Explanation, Demonstration, Handout  Response: Verbalizes Understanding, Demonstrated Understanding, Needs Reinforcement    Body Mechanics, taught by Anh Owusu PT at 5/17/2024  5:08 PM.  Learner: Patient  Readiness: Acceptance  Method: Explanation, Demonstration, Handout  Response: Verbalizes Understanding, Demonstrated Understanding, Needs Reinforcement    Home Exercise Program, taught by Anh Owusu PT at 5/17/2024  5:08 PM.  Learner: Patient  Readiness: Acceptance  Method: Explanation, Demonstration,  Handout  Response: Verbalizes Understanding, Demonstrated Understanding, Needs Reinforcement    Mobility Training, taught by Anh Owusu PT at 5/17/2024  5:08 PM.  Learner: Patient  Readiness: Acceptance  Method: Explanation, Demonstration, Handout  Response: Verbalizes Understanding, Demonstrated Understanding, Needs Reinforcement            Anh Owusu PT, DPT

## 2024-05-18 VITALS
WEIGHT: 241.62 LBS | HEIGHT: 68 IN | OXYGEN SATURATION: 98 % | SYSTOLIC BLOOD PRESSURE: 106 MMHG | HEART RATE: 96 BPM | BODY MASS INDEX: 36.62 KG/M2 | DIASTOLIC BLOOD PRESSURE: 56 MMHG | RESPIRATION RATE: 18 BRPM | TEMPERATURE: 97.3 F

## 2024-05-18 PROBLEM — M16.12 PRIMARY OSTEOARTHRITIS OF LEFT HIP: Status: RESOLVED | Noted: 2024-04-01 | Resolved: 2024-05-18

## 2024-05-18 LAB
ANION GAP SERPL CALC-SCNC: 15 MMOL/L (ref 10–20)
BASOPHILS # BLD AUTO: 0.01 X10*3/UL (ref 0–0.1)
BASOPHILS NFR BLD AUTO: 0.1 %
BUN SERPL-MCNC: 10 MG/DL (ref 6–23)
CALCIUM SERPL-MCNC: 8.8 MG/DL (ref 8.6–10.3)
CHLORIDE SERPL-SCNC: 105 MMOL/L (ref 98–107)
CO2 SERPL-SCNC: 21 MMOL/L (ref 21–32)
CREAT SERPL-MCNC: 0.57 MG/DL (ref 0.5–1.05)
EGFRCR SERPLBLD CKD-EPI 2021: >90 ML/MIN/1.73M*2
EOSINOPHIL # BLD AUTO: 0.01 X10*3/UL (ref 0–0.7)
EOSINOPHIL NFR BLD AUTO: 0.1 %
ERYTHROCYTE [DISTWIDTH] IN BLOOD BY AUTOMATED COUNT: 12.5 % (ref 11.5–14.5)
GLUCOSE SERPL-MCNC: 145 MG/DL (ref 74–99)
HCT VFR BLD AUTO: 30.7 % (ref 36–46)
HGB BLD-MCNC: 10.1 G/DL (ref 12–16)
IMM GRANULOCYTES # BLD AUTO: 0.01 X10*3/UL (ref 0–0.7)
IMM GRANULOCYTES NFR BLD AUTO: 0.1 % (ref 0–0.9)
LYMPHOCYTES # BLD AUTO: 1.5 X10*3/UL (ref 1.2–4.8)
LYMPHOCYTES NFR BLD AUTO: 14.8 %
MCH RBC QN AUTO: 28.9 PG (ref 26–34)
MCHC RBC AUTO-ENTMCNC: 32.9 G/DL (ref 32–36)
MCV RBC AUTO: 88 FL (ref 80–100)
MONOCYTES # BLD AUTO: 0.41 X10*3/UL (ref 0.1–1)
MONOCYTES NFR BLD AUTO: 4 %
NEUTROPHILS # BLD AUTO: 8.2 X10*3/UL (ref 1.2–7.7)
NEUTROPHILS NFR BLD AUTO: 80.9 %
NRBC BLD-RTO: ABNORMAL /100{WBCS}
PLATELET # BLD AUTO: 208 X10*3/UL (ref 150–450)
POTASSIUM SERPL-SCNC: 3.6 MMOL/L (ref 3.5–5.3)
RBC # BLD AUTO: 3.5 X10*6/UL (ref 4–5.2)
SODIUM SERPL-SCNC: 137 MMOL/L (ref 136–145)
WBC # BLD AUTO: 10.1 X10*3/UL (ref 4.4–11.3)

## 2024-05-18 PROCEDURE — 2500000002 HC RX 250 W HCPCS SELF ADMINISTERED DRUGS (ALT 637 FOR MEDICARE OP, ALT 636 FOR OP/ED): Mod: MUE | Performed by: PHYSICIAN ASSISTANT

## 2024-05-18 PROCEDURE — 94762 N-INVAS EAR/PLS OXIMTRY CONT: CPT

## 2024-05-18 PROCEDURE — 2500000004 HC RX 250 GENERAL PHARMACY W/ HCPCS (ALT 636 FOR OP/ED): Mod: JZ | Performed by: ORTHOPAEDIC SURGERY

## 2024-05-18 PROCEDURE — 85025 COMPLETE CBC W/AUTO DIFF WBC: CPT | Performed by: STUDENT IN AN ORGANIZED HEALTH CARE EDUCATION/TRAINING PROGRAM

## 2024-05-18 PROCEDURE — 2500000001 HC RX 250 WO HCPCS SELF ADMINISTERED DRUGS (ALT 637 FOR MEDICARE OP)

## 2024-05-18 PROCEDURE — 96372 THER/PROPH/DIAG INJ SC/IM: CPT | Performed by: ORTHOPAEDIC SURGERY

## 2024-05-18 PROCEDURE — 36415 COLL VENOUS BLD VENIPUNCTURE: CPT | Performed by: STUDENT IN AN ORGANIZED HEALTH CARE EDUCATION/TRAINING PROGRAM

## 2024-05-18 PROCEDURE — 2500000001 HC RX 250 WO HCPCS SELF ADMINISTERED DRUGS (ALT 637 FOR MEDICARE OP): Performed by: ORTHOPAEDIC SURGERY

## 2024-05-18 PROCEDURE — 80048 BASIC METABOLIC PNL TOTAL CA: CPT | Performed by: STUDENT IN AN ORGANIZED HEALTH CARE EDUCATION/TRAINING PROGRAM

## 2024-05-18 PROCEDURE — 97116 GAIT TRAINING THERAPY: CPT | Mod: GP

## 2024-05-18 PROCEDURE — 94640 AIRWAY INHALATION TREATMENT: CPT

## 2024-05-18 PROCEDURE — 97110 THERAPEUTIC EXERCISES: CPT | Mod: GP

## 2024-05-18 PROCEDURE — 7100000011 HC EXTENDED STAY RECOVERY HOURLY - NURSING UNIT

## 2024-05-18 RX ORDER — QUETIAPINE FUMARATE 25 MG/1
25 TABLET, FILM COATED ORAL 3 TIMES DAILY PRN
Status: DISCONTINUED | OUTPATIENT
Start: 2024-05-18 | End: 2024-05-18 | Stop reason: HOSPADM

## 2024-05-18 RX ADMIN — ACETAMINOPHEN 650 MG: 325 TABLET ORAL at 13:04

## 2024-05-18 RX ADMIN — SENNOSIDES 17.2 MG: 8.6 TABLET, FILM COATED ORAL at 09:24

## 2024-05-18 RX ADMIN — PANTOPRAZOLE SODIUM 40 MG: 40 TABLET, DELAYED RELEASE ORAL at 06:18

## 2024-05-18 RX ADMIN — OXYCODONE 5 MG: 5 TABLET ORAL at 04:14

## 2024-05-18 RX ADMIN — CEFAZOLIN SODIUM 2 G: 2 INJECTION, SOLUTION INTRAVENOUS at 04:13

## 2024-05-18 RX ADMIN — ACETAMINOPHEN 650 MG: 325 TABLET ORAL at 00:39

## 2024-05-18 RX ADMIN — IPRATROPIUM BROMIDE AND ALBUTEROL SULFATE 3 ML: 2.5; .5 SOLUTION RESPIRATORY (INHALATION) at 08:00

## 2024-05-18 RX ADMIN — OXYCODONE 10 MG: 5 TABLET ORAL at 14:06

## 2024-05-18 RX ADMIN — ACETAMINOPHEN 650 MG: 325 TABLET ORAL at 06:18

## 2024-05-18 RX ADMIN — ENOXAPARIN SODIUM 40 MG: 40 INJECTION SUBCUTANEOUS at 09:23

## 2024-05-18 RX ADMIN — OXYCODONE 10 MG: 5 TABLET ORAL at 09:23

## 2024-05-18 RX ADMIN — Medication 1 TABLET: at 09:24

## 2024-05-18 ASSESSMENT — COGNITIVE AND FUNCTIONAL STATUS - GENERAL
WALKING IN HOSPITAL ROOM: A LITTLE
TOILETING: A LITTLE
CLIMB 3 TO 5 STEPS WITH RAILING: A LITTLE
MOBILITY SCORE: 21
MOBILITY SCORE: 20
MOVING TO AND FROM BED TO CHAIR: A LITTLE
TURNING FROM BACK TO SIDE WHILE IN FLAT BAD: A LITTLE
DAILY ACTIVITIY SCORE: 21
STANDING UP FROM CHAIR USING ARMS: A LITTLE
CLIMB 3 TO 5 STEPS WITH RAILING: A LITTLE
DRESSING REGULAR LOWER BODY CLOTHING: A LITTLE
WALKING IN HOSPITAL ROOM: A LITTLE
HELP NEEDED FOR BATHING: A LITTLE

## 2024-05-18 ASSESSMENT — PAIN - FUNCTIONAL ASSESSMENT
PAIN_FUNCTIONAL_ASSESSMENT: 0-10

## 2024-05-18 ASSESSMENT — PAIN DESCRIPTION - ORIENTATION
ORIENTATION: LEFT
ORIENTATION: LEFT

## 2024-05-18 ASSESSMENT — PAIN SCALES - GENERAL
PAINLEVEL_OUTOF10: 6
PAINLEVEL_OUTOF10: 3
PAINLEVEL_OUTOF10: 3
PAINLEVEL_OUTOF10: 0 - NO PAIN
PAINLEVEL_OUTOF10: 7
PAINLEVEL_OUTOF10: 3
PAINLEVEL_OUTOF10: 7

## 2024-05-18 ASSESSMENT — PAIN DESCRIPTION - LOCATION
LOCATION: HIP
LOCATION: HIP

## 2024-05-18 ASSESSMENT — PAIN SCALES - WONG BAKER: WONGBAKER_NUMERICALRESPONSE: HURTS LITTLE BIT

## 2024-05-18 ASSESSMENT — PAIN DESCRIPTION - DESCRIPTORS
DESCRIPTORS: ACHING
DESCRIPTORS: ACHING

## 2024-05-18 ASSESSMENT — PAIN SCALES - PAIN ASSESSMENT IN ADVANCED DEMENTIA (PAINAD): TOTALSCORE: MEDICATION (SEE MAR)

## 2024-05-18 NOTE — PROGRESS NOTES
Physical Therapy    Physical Therapy Treatment    Patient Name: Chloe Mclean  MRN: [id number]  Today's Date: 5/18/2024  Time Calculation  Start Time: 1229  Stop Time: 1304  Time Calculation (min): 35 min    Assessment/Plan   PT Assessment  PT Assessment Results: Decreased strength, Decreased range of motion, Decreased endurance, Decreased mobility, Orthopedic restrictions, Pain  Rehab Prognosis: Good  Evaluation/Treatment Tolerance: Patient tolerated treatment well  End of Session Communication: Bedside nurse  Assessment Comment: Reviewed Posterior hip precautions and HEP. Pt. demo safe transfers with appropriate technique. Pt. safely performed stair navigation with correct technique. Pt. demo decreased endurance with prolonged ambulation but able to perform household ambulation distances. Recommend pt. discharge to Home PT with 24/7 supervision when appropriate.  End of Session Patient Position: Bed, 3 rail up, Alarm on (Needs within reach, Ice on L hip. Nurse in room to provide medication.)  PT Plan  Inpatient/Swing Bed or Outpatient: Inpatient  PT Plan  Treatment/Interventions: Bed mobility, Transfer training, Stair training, Gait training, Balance training, Neuromuscular re-education, Strengthening, Endurance training, Range of motion, Therapeutic exercise, Therapeutic activity, Home exercise program, Positioning  PT Plan: Skilled PT  PT Frequency: BID  PT Discharge Recommendations: Low intensity level of continued care  Equipment Recommended upon Discharge: Wheeled walker  PT Recommended Transfer Status: Contact guard  PT - OK to Discharge: Yes (To home PT with 24/7 supervision)      General Visit Information:   PT  Visit  PT Received On: 05/18/24  Response to Previous Treatment: Patient with no complaints from previous session.  General  Reason for Referral: L [place]UG[place]O (5/17/2024)  Referred By: Dr. Rendon  Past Medical History Relevant to Rehab: OA, posttraumatic arthritis, DVT, PE, schizophrenia, anxiety,  depression, COPD, asthma, anemia, lupus, PONV; ORIF L acetabulum, R TKA  Family/Caregiver Present: No  Prior to Session Communication: Bedside nurse  Patient Position Received: Bed, 3 rail up, Alarm on  General Comment: Dressing dry and intact.    Subjective   Precautions:  Precautions  LE Weight Bearing Status: Weight Bearing as Tolerated  Medical Precautions: Fall precautions  Post-Surgical Precautions: Left hip precautions    Objective   Pain:  Pain Assessment  Pain Assessment: 0-10  Pain Score: 3  Pain Type: Surgical pain  Pain Location: Hip  Pain Orientation: Left  Pain Interventions: Cold applied, Repositioned, Ambulation/increased activity (Nurse informed of increasing Left hip pain at end of session from ambulation and transfers)    Postural Control:  Static Sitting Balance  Static Sitting-Balance Support: Bilateral upper extremity supported, Feet supported  Static Sitting-Level of Assistance: Contact guard  Dynamic Sitting Balance  Dynamic Sitting-Balance Support: Bilateral upper extremity supported, Feet supported  Dynamic Sitting-Comments: Contact guard assist  Static Standing Balance  Static Standing-Balance Support: Bilateral upper extremity supported  Static Standing-Level of Assistance: Contact guard  Dynamic Standing Balance  Dynamic Standing-Balance Support: Bilateral upper extremity supported  Dynamic Standing-Comments: Contact guard assist    Activity Tolerance:  Activity Tolerance  Endurance: Endurance does not limit participation in activity  Treatments:  Therapeutic Exercise  Therapeutic Exercise Performed: Yes  Therapeutic Exercise Activity 1: Ankles pumps 1x10, glute squeezes 1x10 L Quad set 1x10, L heel slides 1x10, L SAQ 1x10, L hip abduction 1x10.      Therapeutic Activity  Therapeutic Activity Performed: Yes  Therapeutic Activity 1: Assisted patient with tolietting and hand hygiene CGA with min verbal cues. Assisted patient with [facility] gown and LE dressing. Pt. able to dress UE  without assistance.    Bed Mobility  Bed Mobility: Yes  Bed Mobility 1  Bed Mobility 1: Supine to sitting, Sitting to supine, Scooting  Level of Assistance 1: Contact guard, Minimal verbal cues  Bed Mobility Comments 1: Pt. returned to bed due to L hip discomfort in chair    Ambulation/Gait Training  Ambulation/Gait Training Performed: Yes  Ambulation/Gait Training 1  Surface 1: Level tile  Device 1: Rolling walker  Gait Support Devices: Gait belt  Assistance 1: Contact guard, Minimal verbal cues  Quality of Gait 1: Diminished heel strike, Decreased step length, Antalgic (Increased reliance on UE. Min cueing for L hip position when turning to the left to maintain posterior hip precautions)  Comments/Distance (ft) 1: 150 ft x2  Transfers  Transfer: Yes  Transfer 1  Transfer From 1: Bed to  Transfer to 1: Stand, Toilet, Chair with arms, Bed  Technique 1: Sit to stand, Stand to sit  Transfer Device 1: Walker  Transfer Level of Assistance 1: Contact guard, Minimal verbal cues  Trials/Comments 1: Pt. performed transfers with no difficulty. Min cueing for hand placement. Ended session with patient in bed due to L hip discomfort in chair.    Stairs  Stairs: Yes  Stairs  Rails 1: Bilateral  Device 1: Railing  Support Devices 1: Gait belt  Assistance 1: Contact guard (Education on gait sequence with step to pattern leading with R LE.)  Comment/Number of Steps 1: 3 steps  x2    Outcome Measures:  [facility] Basic Mobility  Turning from your back to your side while in a flat bed without using bedrails: None  Moving from lying on your back to sitting on the side of a flat bed without using bedrails: A little  Moving to and from bed to chair (including a wheelchair): None  Standing up from a chair using your arms (e.g. wheelchair or bedside chair): None  To walk in hospital room: A little  Climbing 3-5 steps with railing: A little  Basic Mobility - Total Score: 21    Education Documentation  No documentation found.  Education  Comments  No comments found.        OP EDUCATION:       Encounter Problems       Encounter Problems (Active)       Balance       LTG - Patient will maintain standing and sitting balance to allow for completion of daily activities (Progressing)       Start:  05/17/24               Compromised Skin Integrity       LTG - Patient will be free from infection (Progressing)       Start:  05/17/24               Mobility       LTG - Patient will recall and demonstrate 3 out of 3 total hip precautions during mobility (Progressing)       Start:  05/17/24            LTG - Patient will ambulate community distance with rolling walker at distant supervision  (Progressing)       Start:  05/17/24            LTG - Patient will navigate 7+7 steps with rails/device at close supervision (Progressing)       Start:  05/17/24               PT Transfers       LTG - Patient will demonstrate safe transfer techniques with rolling walker at distant supervision  (Progressing)       Start:  05/17/24               Pain          Pain - Adult          Safety       LTG - Patient will adhere to hip precautions during ADL's and transfers (Progressing)       Start:  05/17/24                 Yadiel Garcia, PT, DPT

## 2024-05-18 NOTE — PROGRESS NOTES
Patient seen, chart reviewed.  Currently receiving a breathing treatment.  Denies any shortness of breath.  Vital signs are stable.  Pain is well-controlled.  No acute complaints.  Nursing notes no issues.       Candi Hamilton MD

## 2024-05-18 NOTE — NURSING NOTE
Assumed care of patient. Report received from LEWIS Myers. Patient resting in bed watching TV. Vital signs stable, no acute distress. Patient denies pain at this time. Surgical dressing to left hip is clean, dry and intact with ice man cooler in place. No stated needs at this time. Bed is in lowest locked position with bed alarm activated and call light within reach. Will continue to monitor.

## 2024-05-18 NOTE — NURSING NOTE
Patient resting in bed. Morning labs drawn and sent to the lab. Vital signs stable. No stated needs. Call button is within reach and bed alarm is activated.

## 2024-05-18 NOTE — NURSING NOTE
Patient resting in bed with eyes closed. Respirations even and unlabored. Call button within reach.

## 2024-05-18 NOTE — DISCHARGE SUMMARY
Discharge Diagnosis  Left Total Hip Replacement    Issues Requiring Follow-Up  Home care services to start within 48 hours. Outpatient PT to start per surgeon instructions.    Test Results Pending At Discharge  Pending Labs       No current pending labs.            Hospital Course  Patient underwent surgery for Left Total Hip Replacement without complications. Patient was then takent to the PACU in stabe condition. Patient was then transferred to the [facility].  Pain was appropriately controlled and weaned to oral medications. Diet was advanced as tolerated. PT/OT was consulted to begin on post operative day 0 and recommended Home for patient on discharge. Patient had uneventful hospital course. Patient is to follow-up in 6 weeks at scheduled post-op visit.      Face to Face following surgical procedure on 05/18/24. The patient was awake and alert. VSS, afebrile. Sensation intact bilaterally, sural/saph/sp/tibal n. Motor intact flexion/extension/DF/PF/EHL/FHL bilaterally. Palpable symmetric DP/PT pulse bilaterally.    Pertinent Physical Exam At Time of Discharge  Physical Exam  Vitals and nursing note reviewed. Exam conducted with a chaperone present.   Constitutional:       Appearance: Normal appearance.   HENT:      Head: Normocephalic and atraumatic.   Eyes:      Extraocular Movements: Extraocular movements intact.   Cardiovascular:      Rate and Rhythm: Normal rate and regular rhythm.      Pulses: Normal pulses.      Heart sounds: Normal heart sounds.   Pulmonary:      Effort: Pulmonary effort is normal.   Abdominal:      Palpations: Abdomen is soft.   Musculoskeletal:      GENERAL: A/Ox3, NAD. Appears healthy, well nourished  CARDIAC: regular rate  LUNGS: Breathing non-labored    MUSCULOSKELETAL:  Laterality:  left hip     - Incision: dressing in place with no saturation  - Palpation: appropriate post operative TTP along surgical incision  - Can actively straight leg raise  - compartments soft, negative  homans    NEUROVASCULAR:  - Neurovascular Status: sensation intact to light touch distally  - Capillary refill brisk at extremities, Bilateral dorsalis pedis pulse 2+     Skin:     General: Skin is warm and dry.      Capillary Refill: Capillary refill takes less than 2 seconds.   Neurological:      General: No focal deficit present.      Mental Status: Patient is alert and oriented to person, place, and time. Mental status is at baseline.   Psychiatric:         Mood and Affect: Mood normal.         Thought Content: Thought content normal.         Judgment: Judgment normal.           Home Medications  Medication List      Your medication list        START taking these medications        Instructions Last Dose Given Next Dose Due   cyclobenzaprine 5 mg tablet  Commonly known as: Flexeril      Take 1 tablet (5 mg) by mouth 3 times a day as needed for muscle spasms for up to 10 days.       doxycycline 100 mg capsule  Commonly known as: Monodox      Take 1 capsule (100 mg) by mouth 2 times a day for 7 days. To prevent infection       oxyCODONE 5 mg immediate release tablet  Commonly known as: Roxicodone      Take 1 tablet (5 mg) by mouth every 6 hours if needed for severe pain (7 - 10) for up to 7 days.       pantoprazole 40 mg EC tablet  Commonly known as: ProtoNix      Take 1 tablet (40 mg) by mouth once daily as needed (heartburn). Do not crush, chew, or split.       senna 8.6 mg tablet  Generic drug: sennosides      Take 1 tablet (8.6 mg) by mouth 2 times a day. To prevent constipation       traMADol 50 mg tablet  Commonly known as: Ultram      Take 1 tablet (50 mg) by mouth every 6 hours if needed for severe pain (7 - 10) for up to 7 days.       Xarelto 10 mg tablet  Generic drug: rivaroxaban      Take 1 tablet (10 mg) by mouth once daily. To prevent blood clots              CHANGE how you take these medications        Instructions Last Dose Given Next Dose Due   acetaminophen 325 mg tablet  Commonly known as:  Tylenol  What changed:   medication strength  how much to take  when to take this      Take 3 tablets (975 mg) by mouth every 8 hours if needed for mild pain (1 - 3).              CONTINUE taking these medications        Instructions Last Dose Given Next Dose Due   albuterol 2.5 mg /3 mL (0.083 %) nebulizer solution      Take 3 mL (2.5 mg) by nebulization 4 times a day as needed for wheezing or shortness of breath.       albuterol 90 mcg/actuation inhaler  Commonly known as: ProAir HFA      Inhale 2 puffs every 4 hours if needed for wheezing.       fluticasone propion-salmeteroL 250-50 mcg/dose diskus inhaler  Commonly known as: Wixela Inhub      Inhale 1 puff 2 times a day. Rinse mouth with water after use to reduce aftertaste and incidence of candidiasis. Do not swallow.       NON FORMULARY           QUEtiapine 400 mg tablet  Commonly known as: SEROquel           QUEtiapine 50 mg tablet  Commonly known as: SEROquel                  STOP taking these medications      chlorhexidine 0.12 % solution  Commonly known as: Peridex        ibuprofen 200 mg tablet                  Where to Get Your Medications        These medications were sent to [facility]  [address], [address], [place] [address]      Hours: 8 AM to 6 PM Mon-Fri, 9 AM to 1 PM Saturday Phone: [phone]   acetaminophen 325 mg tablet  cyclobenzaprine 5 mg tablet  doxycycline 100 mg capsule  oxyCODONE 5 mg immediate release tablet  pantoprazole 40 mg EC tablet  senna 8.6 mg tablet  traMADol 50 mg tablet  Xarelto 10 mg tablet             Outpatient Follow-Up  3 weeks    OFFICE LOCATIONS    Location 1: [facility]  [address], [address]  [place] [address]  Office Number: [phone]    Location 2: [facility]  [address]  [facility], [address]  Office Number: [phone]    Location 3: [facility]  [address], [address]  [place] [address]  Office Number: [phone]       Please  read discharge instructions provided by your surgeon before calling with questions as this will delay care.    Medication refills-Oxycodone and Tramadol will be refilled every 7 days per state law. Please request refills through aroundtheway preferably/[phone]. All medication requests may take up to 72 hours to refill and refills after Friday 1pm will be refilled on the next business day.

## 2024-05-18 NOTE — PROGRESS NOTES
Medication Education     Medication education for Chloe RAGSDALE Vinay was provided to the patient  for the following medication(s):  Tylenol  Flexeril  Doxycycline  Oxycodone  Pantoprazole  Senna  Tramadol  Xarelto    Medication education provided by a Pharmacist:  -Proper dose, indication, possible ADRs   -How the medication works and benefits of taking it   -Importance of compliance   -Potential duration of therapy    Identified potential barriers to education:  None    Method(s) of Education:  Verbal Written materials provided and reviewed    An opportunity to ask questions and receive answers was provided.     Assessment of understanding the patient :  2= meets goals/outcomes    Additional Notes (if applicable): Meds to beds given to patient.    Adriana Pereyra, PharmD

## 2024-05-18 NOTE — NURSING NOTE
Vitals:    05/18/24 0715   BP: 94/56   Pulse: 87   Resp: 16   Temp: 35.9 °C (96.6 °F)   SpO2:       Patient lying in bed. Vital Is stable. All needs met at this moment. Call light within reach. Bed at lowest position. Continue monitor.

## 2024-05-18 NOTE — DISCHARGE SUMMARY
Discharge Diagnosis  Primary osteoarthritis of left hip    Issues Requiring Follow-Up  Follow-up with Dr. Rendon    Discharge Meds     Your medication list        START taking these medications        Instructions Last Dose Given Next Dose Due   cyclobenzaprine 5 mg tablet  Commonly known as: Flexeril      Take 1 tablet (5 mg) by mouth 3 times a day as needed for muscle spasms for up to 10 days.       doxycycline 100 mg capsule  Commonly known as: Monodox      Take 1 capsule (100 mg) by mouth 2 times a day for 7 days. To prevent infection       oxyCODONE 5 mg immediate release tablet  Commonly known as: Roxicodone      Take 1 tablet (5 mg) by mouth every 6 hours if needed for severe pain (7 - 10) for up to 7 days.       pantoprazole 40 mg EC tablet  Commonly known as: ProtoNix      Take 1 tablet (40 mg) by mouth once daily as needed (heartburn). Do not crush, chew, or split.       senna 8.6 mg tablet  Generic drug: sennosides      Take 1 tablet (8.6 mg) by mouth 2 times a day. To prevent constipation       traMADol 50 mg tablet  Commonly known as: Ultram      Take 1 tablet (50 mg) by mouth every 6 hours if needed for severe pain (7 - 10) for up to 7 days.       Xarelto 10 mg tablet  Generic drug: rivaroxaban      Take 1 tablet (10 mg) by mouth once daily. To prevent blood clots              CHANGE how you take these medications        Instructions Last Dose Given Next Dose Due   acetaminophen 325 mg tablet  Commonly known as: Tylenol  What changed:   medication strength  how much to take  when to take this      Take 3 tablets (975 mg) by mouth every 8 hours if needed for mild pain (1 - 3).              CONTINUE taking these medications        Instructions Last Dose Given Next Dose Due   albuterol 2.5 mg /3 mL (0.083 %) nebulizer solution      Take 3 mL (2.5 mg) by nebulization 4 times a day as needed for wheezing or shortness of breath.       albuterol 90 mcg/actuation inhaler  Commonly known as: ProAir HFA       Inhale 2 puffs every 4 hours if needed for wheezing.       fluticasone propion-salmeteroL 250-50 mcg/dose diskus inhaler  Commonly known as: Wixela Inhub      Inhale 1 puff 2 times a day. Rinse mouth with water after use to reduce aftertaste and incidence of candidiasis. Do not swallow.       NON FORMULARY           QUEtiapine 400 mg tablet  Commonly known as: SEROquel           QUEtiapine 50 mg tablet  Commonly known as: SEROquel                  STOP taking these medications      chlorhexidine 0.12 % solution  Commonly known as: Peridex        ibuprofen 200 mg tablet                  Where to Get Your Medications        These medications were sent to [facility]  [address], [facility] [address]      Hours: 8 AM to 6 PM Mon-Fri, 9 AM to 1 PM Saturday Phone: [phone]   acetaminophen 325 mg tablet  cyclobenzaprine 5 mg tablet  doxycycline 100 mg capsule  oxyCODONE 5 mg immediate release tablet  pantoprazole 40 mg EC tablet  senna 8.6 mg tablet  traMADol 50 mg tablet  Xarelto 10 mg tablet         Test Results Pending At Discharge  Pending Labs       No current pending labs.            Hospital Course   Patient is a 44-year-old female status post  Left total hip replacement; she has done well postoperatively without complications and is medically stable for discharge to home    Pertinent Physical Exam At Time of Discharge  Physical Exam  Patient is alert in no acute distress  Lungs are clear to auscultation and percussion  Cardiac is regular rate and rhythm normal S1-S2 without murmur gallop rub click S3 or S4  Abdomen is benign  Extremities without cyanosis clubbing erythema or edema  Operative site exam per Ortho    Outpatient Follow-Up  Future Appointments   Date Time Provider Department Center   5/19/2024 To Be Determined Virgilio Lindsey, PT [facility]   5/20/2024 To Be Determined Priya Tucker, OT [facility]   6/4/2024  1:00 PM Ramya Rendon DO [id number] [facility]   7/5/2024  1:40 PM  Gopi Heck, JAMSHID-CNP [id number] [facility]   4/14/2025  2:00 PM Edilberto King MD [id number] [facility]         Nessa Rain MD

## 2024-05-18 NOTE — NURSING NOTE
Vitals:    05/18/24 1233   BP: 106/56   Pulse: 96   Resp: 18   Temp: 36.3 °C (97.3 °F)   SpO2:       Patient lying in bed. Vital Is stable. All needs met at this moment. Call light within reach. Bed at lowest position. Continue monitor.

## 2024-05-19 ENCOUNTER — HOME CARE VISIT (OUTPATIENT)
Dept: HOME HEALTH SERVICES | Facility: HOME HEALTH | Age: 44
End: 2024-05-19
Payer: MEDICARE

## 2024-05-19 VITALS
DIASTOLIC BLOOD PRESSURE: 68 MMHG | HEART RATE: 102 BPM | SYSTOLIC BLOOD PRESSURE: 122 MMHG | OXYGEN SATURATION: 99 % | TEMPERATURE: 97.1 F

## 2024-05-19 PROCEDURE — 1090000001 HH PPS REVENUE CREDIT

## 2024-05-19 PROCEDURE — 1090000002 HH PPS REVENUE DEBIT

## 2024-05-19 PROCEDURE — 169592 NO-PAY CLAIM PROCEDURE

## 2024-05-19 PROCEDURE — 0023 HH SOC

## 2024-05-19 PROCEDURE — G0151 HHCP-SERV OF PT,EA 15 MIN: HCPCS | Mod: HHH

## 2024-05-19 ASSESSMENT — ENCOUNTER SYMPTOMS
PAIN LOCATION - PAIN SEVERITY: 3/10
HIGHEST PAIN SEVERITY IN PAST 24 HOURS: 1/10
PAIN SEVERITY GOAL: 0/10
LOWEST PAIN SEVERITY IN PAST 24 HOURS: 2/10
PAIN LOCATION: LEFT HIP
PAIN: 1
SUBJECTIVE PAIN PROGRESSION: WAXING AND WANING

## 2024-05-19 ASSESSMENT — ACTIVITIES OF DAILY LIVING (ADL)
ENTERING_EXITING_HOME: ONE PERSON
OASIS_M1830: 02

## 2024-05-19 NOTE — HOME HEALTH
Subjective:    Patient presents to her first home health PT visit following recent hospitalization for left total hip arthroplasty.  She resides in a 2 story [facility] with her .  She is currently ambulating with a FWW.  Prior to surgery she was independent without a device.  She reports post operative pain as anticipated.    Objective:    See OASIS SOC for details.    Assessment:    Chloe presents to PT with LE weakness, decreased ROM, pain, and difficulty ambulating.  She is appropriate and will benefit from skilled PT services to improve on these deficits and to achieve maximum functional benefit. PT reviewed post operative precautions and HEP with patient who demonstrated a good understanding of them.  She exhibited a negative Bassam's sign bilaterally.  Gait is antalgic and she lacks heel strike.    Plan:    Progress as tolerated.

## 2024-05-20 ENCOUNTER — HOME CARE VISIT (OUTPATIENT)
Dept: HOME HEALTH SERVICES | Facility: HOME HEALTH | Age: 44
End: 2024-05-20
Payer: MEDICARE

## 2024-05-20 PROCEDURE — 1090000002 HH PPS REVENUE DEBIT

## 2024-05-20 PROCEDURE — 1090000001 HH PPS REVENUE CREDIT

## 2024-05-20 NOTE — SIGNIFICANT EVENT
Thank you for taking my call today regarding your recent joint replacement surgery with Dr. Ramya Rendon.      We discussed that: Home Health Care services (physical and/or occupational therapy) have been initiated Your pain is Controlled on the current regimen Will fluctuate throughout recovery with increased activity You are able to tolerate regular activity and exercises The importance of continued cold therapy throughout recovery The importance of following the prescribed precautions by your surgeon You have not had a bowel movement, and we discussed the importance of a well balanced diet, hydration, and continued use of stool softener/laxative as prescribed The importance of continuing blood thinner as prescribed The importance of wearing compression stockings as prescribed    You indicated that all of your questions have been answered at the time of our call.    Please don't hesitate to reach out if you have any additional questions or concerns.    Katja Gentile MBA, BSN, RN-BC  RAMESH Joshi, RN  [facility]  [facility] [phone]

## 2024-05-21 ENCOUNTER — HOME CARE VISIT (OUTPATIENT)
Dept: HOME HEALTH SERVICES | Facility: HOME HEALTH | Age: 44
End: 2024-05-21
Payer: MEDICARE

## 2024-05-21 VITALS — TEMPERATURE: 96.6 F | HEART RATE: 96 BPM | OXYGEN SATURATION: 98 %

## 2024-05-21 PROCEDURE — G0152 HHCP-SERV OF OT,EA 15 MIN: HCPCS | Mod: HHH

## 2024-05-21 PROCEDURE — 1090000001 HH PPS REVENUE CREDIT

## 2024-05-21 PROCEDURE — 1090000002 HH PPS REVENUE DEBIT

## 2024-05-21 ASSESSMENT — ACTIVITIES OF DAILY LIVING (ADL)
WASHING_LB_CURRENT_FUNCTION: CONTACT GUARD ASSIST
DRESSING_UB_CURRENT_FUNCTION: INDEPENDENT
BATHING_CURRENT_FUNCTION: SUPERVISION
BATHING ASSESSED: 1
CURRENT_FUNCTION: INDEPENDENT
FEEDING_WITHIN_DEFINED_LIMITS: 1
WASHING_UPB_CURRENT_FUNCTION: SUPERVISION
PHYSICAL TRANSFERS ASSESSED: 1
GROOMING_WITHIN_DEFINED_LIMITS: 1

## 2024-05-21 ASSESSMENT — ENCOUNTER SYMPTOMS
DEPRESSION: 0
HIGHEST PAIN SEVERITY IN PAST 24 HOURS: 10/10
UNABLE TO COMMUNICATE PAIN: 1
PAIN LOCATION - PAIN FREQUENCY: INTERMITTENT
PAIN LOCATION - EXACERBATING FACTORS: MOVEMENT
PAIN LOCATION - PAIN SEVERITY: 4/10
PAIN LOCATION - PAIN DURATION: DAILY
LOSS OF SENSATION IN FEET: 0
PAIN LOCATION: LEFT HIP
OCCASIONAL FEELINGS OF UNSTEADINESS: 0
PAIN LOCATION - RELIEVING FACTORS: MEDS, ICE
PAIN LOCATION - PAIN QUALITY: ACHING
PERSON REPORTING PAIN: PATIENT

## 2024-05-22 ENCOUNTER — HOME CARE VISIT (OUTPATIENT)
Dept: HOME HEALTH SERVICES | Facility: HOME HEALTH | Age: 44
End: 2024-05-22
Payer: MEDICARE

## 2024-05-22 PROCEDURE — G0157 HHC PT ASSISTANT EA 15: HCPCS | Mod: CQ,HHH

## 2024-05-22 PROCEDURE — 1090000002 HH PPS REVENUE DEBIT

## 2024-05-22 PROCEDURE — 1090000001 HH PPS REVENUE CREDIT

## 2024-05-23 ENCOUNTER — TELEPHONE (OUTPATIENT)
Dept: ORTHOPEDIC SURGERY | Facility: CLINIC | Age: 44
End: 2024-05-23
Payer: MEDICARE

## 2024-05-23 DIAGNOSIS — M16.12 PRIMARY OSTEOARTHRITIS OF LEFT HIP: Primary | ICD-10-CM

## 2024-05-23 PROCEDURE — 1090000001 HH PPS REVENUE CREDIT

## 2024-05-23 PROCEDURE — 1090000002 HH PPS REVENUE DEBIT

## 2024-05-23 RX ORDER — ONDANSETRON 4 MG/1
4 TABLET, ORALLY DISINTEGRATING ORAL EVERY 8 HOURS PRN
Qty: 20 TABLET | Refills: 0 | Status: SHIPPED | OUTPATIENT
Start: 2024-05-23 | End: 2024-05-30

## 2024-05-23 NOTE — TELEPHONE ENCOUNTER
Patient had L AUGUSTO done on 5/17/24. She is requesting a refill on her oxycodone 5mg please be sent to the [facility] in [place]. She will run out during the long weekend.

## 2024-05-24 ENCOUNTER — HOME CARE VISIT (OUTPATIENT)
Dept: HOME HEALTH SERVICES | Facility: HOME HEALTH | Age: 44
End: 2024-05-24
Payer: MEDICARE

## 2024-05-24 DIAGNOSIS — M16.12 PRIMARY OSTEOARTHRITIS OF LEFT HIP: ICD-10-CM

## 2024-05-24 PROCEDURE — G0157 HHC PT ASSISTANT EA 15: HCPCS | Mod: CQ,HHH

## 2024-05-24 PROCEDURE — 1090000001 HH PPS REVENUE CREDIT

## 2024-05-24 PROCEDURE — 1090000002 HH PPS REVENUE DEBIT

## 2024-05-24 RX ORDER — OXYCODONE HYDROCHLORIDE 5 MG/1
5 TABLET ORAL EVERY 6 HOURS PRN
Qty: 28 TABLET | Refills: 0 | Status: SHIPPED | OUTPATIENT
Start: 2024-05-24 | End: 2024-06-04 | Stop reason: SDUPTHER

## 2024-05-25 PROCEDURE — 1090000001 HH PPS REVENUE CREDIT

## 2024-05-25 PROCEDURE — 1090000002 HH PPS REVENUE DEBIT

## 2024-05-26 PROCEDURE — 1090000002 HH PPS REVENUE DEBIT

## 2024-05-26 PROCEDURE — 1090000001 HH PPS REVENUE CREDIT

## 2024-05-26 ASSESSMENT — ENCOUNTER SYMPTOMS
PAIN: 1
PAIN LOCATION - PAIN QUALITY: ACHE
PAIN LOCATION: LEFT HIP
PAIN SEVERITY GOAL: 0/10
PAIN LOCATION - RELIEVING FACTORS: PAIN MEDS, ICE
SUBJECTIVE PAIN PROGRESSION: GRADUALLY IMPROVING
PERSON REPORTING PAIN: PATIENT
LOWEST PAIN SEVERITY IN PAST 24 HOURS: 5/10
HIGHEST PAIN SEVERITY IN PAST 24 HOURS: 8/10
PAIN LOCATION - PAIN SEVERITY: 6/10

## 2024-05-27 ENCOUNTER — HOME CARE VISIT (OUTPATIENT)
Dept: HOME HEALTH SERVICES | Facility: HOME HEALTH | Age: 44
End: 2024-05-27
Payer: MEDICARE

## 2024-05-27 PROCEDURE — G0157 HHC PT ASSISTANT EA 15: HCPCS | Mod: CQ,HHH

## 2024-05-27 PROCEDURE — 1090000001 HH PPS REVENUE CREDIT

## 2024-05-27 PROCEDURE — 1090000002 HH PPS REVENUE DEBIT

## 2024-05-28 PROCEDURE — 1090000002 HH PPS REVENUE DEBIT

## 2024-05-28 PROCEDURE — 1090000001 HH PPS REVENUE CREDIT

## 2024-05-28 PROCEDURE — G0180 MD CERTIFICATION HHA PATIENT: HCPCS | Performed by: ORTHOPAEDIC SURGERY

## 2024-05-28 ASSESSMENT — PAIN DESCRIPTION - PAIN TYPE: TYPE: ACUTE PAIN;SURGICAL PAIN

## 2024-05-29 PROCEDURE — 1090000001 HH PPS REVENUE CREDIT

## 2024-05-29 PROCEDURE — 1090000002 HH PPS REVENUE DEBIT

## 2024-05-30 PROCEDURE — 1090000001 HH PPS REVENUE CREDIT

## 2024-05-30 PROCEDURE — 1090000002 HH PPS REVENUE DEBIT

## 2024-05-31 ENCOUNTER — HOME CARE VISIT (OUTPATIENT)
Dept: HOME HEALTH SERVICES | Facility: HOME HEALTH | Age: 44
End: 2024-05-31
Payer: MEDICARE

## 2024-05-31 PROCEDURE — 1090000001 HH PPS REVENUE CREDIT

## 2024-05-31 PROCEDURE — 1090000002 HH PPS REVENUE DEBIT

## 2024-05-31 PROCEDURE — G0157 HHC PT ASSISTANT EA 15: HCPCS | Mod: CQ,HHH

## 2024-06-01 PROCEDURE — 1090000002 HH PPS REVENUE DEBIT

## 2024-06-01 PROCEDURE — 1090000001 HH PPS REVENUE CREDIT

## 2024-06-02 PROCEDURE — 1090000002 HH PPS REVENUE DEBIT

## 2024-06-02 PROCEDURE — 1090000001 HH PPS REVENUE CREDIT

## 2024-06-03 ENCOUNTER — HOME CARE VISIT (OUTPATIENT)
Dept: HOME HEALTH SERVICES | Facility: HOME HEALTH | Age: 44
End: 2024-06-03
Payer: MEDICARE

## 2024-06-03 PROCEDURE — G0157 HHC PT ASSISTANT EA 15: HCPCS | Mod: CQ,HHH

## 2024-06-03 PROCEDURE — 1090000001 HH PPS REVENUE CREDIT

## 2024-06-03 PROCEDURE — 1090000002 HH PPS REVENUE DEBIT

## 2024-06-04 ENCOUNTER — OFFICE VISIT (OUTPATIENT)
Dept: ORTHOPEDIC SURGERY | Facility: CLINIC | Age: 44
End: 2024-06-04
Payer: MEDICARE

## 2024-06-04 VITALS — HEIGHT: 68 IN | WEIGHT: 241 LBS | BODY MASS INDEX: 36.53 KG/M2

## 2024-06-04 DIAGNOSIS — M16.12 PRIMARY OSTEOARTHRITIS OF LEFT HIP: ICD-10-CM

## 2024-06-04 PROCEDURE — 1090000001 HH PPS REVENUE CREDIT

## 2024-06-04 PROCEDURE — 99024 POSTOP FOLLOW-UP VISIT: CPT | Performed by: ORTHOPAEDIC SURGERY

## 2024-06-04 PROCEDURE — 1090000002 HH PPS REVENUE DEBIT

## 2024-06-04 PROCEDURE — 3008F BODY MASS INDEX DOCD: CPT | Performed by: ORTHOPAEDIC SURGERY

## 2024-06-04 RX ORDER — OXYCODONE HYDROCHLORIDE 5 MG/1
5 TABLET ORAL EVERY 6 HOURS PRN
Qty: 28 TABLET | Refills: 0 | Status: SHIPPED | OUTPATIENT
Start: 2024-06-04 | End: 2024-06-11 | Stop reason: SDUPTHER

## 2024-06-04 ASSESSMENT — PAIN SCALES - GENERAL: PAINLEVEL_OUTOF10: 4

## 2024-06-04 ASSESSMENT — ENCOUNTER SYMPTOMS
DENIES PAIN: 1
PERSON REPORTING PAIN: PATIENT

## 2024-06-04 NOTE — PROGRESS NOTES
ORTHOPEDIC TOTAL JOINT  POST-OPERATIVE FOLLOW UP      ============================  IMPRESSION/PLAN:  ============================  44 y.o. female s/p Left Total Hip Replacement completed on 05/17/2024.    PLAN:  -Weightbearing: Weight bearing as tolerated  -DVT Prophylaxis: Xarelto 10 mg daily for 30 days postoperative  -Radiology Studies: None today  -Therapies: Continue PT/OT, okay to transition outpatient therapy  -Transition off assistive device as seen fit by patient and therapy  -Follow-up: 7 weeks with x-rays        Chloe Mclean presents today for follow up of joint replacement above. Pain controlled with current treatment plan. Patient has begun physical therapy. They are currently doing therapy at home. She is ready to proceed to out patient PT.  They are currently ambulating with Cane. She is doing very well overall.     Review of Systems:   Constitutional: See HPI for pain assessment, No significant weight loss, recent trauma. Denies fevers/chills  Cardiovascular: No chest pain, shortness of breath  Respiratory: No difficulty breathing, cough  Gastrointestinal: No nausea, vomiting, diarrhea, constipation  Musculoskeletal: Noted in HPI, no arthralgias   Integumentary: No rashes, easy bruising, redness   Neurological: no numbness or tingling in extremities, no gait disturbances     Patient Active Problem List   Diagnosis    Bipolar depression (Multi)    Closed displaced fracture of neck of right fifth metacarpal bone    Acetabulum fracture, left (Multi)    Fracture, hip (Multi)    Hemoptysis    Hypovitaminosis D    Lupus (Multi)    MSSA (methicillin susceptible Staphylococcus aureus) infection    Necrosis of surgical wound (Multi)    Osteomyelitis of pelvis (Multi)    Pelvic abscess in female    Right knee pain    Seborrheic dermatitis of scalp    Urinary retention    Weight gain    Wound infection after surgery    Screening for lipid disorders    Hyperglycemia    Anemia    Exertional shortness of  breath    Vaping nicotine dependence, tobacco product    Advance directive discussed with patient       =================================  EXAM  =================================  GENERAL: A/Ox3, NAD. Appears healthy, well nourished  CARDIAC: regular rate  LUNGS: Breathing non-labored    MUSCULOSKELETAL:  Laterality: left Hip exam  - Strength: Abduction 5/5, Flexion 5/5  - Palpation: non TTP along surgical site  - Incision: No overlying, erythema, induration, or drainage. Incision healing well with no wound dehiscence  - EHL/PF/DF motor intact  - compartments soft, negative homans  - Gait: using cane    NEUROVASCULAR:  - Neurovascular Status: sensation intact to light touch distally  - Capillary refill brisk at extremities, Bilateral dorsalis pedis pulse 2+     IMAGING: None today      Ramya Rendon, DO  Attending Surgeon  Joint Replacement and Adult Reconstructive Surgery  [place], [place]

## 2024-06-05 PROCEDURE — 1090000001 HH PPS REVENUE CREDIT

## 2024-06-05 PROCEDURE — 1090000002 HH PPS REVENUE DEBIT

## 2024-06-06 ENCOUNTER — HOME CARE VISIT (OUTPATIENT)
Dept: HOME HEALTH SERVICES | Facility: HOME HEALTH | Age: 44
End: 2024-06-06
Payer: MEDICARE

## 2024-06-06 PROCEDURE — 1090000002 HH PPS REVENUE DEBIT

## 2024-06-06 PROCEDURE — 1090000001 HH PPS REVENUE CREDIT

## 2024-06-06 PROCEDURE — G0157 HHC PT ASSISTANT EA 15: HCPCS | Mod: CQ,HHH

## 2024-06-07 PROCEDURE — 1090000002 HH PPS REVENUE DEBIT

## 2024-06-07 PROCEDURE — 1090000001 HH PPS REVENUE CREDIT

## 2024-06-07 ASSESSMENT — ENCOUNTER SYMPTOMS: DENIES PAIN: 1

## 2024-06-08 PROCEDURE — 1090000002 HH PPS REVENUE DEBIT

## 2024-06-08 PROCEDURE — 1090000001 HH PPS REVENUE CREDIT

## 2024-06-09 PROCEDURE — 1090000001 HH PPS REVENUE CREDIT

## 2024-06-09 PROCEDURE — 1090000002 HH PPS REVENUE DEBIT

## 2024-06-10 PROCEDURE — 1090000002 HH PPS REVENUE DEBIT

## 2024-06-10 PROCEDURE — 1090000001 HH PPS REVENUE CREDIT

## 2024-06-11 ENCOUNTER — TELEPHONE (OUTPATIENT)
Dept: ORTHOPEDIC SURGERY | Facility: CLINIC | Age: 44
End: 2024-06-11
Payer: MEDICARE

## 2024-06-11 ENCOUNTER — HOME CARE VISIT (OUTPATIENT)
Dept: HOME HEALTH SERVICES | Facility: HOME HEALTH | Age: 44
End: 2024-06-11
Payer: MEDICARE

## 2024-06-11 VITALS — RESPIRATION RATE: 12 BRPM

## 2024-06-11 DIAGNOSIS — M16.12 PRIMARY OSTEOARTHRITIS OF LEFT HIP: ICD-10-CM

## 2024-06-11 PROCEDURE — G0151 HHCP-SERV OF PT,EA 15 MIN: HCPCS | Mod: HHH

## 2024-06-11 RX ORDER — OXYCODONE HYDROCHLORIDE 5 MG/1
5 TABLET ORAL EVERY 6 HOURS PRN
Qty: 28 TABLET | Refills: 0 | Status: SHIPPED | OUTPATIENT
Start: 2024-06-11 | End: 2024-06-18

## 2024-06-11 SDOH — HEALTH STABILITY: PHYSICAL HEALTH: EXERCISE ACTIVITY: L THA

## 2024-06-11 SDOH — HEALTH STABILITY: PHYSICAL HEALTH: EXERCISE ACTIVITIES SETS: 2

## 2024-06-11 SDOH — HEALTH STABILITY: PHYSICAL HEALTH: PHYSICAL EXERCISE: 10

## 2024-06-11 SDOH — HEALTH STABILITY: PHYSICAL HEALTH: PHYSICAL EXERCISE: SIT, RECLINED, STANDING

## 2024-06-11 ASSESSMENT — ENCOUNTER SYMPTOMS
PAIN LOCATION - PAIN QUALITY: THA
HIGHEST PAIN SEVERITY IN PAST 24 HOURS: 3/10
LIMITED RANGE OF MOTION: 1
PERSON REPORTING PAIN: PATIENT
PAIN: 1
SUBJECTIVE PAIN PROGRESSION: GRADUALLY IMPROVING
MUSCLE WEAKNESS: 1
PAIN SEVERITY GOAL: 0/10
PAIN LOCATION: LEFT HIP
LOWEST PAIN SEVERITY IN PAST 24 HOURS: 0/10

## 2024-06-11 ASSESSMENT — ACTIVITIES OF DAILY LIVING (ADL)
PHYSICAL TRANSFERS ASSESSED: 1
AMBULATION ASSISTANCE: INDEPENDENT
OASIS_M1830: 00
CURRENT_FUNCTION: INDEPENDENT
AMBULATION ASSISTANCE: 1
HOME_HEALTH_OASIS: 00

## 2024-06-11 NOTE — TELEPHONE ENCOUNTER
Patient had l AUGUSTO done on 5/17/24 and would like a refill on her oxycodone 5mg. Please send to [facility]

## 2024-06-14 ENCOUNTER — EVALUATION (OUTPATIENT)
Dept: PHYSICAL THERAPY | Facility: HOSPITAL | Age: 44
End: 2024-06-14
Payer: MEDICARE

## 2024-06-14 DIAGNOSIS — R26.9 GAIT ABNORMALITY: Primary | ICD-10-CM

## 2024-06-14 DIAGNOSIS — M16.12 PRIMARY OSTEOARTHRITIS OF LEFT HIP: ICD-10-CM

## 2024-06-14 DIAGNOSIS — R29.898 LEFT LEG WEAKNESS: ICD-10-CM

## 2024-06-14 PROCEDURE — 97161 PT EVAL LOW COMPLEX 20 MIN: CPT | Mod: GP | Performed by: PHYSICAL THERAPIST

## 2024-06-14 PROCEDURE — 97110 THERAPEUTIC EXERCISES: CPT | Mod: GP | Performed by: PHYSICAL THERAPIST

## 2024-06-14 ASSESSMENT — ENCOUNTER SYMPTOMS
LOSS OF SENSATION IN FEET: 0
OCCASIONAL FEELINGS OF UNSTEADINESS: 0
DEPRESSION: 0

## 2024-06-14 ASSESSMENT — PATIENT HEALTH QUESTIONNAIRE - PHQ9
2. FEELING DOWN, DEPRESSED OR HOPELESS: NOT AT ALL
SUM OF ALL RESPONSES TO PHQ9 QUESTIONS 1 AND 2: 0
1. LITTLE INTEREST OR PLEASURE IN DOING THINGS: NOT AT ALL

## 2024-06-14 ASSESSMENT — PAIN SCALES - GENERAL: PAINLEVEL_OUTOF10: 1

## 2024-06-14 ASSESSMENT — PAIN - FUNCTIONAL ASSESSMENT: PAIN_FUNCTIONAL_ASSESSMENT: 0-10

## 2024-06-14 NOTE — PROGRESS NOTES
Physical Therapy    Physical Therapy Evaluation    Patient Name: Chloe Mclean  MRN: [id number]  : 1980  Referring Physician: Ramya Rendon  Today's Date: 2024  Time Calculation  Start Time: 1050  Stop Time: 1126  Time Calculation (min): 36 min  PT Evaluation Time Entry  PT Evaluation (Low) Time Entry: 26  PT Therapeutic Procedures Time Entry  Therapeutic Exercise Time Entry: 10           General  General Comment: Visit #1 Pending Auth    Current Problem  Problem List Items Addressed This Visit    None  Visit Diagnoses         Codes    Gait abnormality    -  Primary R26.9    Relevant Orders    Follow Up In Physical Therapy    Primary osteoarthritis of left hip     M16.12    Relevant Orders    Follow Up In Physical Therapy    Left leg weakness     R29.898    Relevant Orders    Follow Up In Physical Therapy               SUBJECTIVE  Subjective   Patient underwent left AUGUSTO 24. She reports that her pain has improved well sincer her AUGUSTO but she is having difficulty with amb since the AUGUSTO and has a goal to return to her previous level of function.   Pain is reported to range 2-10/10 with daily activities.      Prior level of function: ind amb without assistive device    Patient's name and  were confirmed this date.        Precautions  Precautions  STEADI Fall Risk Score (The score of 4 or more indicates an increased risk of falling): 0  Precautions Comment: Osteoporosis       Pain  Pain Assessment: 0-10  Pain Score: 1  Pain Location: Hip  Pain Orientation: Left        OBJECTIVE:    Lower Extremity Strength:    MMT 5/5 max  LEFT RIGHT   Hip Flexion  4/5   4+/5        Hip Abduction  4/5   4+/5   Hip Adduction  4+/5   4+/5   Knee Extension  4+/5   4+/5   Knee Flexion   4+/5   4+/5                         Lower Extremity ROM:   LE ROM not listed below is WNL              Gait mechanics: Antalgic gait with straight cane with decreased stance time on left stance phase of gait      Other Measures  Lower  Extremity Funtional Score (LEFS): 29       TREATMENT:    EXERCISES Date 6/14/24 Date Date Date   VISIT # # 1 # # #   HEP              Nustep                     Shuttle  DLP       Shuttle SLP       Shuttle TR/HR                                                 Mini squat       Mini lunge       3 way LE raise                                                                                                      PATIENT EDUCATION:  Access Code: [email or web address]  URL: [email or web address]  Date: 06/14/2024  Prepared by: Tam Abreu    Exercises  - Standing Hip Abduction  - 3 x daily - 7 x weekly - 2 sets - 10 reps - 2 seconds hold  - Standing Hip Extension  - 3 x daily - 7 x weekly - 2 sets - 10 reps - 2 seconds hold  - Mini Squat with Counter Support  - 3 x daily - 7 x weekly - 2 sets - 10 reps - 2 seconds hold    ASSESSEMENT  Pt presents with decreased left LE strength and faulty gait pattern and , d/t this, require skilled PT to return to previous level of function    Rehab potential: Excellent    PLAN  Treatment/Interventions: Therapeutic activities, Therapeutic exercises, Gait training  PT Plan: Skilled PT  PT Frequency: 2 times per week  Duration: 6 weeks    Pt. Is in agreement with PT plan.  Goals:  Active       PT Problem       PT Goal 1       Start:  06/14/24    Expected End:  07/29/24       Short tem goals to be achieve within 4 weeks:    1. Pt's left LE strength will improve to 4+/5 throughout to improve gait safety    Long term goals to be achieved within 6 weeks:  1.   Pt. will present with normalized gait pattern without assistive device for improved gait safety  4.   Pt's stated goal is to improve strength and gait

## 2024-06-18 ENCOUNTER — APPOINTMENT (OUTPATIENT)
Dept: PHYSICAL THERAPY | Facility: HOSPITAL | Age: 44
End: 2024-06-18
Payer: MEDICARE

## 2024-06-19 ENCOUNTER — TELEPHONE (OUTPATIENT)
Dept: ORTHOPEDIC SURGERY | Facility: CLINIC | Age: 44
End: 2024-06-19
Payer: MEDICARE

## 2024-06-19 DIAGNOSIS — M16.12 PRIMARY OSTEOARTHRITIS OF LEFT HIP: ICD-10-CM

## 2024-06-19 RX ORDER — OXYCODONE HYDROCHLORIDE 5 MG/1
5 TABLET ORAL EVERY 6 HOURS PRN
Qty: 28 TABLET | Refills: 0 | Status: SHIPPED | OUTPATIENT
Start: 2024-06-19 | End: 2024-06-26

## 2024-06-19 NOTE — TELEPHONE ENCOUNTER
Patient had L AUGUSTO done 5/17/24.   She would like one more refill on the oxycodone 5mg, please send to [facility] in [place]

## 2024-06-20 ENCOUNTER — TREATMENT (OUTPATIENT)
Dept: PHYSICAL THERAPY | Facility: HOSPITAL | Age: 44
End: 2024-06-20
Payer: MEDICARE

## 2024-06-20 DIAGNOSIS — R26.9 GAIT ABNORMALITY: Primary | ICD-10-CM

## 2024-06-20 DIAGNOSIS — M16.12 PRIMARY OSTEOARTHRITIS OF LEFT HIP: ICD-10-CM

## 2024-06-20 DIAGNOSIS — R29.898 LEFT LEG WEAKNESS: ICD-10-CM

## 2024-06-20 PROCEDURE — 97110 THERAPEUTIC EXERCISES: CPT | Mod: GP,CQ

## 2024-06-20 ASSESSMENT — PAIN - FUNCTIONAL ASSESSMENT: PAIN_FUNCTIONAL_ASSESSMENT: 0-10

## 2024-06-20 ASSESSMENT — PAIN SCALES - GENERAL: PAINLEVEL_OUTOF10: 0 - NO PAIN

## 2024-06-20 NOTE — PROGRESS NOTES
"Physical Therapy    Physical Therapy Treatment    Patient Name: Chloe Mclean  MRN: [id number]  Today's Date: 6/20/2024  Time Calculation  Start Time: 0210  Stop Time: 0250  Time Calculation (min): 40 min    PT Therapeutic Procedures Time Entry  Therapeutic Exercise Time Entry: 40          VISIT:# 2/8 VISITS 6/14 TO 9/14    Current Problem  1. Gait abnormality  Follow Up In Physical Therapy      2. Primary osteoarthritis of left hip  Follow Up In Physical Therapy      3. Left leg weakness  Follow Up In Physical Therapy          Subjective   Pt asks for a HEP for the pool      Precautions  Precautions  STEADI Fall Risk Score (The score of 4 or more indicates an increased risk of falling): 0  Precautions Comment: Osteoporosis       Pain  Pain Assessment: 0-10  0-10 (Numeric) Pain Score: 0 - No pain  Pain Location: Hip  Pain Orientation: Left       Objective    Pool HEP was provided and scanned into the system         Treatments: L AUGUSTO  EXERCISES Date 6/14/24 Date Date Date   VISIT # # 1 # # #   HEP              Nustep 10' @L1      Piriformis str Seated 2 x 30\"             Shuttle  DLP 5B 10 x 2      Shuttle SLP 3B 2 x 10       Shuttle TR/HR 3B 2 x 10                     Mini squat  x 10       Mini lunge       3 way LE raise  ALEA 2 x 10                                       Assessment:   Pt was sore 4/10 in her butt after therapy.  Had pt sit and do a piriformis stretch.  Pt's pain was 2/10 after stretches.        Plan: Monitor HEP and improve strength   OP PT Plan  Treatment/Interventions: Therapeutic activities, Therapeutic exercises, Gait training  PT Plan: Skilled PT  PT Frequency: 2 times per week  Duration: 6 weeks    Goals:  Active       PT Problem       PT Goal 1       Start:  06/14/24    Expected End:  07/29/24       Short tem goals to be achieve within 4 weeks:    1. Pt's left LE strength will improve to 4+/5 throughout to improve gait safety    Long term goals to be achieved within 6 weeks:  1.   Pt. will " present with normalized gait pattern without assistive device for improved gait safety  4.   Pt's stated goal is to improve strength and gait

## 2024-06-26 ENCOUNTER — DOCUMENTATION (OUTPATIENT)
Dept: PHYSICAL THERAPY | Facility: HOSPITAL | Age: 44
End: 2024-06-26
Payer: MEDICARE

## 2024-06-26 ENCOUNTER — APPOINTMENT (OUTPATIENT)
Dept: PHYSICAL THERAPY | Facility: HOSPITAL | Age: 44
End: 2024-06-26
Payer: MEDICARE

## 2024-06-26 NOTE — PROGRESS NOTES
Physical Therapy                 Therapy Communication Note    Patient Name: Chloe RAGSDALE Vinay  MRN: [id number]  Today's Date: 6/26/2024     Discipline: Physical Therapy    Missed Visit Reason:      Missed Time: Cancel    Comment:

## 2024-07-02 ENCOUNTER — DOCUMENTATION (OUTPATIENT)
Dept: PHYSICAL THERAPY | Facility: HOSPITAL | Age: 44
End: 2024-07-02
Payer: MEDICARE

## 2024-07-02 ENCOUNTER — APPOINTMENT (OUTPATIENT)
Dept: PHYSICAL THERAPY | Facility: HOSPITAL | Age: 44
End: 2024-07-02
Payer: MEDICARE

## 2024-07-02 NOTE — PROGRESS NOTES
Physical Therapy                 Therapy Communication Note    Patient Name: Chloe RAGSDALE Vinay  MRN: [id number]  Today's Date: 7/2/2024     Discipline: Physical Therapy        Missed Time: No Show    Comment: Pt. Did not show today.  She did call  after her appt. Time to report that she missed her appt. D/t being ill

## 2024-07-05 ENCOUNTER — APPOINTMENT (OUTPATIENT)
Dept: PRIMARY CARE | Facility: CLINIC | Age: 44
End: 2024-07-05
Payer: MEDICARE

## 2024-07-05 VITALS
BODY MASS INDEX: 37.44 KG/M2 | OXYGEN SATURATION: 97 % | WEIGHT: 247 LBS | SYSTOLIC BLOOD PRESSURE: 122 MMHG | DIASTOLIC BLOOD PRESSURE: 72 MMHG | HEART RATE: 87 BPM | RESPIRATION RATE: 16 BRPM | HEIGHT: 68 IN

## 2024-07-05 DIAGNOSIS — Z12.12 SCREENING FOR COLORECTAL CANCER: ICD-10-CM

## 2024-07-05 DIAGNOSIS — Z12.11 SCREENING FOR COLORECTAL CANCER: ICD-10-CM

## 2024-07-05 DIAGNOSIS — F20.9 CHRONIC SCHIZOPHRENIA (MULTI): ICD-10-CM

## 2024-07-05 DIAGNOSIS — Z71.89 ADVANCE DIRECTIVE DISCUSSED WITH PATIENT: ICD-10-CM

## 2024-07-05 DIAGNOSIS — R73.9 HYPERGLYCEMIA: ICD-10-CM

## 2024-07-05 DIAGNOSIS — Z00.00 MEDICARE ANNUAL WELLNESS VISIT, SUBSEQUENT: Primary | ICD-10-CM

## 2024-07-05 DIAGNOSIS — F17.210 TOBACCO DEPENDENCE DUE TO CIGARETTES: ICD-10-CM

## 2024-07-05 DIAGNOSIS — J01.00 ACUTE MAXILLARY SINUSITIS, RECURRENCE NOT SPECIFIED: ICD-10-CM

## 2024-07-05 DIAGNOSIS — J30.9 ALLERGIC RHINITIS, UNSPECIFIED SEASONALITY, UNSPECIFIED TRIGGER: ICD-10-CM

## 2024-07-05 DIAGNOSIS — Z12.31 ENCOUNTER FOR SCREENING MAMMOGRAM FOR MALIGNANT NEOPLASM OF BREAST: ICD-10-CM

## 2024-07-05 DIAGNOSIS — E55.9 VITAMIN D DEFICIENCY: ICD-10-CM

## 2024-07-05 DIAGNOSIS — D64.9 ANEMIA, UNSPECIFIED TYPE: ICD-10-CM

## 2024-07-05 DIAGNOSIS — E66.01 OBESITY, MORBID (MULTI): ICD-10-CM

## 2024-07-05 PROBLEM — S73.006A CLOSED TRAUMATIC DISLOCATION OF HIP (MULTI): Status: ACTIVE | Noted: 2023-09-07

## 2024-07-05 PROBLEM — M86.159: Status: ACTIVE | Noted: 2024-07-05

## 2024-07-05 PROBLEM — G47.01 INSOMNIA DUE TO MEDICAL CONDITION: Status: ACTIVE | Noted: 2017-06-28

## 2024-07-05 PROBLEM — R91.8 LUNG MASS: Status: ACTIVE | Noted: 2017-06-28

## 2024-07-05 PROBLEM — M25.559 ARTHRALGIA OF HIP: Status: ACTIVE | Noted: 2024-07-05

## 2024-07-05 PROBLEM — R21 RASH AND OTHER NONSPECIFIC SKIN ERUPTION: Status: ACTIVE | Noted: 2018-07-31

## 2024-07-05 PROBLEM — L21.0 SEBORRHEA CAPITIS: Status: ACTIVE | Noted: 2024-07-05

## 2024-07-05 PROBLEM — R33.9 RETENTION OF URINE: Status: ACTIVE | Noted: 2019-02-06

## 2024-07-05 PROBLEM — N73.9 ABSCESS OF FEMALE PELVIS: Status: ACTIVE | Noted: 2024-07-05

## 2024-07-05 PROBLEM — L03.211 FACIAL CELLULITIS: Status: ACTIVE | Noted: 2023-09-07

## 2024-07-05 PROBLEM — T81.49XA POSTOPERATIVE WOUND INFECTION: Status: ACTIVE | Noted: 2023-05-03

## 2024-07-05 PROBLEM — G56.01 CARPAL TUNNEL SYNDROME OF RIGHT WRIST: Status: ACTIVE | Noted: 2017-06-28

## 2024-07-05 PROBLEM — G47.33 OBSTRUCTIVE SLEEP APNEA SYNDROME: Status: ACTIVE | Noted: 2023-09-07

## 2024-07-05 PROBLEM — M17.11 PRIMARY OSTEOARTHRITIS OF RIGHT KNEE: Status: ACTIVE | Noted: 2017-02-21

## 2024-07-05 PROBLEM — A49.01 METHICILLIN SUSCEPTIBLE STAPHYLOCOCCUS AUREUS INFECTION: Status: ACTIVE | Noted: 2024-07-05

## 2024-07-05 PROBLEM — S32.423A: Status: ACTIVE | Noted: 2023-09-07

## 2024-07-05 PROBLEM — F31.30 BIPOLAR DISORDER, MOST RECENT EPISODE DEPRESSED (MULTI): Status: ACTIVE | Noted: 2021-07-13

## 2024-07-05 PROBLEM — T81.30XA WOUND DEHISCENCE: Status: ACTIVE | Noted: 2024-07-05

## 2024-07-05 PROBLEM — A59.01 TRICHOMONAS VAGINITIS: Status: ACTIVE | Noted: 2024-07-05

## 2024-07-05 PROBLEM — M79.7 FIBROMYALGIA: Status: ACTIVE | Noted: 2017-02-21

## 2024-07-05 PROBLEM — L08.9 LOCAL INFECTION OF WOUND: Status: ACTIVE | Noted: 2024-07-05

## 2024-07-05 PROBLEM — R93.89 ABNORMAL COMPUTERIZED AXIAL TOMOGRAPHY OF CHEST: Status: ACTIVE | Noted: 2023-07-13

## 2024-07-05 PROBLEM — J20.9 ACUTE BRONCHITIS: Status: ACTIVE | Noted: 2024-07-05

## 2024-07-05 PROBLEM — J98.19 PULMONARY COLLAPSE: Status: ACTIVE | Noted: 2023-09-07

## 2024-07-05 PROBLEM — S72.009A FRACTURE OF BONE OF HIP (MULTI): Status: ACTIVE | Noted: 2024-07-05

## 2024-07-05 PROBLEM — E66.9 OBESITY WITH BODY MASS INDEX 30 OR GREATER: Status: ACTIVE | Noted: 2023-09-07

## 2024-07-05 PROBLEM — J69.0 ASPIRATION PNEUMONIA (MULTI): Status: ACTIVE | Noted: 2023-09-07

## 2024-07-05 PROBLEM — K52.9 COLITIS: Status: ACTIVE | Noted: 2024-07-05

## 2024-07-05 PROBLEM — R76.8 ANA POSITIVE: Status: ACTIVE | Noted: 2017-02-21

## 2024-07-05 PROBLEM — F33.1 MODERATE EPISODE OF RECURRENT MAJOR DEPRESSIVE DISORDER (MULTI): Status: ACTIVE | Noted: 2017-06-28

## 2024-07-05 PROBLEM — J45.909 ASTHMA (HHS-HCC): Status: ACTIVE | Noted: 2018-07-31

## 2024-07-05 PROBLEM — S32.409A FRACTURE OF ACETABULUM (MULTI): Status: ACTIVE | Noted: 2024-07-05

## 2024-07-05 PROBLEM — K62.5 RECTAL HEMORRHAGE: Status: ACTIVE | Noted: 2023-09-07

## 2024-07-05 PROBLEM — L30.9 ACUTE DERMATITIS: Status: ACTIVE | Noted: 2018-07-31

## 2024-07-05 PROBLEM — L40.50 PSORIATIC ARTHROPATHY (MULTI): Status: ACTIVE | Noted: 2017-02-21

## 2024-07-05 PROBLEM — M25.569 KNEE PAIN: Status: ACTIVE | Noted: 2024-07-05

## 2024-07-05 PROBLEM — L93.0 LUPUS ERYTHEMATOSUS: Status: ACTIVE | Noted: 2021-07-13

## 2024-07-05 PROBLEM — L73.9 FOLLICULITIS: Status: ACTIVE | Noted: 2018-07-31

## 2024-07-05 PROBLEM — S62.338A CLOSED FRACTURE OF NECK OF FIFTH METACARPAL BONE: Status: ACTIVE | Noted: 2024-07-05

## 2024-07-05 PROBLEM — T14.8XXA LOCAL INFECTION OF WOUND: Status: ACTIVE | Noted: 2024-07-05

## 2024-07-05 PROBLEM — J06.9 ACUTE UPPER RESPIRATORY INFECTION: Status: ACTIVE | Noted: 2023-09-07

## 2024-07-05 PROBLEM — R06.09 DYSPNEA ON EXERTION: Status: ACTIVE | Noted: 2023-07-13

## 2024-07-05 PROBLEM — N39.0 URINARY TRACT INFECTIOUS DISEASE: Status: ACTIVE | Noted: 2023-09-07

## 2024-07-05 PROCEDURE — 99214 OFFICE O/P EST MOD 30 MIN: CPT | Performed by: NURSE PRACTITIONER

## 2024-07-05 PROCEDURE — G0439 PPPS, SUBSEQ VISIT: HCPCS | Performed by: NURSE PRACTITIONER

## 2024-07-05 PROCEDURE — G0447 BEHAVIOR COUNSEL OBESITY 15M: HCPCS | Performed by: NURSE PRACTITIONER

## 2024-07-05 PROCEDURE — 99406 BEHAV CHNG SMOKING 3-10 MIN: CPT | Performed by: NURSE PRACTITIONER

## 2024-07-05 PROCEDURE — 99497 ADVNCD CARE PLAN 30 MIN: CPT | Performed by: NURSE PRACTITIONER

## 2024-07-05 PROCEDURE — 3008F BODY MASS INDEX DOCD: CPT | Performed by: NURSE PRACTITIONER

## 2024-07-05 RX ORDER — AMOXICILLIN AND CLAVULANATE POTASSIUM 875; 125 MG/1; MG/1
875 TABLET, FILM COATED ORAL 2 TIMES DAILY
Qty: 20 TABLET | Refills: 0 | Status: SHIPPED | OUTPATIENT
Start: 2024-07-05 | End: 2024-07-15

## 2024-07-05 ASSESSMENT — ANXIETY QUESTIONNAIRES
4. TROUBLE RELAXING: NOT AT ALL
IF YOU CHECKED OFF ANY PROBLEMS ON THIS QUESTIONNAIRE, HOW DIFFICULT HAVE THESE PROBLEMS MADE IT FOR YOU TO DO YOUR WORK, TAKE CARE OF THINGS AT HOME, OR GET ALONG WITH OTHER PEOPLE: NOT DIFFICULT AT ALL
7. FEELING AFRAID AS IF SOMETHING AWFUL MIGHT HAPPEN: NOT AT ALL
6. BECOMING EASILY ANNOYED OR IRRITABLE: NOT AT ALL
3. WORRYING TOO MUCH ABOUT DIFFERENT THINGS: NOT AT ALL
1. FEELING NERVOUS, ANXIOUS, OR ON EDGE: NOT AT ALL
2. NOT BEING ABLE TO STOP OR CONTROL WORRYING: NOT AT ALL
GAD7 TOTAL SCORE: 0
5. BEING SO RESTLESS THAT IT IS HARD TO SIT STILL: NOT AT ALL

## 2024-07-05 ASSESSMENT — ACTIVITIES OF DAILY LIVING (ADL)
GROCERY_SHOPPING: NEEDS ASSISTANCE
DOING_HOUSEWORK: NEEDS ASSISTANCE
TAKING_MEDICATION: NEEDS ASSISTANCE
MANAGING_FINANCES: INDEPENDENT
DRESSING: INDEPENDENT
BATHING: INDEPENDENT

## 2024-07-05 ASSESSMENT — ENCOUNTER SYMPTOMS
DEPRESSION: 0
LOSS OF SENSATION IN FEET: 0
OCCASIONAL FEELINGS OF UNSTEADINESS: 0

## 2024-07-05 NOTE — PATIENT INSTRUCTIONS
I have prescribed Augmentin for your sinus infection.  I have ordered a screening mammogram and colonoscopy to be completed as advised.  Complete labs a few days prior to 6-month follow-up.

## 2024-07-05 NOTE — PROGRESS NOTES
"Subjective   Reason for Visit: Chloe Mclean is an 44 y.o. female here for a Medicare Wellness visit.     Past Medical, Surgical, and Family History reviewed and updated in chart.    Reviewed all medications by prescribing practitioner or clinical pharmacist (such as prescriptions, OTCs, herbal therapies and supplements) and documented in the medical record.    Patient is accompanied by her mom and also following up for management of multiple chronic diseases.  She presents with complaint of sinus pain/pressure/congestion, malaise, facial tenderness, mucopurulent nasal discharge for about 2 weeks.  Advises she takes her medications as prescribed with no side effect noted.        Patient Care Team:  MARTY You as PCP - General (Family Medicine)  MARTY You as PCP - [employer] PCP  Katja Gentile RN as Registered Nurse (Orthopaedic Surgery)  Venus Bolanos RN as Registered Nurse (Orthopaedic Surgery)     Review of Systems   HENT:  Positive for congestion, facial swelling, rhinorrhea, sinus pressure and sinus pain.    All other systems reviewed and are negative.      Objective   Vitals:  /72   Pulse 87   Resp 16   Ht 1.727 m (5' 8\")   Wt 112 kg (247 lb)   SpO2 97%   BMI 37.56 kg/m²       Physical Exam  Vitals reviewed.   Constitutional:       Appearance: Normal appearance. She is obese.   HENT:      Head: Normocephalic and atraumatic.      Right Ear: Tympanic membrane, ear canal and external ear normal.      Left Ear: Tympanic membrane, ear canal and external ear normal.      Nose: Congestion and rhinorrhea present.      Mouth/Throat:      Mouth: Mucous membranes are moist.   Cardiovascular:      Rate and Rhythm: Normal rate and regular rhythm.      Pulses: Normal pulses.      Heart sounds: Normal heart sounds.   Pulmonary:      Effort: Pulmonary effort is normal.      Breath sounds: Normal breath sounds.   Abdominal:      General: Bowel sounds are normal.    "   Palpations: Abdomen is soft.   Musculoskeletal:      Cervical back: Neck supple.   Skin:     General: Skin is warm and dry.   Neurological:      General: No focal deficit present.      Mental Status: She is alert and oriented to person, place, and time.   Psychiatric:         Mood and Affect: Mood normal.         Behavior: Behavior normal.         Thought Content: Thought content normal.         Judgment: Judgment normal.         Assessment/Plan   Problem List Items Addressed This Visit    None  Visit Diagnoses       Routine general medical examination at health care facility    -  Primary

## 2024-07-08 PROBLEM — Z12.12 SCREENING FOR COLORECTAL CANCER: Status: ACTIVE | Noted: 2023-07-13

## 2024-07-08 PROBLEM — Z87.39 HISTORY OF OSTEOMYELITIS: Status: ACTIVE | Noted: 2024-07-08

## 2024-07-08 PROBLEM — E66.01 OBESITY, MORBID (MULTI): Status: ACTIVE | Noted: 2024-07-08

## 2024-07-08 PROBLEM — J01.00 ACUTE MAXILLARY SINUSITIS: Status: ACTIVE | Noted: 2024-07-08

## 2024-07-08 PROBLEM — J06.9 ACUTE UPPER RESPIRATORY INFECTION: Status: RESOLVED | Noted: 2023-09-07 | Resolved: 2024-07-08

## 2024-07-08 PROBLEM — M86.159: Status: RESOLVED | Noted: 2024-07-05 | Resolved: 2024-07-08

## 2024-07-08 PROBLEM — Z12.11 SCREENING FOR COLORECTAL CANCER: Status: ACTIVE | Noted: 2023-07-13

## 2024-07-08 PROBLEM — M86.9: Status: RESOLVED | Noted: 2023-05-03 | Resolved: 2024-07-08

## 2024-07-08 PROBLEM — E55.9 HYPOVITAMINOSIS D: Status: RESOLVED | Noted: 2023-05-03 | Resolved: 2024-07-08

## 2024-07-08 ASSESSMENT — ENCOUNTER SYMPTOMS
RHINORRHEA: 1
FACIAL SWELLING: 1
SINUS PAIN: 1
SINUS PRESSURE: 1

## 2024-07-09 ENCOUNTER — TREATMENT (OUTPATIENT)
Dept: PHYSICAL THERAPY | Facility: HOSPITAL | Age: 44
End: 2024-07-09
Payer: MEDICARE

## 2024-07-09 DIAGNOSIS — R26.9 GAIT ABNORMALITY: ICD-10-CM

## 2024-07-09 DIAGNOSIS — M16.12 PRIMARY OSTEOARTHRITIS OF LEFT HIP: ICD-10-CM

## 2024-07-09 DIAGNOSIS — R29.898 LEFT LEG WEAKNESS: ICD-10-CM

## 2024-07-09 PROCEDURE — 97110 THERAPEUTIC EXERCISES: CPT | Mod: GP | Performed by: PHYSICAL THERAPIST

## 2024-07-09 ASSESSMENT — PAIN - FUNCTIONAL ASSESSMENT: PAIN_FUNCTIONAL_ASSESSMENT: 0-10

## 2024-07-09 ASSESSMENT — PAIN SCALES - GENERAL: PAINLEVEL_OUTOF10: 0 - NO PAIN

## 2024-07-09 NOTE — PROGRESS NOTES
Physical Therapy    Physical Therapy Treatment    Patient Name: Chloe Oliveran  MRN: [id number]  : 1980   Ramya Rendon  Today's Date: 2024  Time Calculation  Start Time: 1045  Stop Time: 1115  Time Calculation (min): 30 min  PT Therapeutic Procedures Time Entry  Therapeutic Exercise Time Entry: 30           General     Visit #3     Current Problem  Problem List Items Addressed This Visit    None  Visit Diagnoses         Codes    Primary osteoarthritis of left hip     M16.12    Gait abnormality     R26.9    Left leg weakness     R29.898                 Subjective   Pt. Reports 1/10 pain during amb today. She has been able to amb without assistive device without increased pain but continues to use straight cane intermittently     Pt. Reports compliance with HEP.    Precautions  Precautions  Precautions Comment: Osteoporosis    Pain  Pain Assessment: 0-10  0-10 (Numeric) Pain Score: 0 - No pain    Objective        MMT 5/5 max  LEFT RIGHT   Hip Flexion  4+/5   4+/5        Hip Abduction  4+/5   4+/5   Hip Adduction  4+/5   4+/5   Knee Extension  4+/5   4+/5   Knee Flexion   4+/5   4+/5   Gait pattern shows decreased stance time on left LE.  This is equal when using straight cane vs. When amb without assistive device.  After gait training today, gait pattern was normalized    Outcome Measures:  Other Measures  Lower Extremity Funtional Score (LEFS): 67    Treatments:    Treatments: L AUGUSTO  EXERCISES Date 24 Date Date   VISIT # #2 # #   HEP            Nustep  10' @L3     Piriformis str            Shuttle  DLP  5B 10 x 2     Shuttle SLP  3B 2 x 10      Shuttle TR/HR  3B 2 x 10                  Mini squat  x 10      Mini lunge      3 way LE raise  ALEA  2 x 10            Gait assessment and assessment, Left LE isometric hip flexion and abd    X                     Patient Education:  Access Code: [email or web address]  URL: [email or web address]  Date: 2024  Prepared by: Tam  Tessean    Exercises  - Standing Hip Abduction  - 3 x daily - 7 x weekly - 2 sets - 10 reps - 2 seconds hold  - Standing Hip Extension  - 3 x daily - 7 x weekly - 2 sets - 10 reps - 2 seconds hold  - Mini Squat with Counter Support  - 3 x daily - 7 x weekly - 2 sets - 10 reps - 2 seconds hold  Assessment:   All goals achieved    Plan:  Discharge with HEP    Goals:  Active       PT Problem       PT Goal 1       Start:  06/14/24    Expected End:  07/29/24       Short tem goals to be achieve within 4 weeks:    1. Pt's left LE strength will improve to 4+/5 throughout to improve gait safety    Long term goals to be achieved within 6 weeks:  1.   Pt. will present with normalized gait pattern without assistive device for improved gait safety  4.   Pt's stated goal is to improve strength and gait

## 2024-07-12 ENCOUNTER — APPOINTMENT (OUTPATIENT)
Dept: RADIOLOGY | Facility: HOSPITAL | Age: 44
End: 2024-07-12
Payer: MEDICARE

## 2024-07-23 ENCOUNTER — APPOINTMENT (OUTPATIENT)
Dept: ORTHOPEDIC SURGERY | Facility: CLINIC | Age: 44
End: 2024-07-23
Payer: MEDICARE

## 2024-07-30 ENCOUNTER — APPOINTMENT (OUTPATIENT)
Dept: ORTHOPEDIC SURGERY | Facility: CLINIC | Age: 44
End: 2024-07-30
Payer: MEDICARE

## 2024-07-30 ENCOUNTER — APPOINTMENT (OUTPATIENT)
Dept: RADIOLOGY | Facility: CLINIC | Age: 44
End: 2024-07-30
Payer: MEDICARE

## 2024-08-06 ENCOUNTER — HOSPITAL ENCOUNTER (OUTPATIENT)
Dept: RADIOLOGY | Facility: CLINIC | Age: 44
Discharge: HOME | End: 2024-08-06
Payer: MEDICARE

## 2024-08-06 ENCOUNTER — APPOINTMENT (OUTPATIENT)
Dept: ORTHOPEDIC SURGERY | Facility: CLINIC | Age: 44
End: 2024-08-06
Payer: MEDICARE

## 2024-08-06 VITALS — WEIGHT: 247 LBS | HEIGHT: 68 IN | BODY MASS INDEX: 37.44 KG/M2

## 2024-08-06 DIAGNOSIS — M16.12 PRIMARY OSTEOARTHRITIS OF LEFT HIP: ICD-10-CM

## 2024-08-06 DIAGNOSIS — M16.12 PRIMARY OSTEOARTHRITIS OF LEFT HIP: Primary | ICD-10-CM

## 2024-08-06 PROCEDURE — 99024 POSTOP FOLLOW-UP VISIT: CPT | Performed by: ORTHOPAEDIC SURGERY

## 2024-08-06 PROCEDURE — 73502 X-RAY EXAM HIP UNI 2-3 VIEWS: CPT | Mod: LT

## 2024-08-06 PROCEDURE — 73502 X-RAY EXAM HIP UNI 2-3 VIEWS: CPT | Mod: LEFT SIDE | Performed by: RADIOLOGY

## 2024-08-06 PROCEDURE — 4004F PT TOBACCO SCREEN RCVD TLK: CPT | Performed by: ORTHOPAEDIC SURGERY

## 2024-08-06 PROCEDURE — 3008F BODY MASS INDEX DOCD: CPT | Performed by: ORTHOPAEDIC SURGERY

## 2024-08-06 ASSESSMENT — PAIN - FUNCTIONAL ASSESSMENT: PAIN_FUNCTIONAL_ASSESSMENT: NO/DENIES PAIN

## 2024-08-06 NOTE — PROGRESS NOTES
ORTHOPEDIC TOTAL JOINT  POST-OPERATIVE FOLLOW UP      ============================  IMPRESSION/PLAN:  ============================  44 y.o. female s/p Left Total Hip Replacement completed on 05/17/2024.    PLAN:  -Weightbearing: Weight bearing as tolerated  -DVT Prophylaxis: No longer needed  -Radiology Studies: Reviewed today  -Therapies: Continue regular exercise program  -Follow-up: 1 year surya of surgery with x-rays        Chloe Mclean presents today for follow up of joint replacement above. Pain controlled with current treatment plan. Patient has completed physical therapy.  They are currently ambulating with no aids. She is doing very well overall and has no concerns. XR done today.    Review of Systems:   Constitutional: See HPI for pain assessment, No significant weight loss, recent trauma. Denies fevers/chills  Cardiovascular: No chest pain, shortness of breath  Respiratory: No difficulty breathing, cough  Gastrointestinal: No nausea, vomiting, diarrhea, constipation  Musculoskeletal: Noted in HPI, no arthralgias   Integumentary: No rashes, easy bruising, redness   Neurological: no numbness or tingling in extremities, no gait disturbances     Patient Active Problem List   Diagnosis    Bipolar depression (Multi)    Closed displaced fracture of neck of right fifth metacarpal bone    Acetabulum fracture, left (Multi)    Fracture, hip (Multi)    Hemoptysis    Lupus (Multi)    MSSA (methicillin susceptible Staphylococcus aureus) infection    Necrosis of surgical wound (Multi)    Pelvic abscess in female    Right knee pain    Seborrheic dermatitis of scalp    Urinary retention    Weight gain    Wound infection after surgery    Medicare annual wellness visit, subsequent    Hyperglycemia    Anemia    Exertional shortness of breath    Vaping nicotine dependence, tobacco product    Advance directive discussed with patient    Abnormal computerized axial tomography of chest    Allergic rhinitis    HERBERT positive     Aspiration pneumonia (Multi)    Asthma ([facility])    Carpal tunnel syndrome of right wrist    Chronic schizophrenia (Multi)    Closed fracture of posterior wall of acetabulum (Multi)    Closed traumatic dislocation of hip (Multi)    Colitis    Facial cellulitis    Fibromyalgia    Insomnia due to medical condition    Lung mass    Moderate episode of recurrent major depressive disorder (Multi)    Obesity with body mass index 30 or greater    Obstructive sleep apnea syndrome    Primary osteoarthritis of right knee    Psoriatic arthropathy (Multi)    Pulmonary collapse    Rectal hemorrhage    Trichomonas vaginitis    Urinary tract infectious disease    Fracture of acetabulum (Multi)    Fracture of bone of hip (Multi)    Acute bronchitis    Folliculitis    Acute dermatitis    Bipolar disorder, most recent episode depressed (Multi)    Closed fracture of neck of fifth metacarpal bone    Dyspnea on exertion    Vitamin D deficiency    Lupus erythematosus    Methicillin susceptible Staphylococcus aureus infection    Abscess of female pelvis    Rash and other nonspecific skin eruption    Arthralgia of hip    Knee pain    Seborrhea capitis    Tobacco dependence due to cigarettes    Retention of urine    Local infection of wound    Postoperative wound infection    Wound dehiscence    Acute maxillary sinusitis    Obesity, morbid (Multi)    History of osteomyelitis       =================================  EXAM  =================================  GENERAL: A/Ox3, NAD. Appears healthy, well nourished  CARDIAC: regular rate  LUNGS: Breathing non-labored    MUSCULOSKELETAL:  Laterality: left Hip exam  - Strength: Abduction 5/5, Flexion 5/5  - Palpation: non TTP along surgical site  - Incision: No overlying, erythema, induration, or drainage. Incision healing well with no wound dehiscence  - EHL/PF/DF motor intact  - compartments soft, negative homans  - Gait: using no assistive device    NEUROVASCULAR:  - Neurovascular Status: sensation  intact to light touch distally  - Capillary refill brisk at extremities, Bilateral dorsalis pedis pulse 2+     IMAGING: Multiple use of left hip and pelvis reviewed which demonstrates no signs of loosening failure of left total of arthroplasty. X-rays were personally reviewed by me.  Radiology reports were reviewed by me as well, if available at the time.        Ramya Rendon DO  Attending Surgeon  Joint Replacement and Adult Reconstructive Surgery  [place], [place]

## 2024-08-16 ENCOUNTER — TELEPHONE (OUTPATIENT)
Dept: PRIMARY CARE | Facility: CLINIC | Age: 44
End: 2024-08-16
Payer: MEDICARE

## 2024-08-16 DIAGNOSIS — J06.9 ACUTE URI: Primary | ICD-10-CM

## 2024-08-16 RX ORDER — DOXYCYCLINE 100 MG/1
100 CAPSULE ORAL 2 TIMES DAILY
Qty: 20 CAPSULE | Refills: 0 | Status: SHIPPED | OUTPATIENT
Start: 2024-08-16 | End: 2024-08-26

## 2024-08-16 NOTE — TELEPHONE ENCOUNTER
Patient says she has a wet cough but no coughing up anything, sinus congestion  Says it started as what she thought was allergies and has not gotten any better   Not COVID tested  Symptoms started 1 1/2 weeks ago   Asking for a medication to be sent in   Has tried OTC medication , allergy relief and it is not helping     [facility] [place]

## 2024-09-18 ENCOUNTER — ANESTHESIA EVENT (OUTPATIENT)
Dept: GASTROENTEROLOGY | Facility: HOSPITAL | Age: 44
End: 2024-09-18
Payer: MEDICARE

## 2024-09-19 ENCOUNTER — HOSPITAL ENCOUNTER (OUTPATIENT)
Dept: GASTROENTEROLOGY | Facility: HOSPITAL | Age: 44
Discharge: HOME | End: 2024-09-19
Payer: MEDICARE

## 2024-09-19 ENCOUNTER — ANESTHESIA (OUTPATIENT)
Dept: GASTROENTEROLOGY | Facility: HOSPITAL | Age: 44
End: 2024-09-19
Payer: MEDICARE

## 2024-09-19 VITALS
OXYGEN SATURATION: 97 % | HEIGHT: 69 IN | DIASTOLIC BLOOD PRESSURE: 77 MMHG | RESPIRATION RATE: 16 BRPM | BODY MASS INDEX: 34.8 KG/M2 | HEART RATE: 77 BPM | TEMPERATURE: 97 F | WEIGHT: 235 LBS | SYSTOLIC BLOOD PRESSURE: 116 MMHG

## 2024-09-19 DIAGNOSIS — Z12.11 SCREEN FOR COLON CANCER: ICD-10-CM

## 2024-09-19 PROBLEM — Z98.890 PONV (POSTOPERATIVE NAUSEA AND VOMITING): Status: ACTIVE | Noted: 2024-09-19

## 2024-09-19 PROBLEM — R11.2 PONV (POSTOPERATIVE NAUSEA AND VOMITING): Status: ACTIVE | Noted: 2024-09-19

## 2024-09-19 PROCEDURE — 2500000004 HC RX 250 GENERAL PHARMACY W/ HCPCS (ALT 636 FOR OP/ED): Performed by: NURSE ANESTHETIST, CERTIFIED REGISTERED

## 2024-09-19 PROCEDURE — 3700000001 HC GENERAL ANESTHESIA TIME - INITIAL BASE CHARGE

## 2024-09-19 PROCEDURE — 7100000010 HC PHASE TWO TIME - EACH INCREMENTAL 1 MINUTE

## 2024-09-19 PROCEDURE — 3700000002 HC GENERAL ANESTHESIA TIME - EACH INCREMENTAL 1 MINUTE

## 2024-09-19 PROCEDURE — 45378 DIAGNOSTIC COLONOSCOPY: CPT | Performed by: SURGERY

## 2024-09-19 PROCEDURE — 7100000009 HC PHASE TWO TIME - INITIAL BASE CHARGE

## 2024-09-19 PROCEDURE — 2500000005 HC RX 250 GENERAL PHARMACY W/O HCPCS: Performed by: NURSE ANESTHETIST, CERTIFIED REGISTERED

## 2024-09-19 PROCEDURE — 2500000004 HC RX 250 GENERAL PHARMACY W/ HCPCS (ALT 636 FOR OP/ED): Performed by: SURGERY

## 2024-09-19 RX ORDER — SODIUM CHLORIDE 9 MG/ML
20 INJECTION, SOLUTION INTRAVENOUS CONTINUOUS
Status: DISCONTINUED | OUTPATIENT
Start: 2024-09-19 | End: 2024-09-20 | Stop reason: HOSPADM

## 2024-09-19 RX ORDER — PROPOFOL 10 MG/ML
INJECTION, EMULSION INTRAVENOUS AS NEEDED
Status: DISCONTINUED | OUTPATIENT
Start: 2024-09-19 | End: 2024-09-19

## 2024-09-19 RX ORDER — LIDOCAINE HYDROCHLORIDE 20 MG/ML
INJECTION, SOLUTION INFILTRATION; PERINEURAL AS NEEDED
Status: DISCONTINUED | OUTPATIENT
Start: 2024-09-19 | End: 2024-09-19

## 2024-09-19 RX ORDER — SODIUM CHLORIDE 9 MG/ML
20 INJECTION, SOLUTION INTRAVENOUS CONTINUOUS
Status: CANCELLED | OUTPATIENT
Start: 2024-09-19

## 2024-09-19 SDOH — HEALTH STABILITY: MENTAL HEALTH: CURRENT SMOKER: 1

## 2024-09-19 ASSESSMENT — PAIN - FUNCTIONAL ASSESSMENT
PAIN_FUNCTIONAL_ASSESSMENT: 0-10

## 2024-09-19 ASSESSMENT — PAIN SCALES - GENERAL
PAINLEVEL_OUTOF10: 0 - NO PAIN
PAIN_LEVEL: 1
PAINLEVEL_OUTOF10: 3
PAINLEVEL_OUTOF10: 0 - NO PAIN

## 2024-09-19 NOTE — ANESTHESIA POSTPROCEDURE EVALUATION
Patient: Chloe Mclean    Procedure Summary       Date: 09/19/24 Room / Location: [facility]    Anesthesia Start: 1147 Anesthesia Stop: 1215    Procedure: COLONOSCOPY Diagnosis: Screen for colon cancer    Scheduled Providers: Jose Angel Brewer MD Responsible Provider: ABIMAEL Silverio    Anesthesia Type: MAC ASA Status: 3            Anesthesia Type: MAC    Vitals Value Taken Time   /59 09/19/24 1215   Temp 36.4 °C (97.6 °F) 09/19/24 1213   Pulse 78 09/19/24 1213   Resp 16 09/19/24 1213   SpO2 96 % 09/19/24 1213       Anesthesia Post Evaluation    Patient location during evaluation: PACU  Patient participation: complete - patient participated  Level of consciousness: awake and alert  Pain score: 1  Pain management: adequate  Airway patency: patent  Cardiovascular status: acceptable and hemodynamically stable  Respiratory status: acceptable, spontaneous ventilation and room air  Hydration status: acceptable  Postoperative Nausea and Vomiting: none        There were no known notable events for this encounter.

## 2024-09-19 NOTE — ANESTHESIA PREPROCEDURE EVALUATION
Patient: Chloe Mclean    Procedure Information       Date/Time: 09/19/24 1215    Scheduled providers: Jose Angel Brewer MD    Procedure: COLONOSCOPY    Location:  [facility]            Relevant Problems   Anesthesia   (+) PONV (postoperative nausea and vomiting)      Pulmonary   (+) Aspiration pneumonia (Multi)   (+) Asthma ([facility]-H[facility])   (+) Dyspnea on exertion   (+) Exertional shortness of breath   (+) Obstructive sleep apnea syndrome      Neuro   (+) Bipolar depression (Multi)   (+) Bipolar disorder, most recent episode depressed (Multi)   (+) Carpal tunnel syndrome of right wrist   (+) Chronic schizophrenia (Multi)   (+) Moderate episode of recurrent major depressive disorder (Multi)      GI   (+) Rectal hemorrhage      /Renal   (+) Urinary tract infectious disease      Endocrine   (+) Obesity, morbid (Multi)      Hematology   (+) Anemia      Musculoskeletal   (+) Carpal tunnel syndrome of right wrist   (+) Fibromyalgia   (+) Lupus (Multi)   (+) Lupus erythematosus   (+) Primary osteoarthritis of right knee      HEENT   (+) Acute maxillary sinusitis      ID   (+) Abscess of female pelvis   (+) Local infection of wound   (+) MSSA (methicillin susceptible Staphylococcus aureus) infection   (+) Methicillin susceptible Staphylococcus aureus infection   (+) Pelvic abscess in female   (+) Postoperative wound infection   (+) Trichomonas vaginitis   (+) Urinary tract infectious disease   (+) Wound infection after surgery      Skin   (+) Rash and other nonspecific skin eruption       Clinical information reviewed:   Tobacco  Allergies  Meds   Med Hx  Surg Hx  OB Status  Fam Hx  Soc   Hx        NPO Detail:  NPO/Void Status  Date of Last Liquid: 09/18/24  Time of Last Liquid: 2230  Date of Last Solid: 09/17/24  Last Intake Type: Clear fluids         Physical Exam    Airway  Mallampati: II  TM distance: >3 FB  Neck ROM: full     Cardiovascular - normal exam     Dental   Comments: Edentulous on top,  missing teeth on bottom.   Pulmonary - normal exam     Abdominal   (+) obese             Anesthesia Plan    History of general anesthesia?: yes  History of complications of general anesthesia?: yes    ASA 3     MAC     The patient is a current smoker.  Patient was previously instructed to abstain from smoking on day of procedure.  Patient smoked on day of procedure.    intravenous induction   Anesthetic plan and risks discussed with patient.    Plan discussed with CRNA.

## 2024-09-19 NOTE — H&P
"History Of Present Illness  Chloe Mclean is a 44 y.o. female presenting with screen.     Past Medical History  She has a past medical history of Anxiety and depression, Asthma ([facility]), COPD (chronic obstructive pulmonary disease) (Multi), Fungal infection of lung (07/12/2021), H/O being hospitalized (07/2009), History of venous thromboembolism, Lung mass, Lupus (Multi), Mucus plugging of bronchi (07/2021), Obstructive sleep apnea syndrome (9/7/2023), Osteoarthritis, PONV (postoperative nausea and vomiting), Psoriatic arthritis (Multi), Schizophrenia (Multi), Seasonal allergies, and Snores (12/2023).    Surgical History  She has a past surgical history that includes Total knee arthroplasty (Right, 11/2012); Knee arthroscopy w/ debridement (Right, 12/08/2008); ORIF pelvic fracture (Left, 07/07/2021); Bronchoscopy (Left); Pelvis debridement (Left, 09/15/2021); Pelvis debridement (Left, 10/14/2021); Appendectomy; Hysterectomy; Cholecystectomy; and Total hip arthroplasty (Left, 05/17/2024).     Social History  She reports that she has quit smoking. Her smoking use included cigarettes. She has never used smokeless tobacco. She reports current drug use. Drug: Marijuana. She reports that she does not drink alcohol.    Family History  Family History   Problem Relation Name Age of Onset    Anesthesia problems Mother          delayed emergence        Allergies  Iodine and Latex    Review of Systems   All other systems reviewed and are negative.       Physical Exam  Vitals reviewed.   Cardiovascular:      Rate and Rhythm: Normal rate and regular rhythm.   Pulmonary:      Breath sounds: Normal breath sounds.   Skin:     General: Skin is warm and dry.   Neurological:      Mental Status: She is alert.   Psychiatric:         Mood and Affect: Mood normal.          Last Recorded Vitals  Blood pressure (!) 134/91, pulse 84, temperature 36.1 °C (97 °F), temperature source Temporal, resp. rate 16, height 1.753 m (5' 9\"), weight 107 " kg (235 lb), SpO2 97%.    Relevant Results               Assessment/Plan   Assessment & Plan  Screen for colon cancer      For colo       I spent 15 minutes in the professional and overall care of this patient.      Jose Angel Brewer MD

## 2024-10-18 ENCOUNTER — TELEPHONE (OUTPATIENT)
Dept: PRIMARY CARE | Facility: CLINIC | Age: 44
End: 2024-10-18
Payer: MEDICARE

## 2024-10-18 DIAGNOSIS — K04.7 DENTAL INFECTION: Primary | ICD-10-CM

## 2024-10-18 RX ORDER — AMOXICILLIN 875 MG/1
875 TABLET, FILM COATED ORAL 2 TIMES DAILY
Qty: 20 TABLET | Refills: 0 | Status: SHIPPED | OUTPATIENT
Start: 2024-10-18 | End: 2024-10-28

## 2024-11-18 ENCOUNTER — TELEPHONE (OUTPATIENT)
Dept: PRIMARY CARE | Facility: CLINIC | Age: 44
End: 2024-11-18
Payer: MEDICARE

## 2024-11-18 DIAGNOSIS — M25.471 ANKLE EDEMA, BILATERAL: ICD-10-CM

## 2024-11-18 DIAGNOSIS — M25.472 ANKLE EDEMA, BILATERAL: ICD-10-CM

## 2024-11-18 DIAGNOSIS — R00.0 RACING HEART BEAT: Primary | ICD-10-CM

## 2024-11-18 NOTE — TELEPHONE ENCOUNTER
Patient said that she has been having some swelling in her right ankle and for about a month she has been feeling like her heart is racing when she is doing basic physical activity, like walking up or down the stairs. Last night she was trying to have intercourse and she could not because he heart was racing so much. She said it was not a panic attack but something else.

## 2024-11-21 ENCOUNTER — OFFICE VISIT (OUTPATIENT)
Dept: CARDIOLOGY | Facility: HOSPITAL | Age: 44
End: 2024-11-21
Payer: MEDICARE

## 2024-11-21 VITALS
HEART RATE: 95 BPM | DIASTOLIC BLOOD PRESSURE: 82 MMHG | HEIGHT: 69 IN | BODY MASS INDEX: 38.06 KG/M2 | SYSTOLIC BLOOD PRESSURE: 116 MMHG | WEIGHT: 257 LBS

## 2024-11-21 DIAGNOSIS — R07.9 CHEST PAIN AT REST: Primary | ICD-10-CM

## 2024-11-21 DIAGNOSIS — R00.0 RACING HEART BEAT: ICD-10-CM

## 2024-11-21 DIAGNOSIS — M25.472 ANKLE EDEMA, BILATERAL: ICD-10-CM

## 2024-11-21 DIAGNOSIS — M25.471 ANKLE EDEMA, BILATERAL: ICD-10-CM

## 2024-11-21 PROBLEM — J44.9 CHRONIC OBSTRUCTIVE PULMONARY DISEASE, UNSPECIFIED: Status: ACTIVE | Noted: 2024-11-21

## 2024-11-21 PROCEDURE — 93005 ELECTROCARDIOGRAM TRACING: CPT | Performed by: INTERNAL MEDICINE

## 2024-11-21 PROCEDURE — 3008F BODY MASS INDEX DOCD: CPT | Performed by: INTERNAL MEDICINE

## 2024-11-21 PROCEDURE — 93010 ELECTROCARDIOGRAM REPORT: CPT | Performed by: INTERNAL MEDICINE

## 2024-11-21 PROCEDURE — 1036F TOBACCO NON-USER: CPT | Performed by: INTERNAL MEDICINE

## 2024-11-21 PROCEDURE — 99203 OFFICE O/P NEW LOW 30 MIN: CPT | Performed by: INTERNAL MEDICINE

## 2024-11-21 PROCEDURE — 99213 OFFICE O/P EST LOW 20 MIN: CPT | Performed by: INTERNAL MEDICINE

## 2024-11-21 RX ORDER — MIRTAZAPINE 15 MG/1
15 TABLET, FILM COATED ORAL NIGHTLY PRN
COMMUNITY
Start: 2024-08-22

## 2024-11-21 RX ORDER — ATROPINE SULFATE 0.1 MG/ML
.25-5 INJECTION INTRAVENOUS ONCE AS NEEDED
OUTPATIENT
Start: 2024-11-21

## 2024-11-21 RX ORDER — DOBUTAMINE HYDROCHLORIDE 100 MG/100ML
5-40 INJECTION INTRAVENOUS CONTINUOUS
OUTPATIENT
Start: 2024-11-21

## 2024-11-21 ASSESSMENT — ENCOUNTER SYMPTOMS
DEPRESSION: 0
OCCASIONAL FEELINGS OF UNSTEADINESS: 1
PALPITATIONS: 1
DYSPNEA ON EXERTION: 1
LOSS OF SENSATION IN FEET: 0

## 2024-11-21 NOTE — PATIENT INSTRUCTIONS
Dr. Villarreal has ordered a stress test to ensure your heart is getting adequate blood flow and there is no evidence of any blockages within the heart arteries.    Dr. Villarreal has ordered a heart monitor to assess your heart rhythm.  Followup with Dr. Villarreal after the above tests.    If you have any questions or cardiac concerns, please call our office at [phone].

## 2024-11-21 NOTE — LETTER
November 21, 2024     Gopi Heck, APRN-CNP  [address], [address]  [place] [id number]    Patient: Chloe Mclean   YOB: 1980   Date of Visit: 11/21/2024       Dear Dr. Gopi Heck, APRN-CNP:    Thank you for referring Chloe Mclean to me for evaluation. Below are my notes for this consultation.  If you have questions, please do not hesitate to call me. I look forward to following your patient along with you.       Sincerely,     Adan Villarreal MD      CC: No Recipients  ______________________________________________________________________________________    Referred by Dr. Rafaela Heck for Palpitations and LE Edema    Counseling:  The patient was counseled regarding diagnostic results, instructions for management, risk factor reductions, prognosis, patient and family education, impressions, risks and benefits of treatment options and importance of compliance with treatment.       History Of Present Illness:    Chloe Mclean is a 44 y.o. female patient whose PMH is significant for colitis, anemia, bipolar depression, schizophrenia, lupus, asthma, and DASHA. She presents today to establish cardiovascular care for the evaluation and management of palpitations and LE edema. The patient reports that during sexual activity one evening her heart began to race. After a period of rest and resumption of sexual activity, her heart promptly began to race again and she developed chest pain with radiation to her back. She reports frequent palpitations, exertional SOB, as well as LE edema that occurs mainly in the evening.     Past Surgical History:  She has a past surgical history that includes Total knee arthroplasty (Right, 11/2012); Knee arthroscopy w/ debridement (Right, 12/08/2008); ORIF pelvic fracture (Left, 07/07/2021); Bronchoscopy (Left); Pelvis debridement (Left, 09/15/2021); Pelvis debridement (Left, 10/14/2021); Appendectomy; Hysterectomy; Cholecystectomy; and  "Total hip arthroplasty (Left, 05/17/2024).      Social History:  She reports that she has quit smoking. Her smoking use included cigarettes. She has never used smokeless tobacco. She reports current drug use. Drug: Marijuana. She reports that she does not drink alcohol.    Family History:  Family History   Problem Relation Name Age of Onset   • Anesthesia problems Mother          delayed emergence        Allergies:  Iodine and Latex    Outpatient Medications:  Current Outpatient Medications   Medication Instructions   • acetaminophen (TYLENOL) 975 mg, oral, Every 8 hours PRN   • albuterol (ProAir HFA) 90 mcg/actuation inhaler 2 puffs, inhalation, Every 4 hours PRN   • albuterol 2.5 mg, nebulization, 4 times daily PRN   • mirtazapine (REMERON) 15 mg, Nightly PRN   • NON FORMULARY 2 each, Daily   • pantoprazole (PROTONIX) 40 mg, oral, Daily PRN, Do not crush, chew, or split.   • QUEtiapine (SEROquel) 400 mg tablet 1 tablet, Nightly   • QUEtiapine (SEROquel) 50 mg tablet 0.5 tablets, 3 times daily PRN   • Xarelto 10 mg, oral, Daily, To prevent blood clots        Last Recorded Vitals:  Vitals:    11/21/24 1310   BP: 116/82   BP Location: Left arm   Pulse: 95   Weight: 117 kg (257 lb)   Height: 1.753 m (5' 9\")       Review of Systems   Cardiovascular:  Positive for chest pain, dyspnea on exertion, leg swelling and palpitations.   All other systems reviewed and are negative.     Physical Exam:  Constitutional:       Appearance: Healthy appearance. Not in distress.   Neck:      Vascular: No JVR. JVD normal.   Pulmonary:      Effort: Pulmonary effort is normal.      Breath sounds: Normal breath sounds. No wheezing. No rhonchi. No rales.   Chest:      Chest wall: Not tender to palpatation.   Cardiovascular:      PMI at left midclavicular line. Normal rate. Regular rhythm. Normal S1. Normal S2.       Murmurs: There is no murmur.      No gallop.  No click. No rub.   Pulses:     Intact distal pulses.   Edema:     Peripheral " edema absent.   Abdominal:      General: Bowel sounds are normal.      Palpations: Abdomen is soft.      Tenderness: There is no abdominal tenderness.   Musculoskeletal: Normal range of motion.         General: No tenderness. Skin:     General: Skin is warm and dry.   Neurological:      General: No focal deficit present.      Mental Status: Alert and oriented to person, place and time.            Last Labs:  CBC -  Lab Results   Component Value Date    WBC 10.1 05/18/2024    HGB 10.1 (L) 05/18/2024    HCT 30.7 (L) 05/18/2024    MCV 88 05/18/2024     05/18/2024       CMP -  Lab Results   Component Value Date    CALCIUM 8.8 05/18/2024    PROT 7.3 05/08/2024    ALBUMIN 4.4 05/08/2024    AST 12 05/08/2024    ALT 8 05/08/2024    ALKPHOS 105 05/08/2024    BILITOT 0.5 05/08/2024       LIPID PANEL -   Lab Results   Component Value Date    CHOL 168 07/17/2023    TRIG 75 07/17/2023    HDL 42.1 07/17/2023    CHHDL 4.0 07/17/2023    LDLF 111 (H) 07/17/2023    VLDL 15 07/17/2023       RENAL FUNCTION PANEL -   Lab Results   Component Value Date    GLUCOSE 145 (H) 05/18/2024     05/18/2024    K 3.6 05/18/2024     05/18/2024    CO2 21 05/18/2024    ANIONGAP 15 05/18/2024    BUN 10 05/18/2024    CREATININE 0.57 05/18/2024    CALCIUM 8.8 05/18/2024    ALBUMIN 4.4 05/08/2024        Lab Results   Component Value Date    HGBA1C 5.2 05/08/2024       Last Cardiology Tests:  N/A    Lab review: I have personally reviewed the laboratory result(s).    Assessment/Plan  1) Palpitations, LE Edema, Exertional SOB, CP  Symptoms first noticed during sexual activity   Reports frequent palpitations   Reports exertional SOB  Reports occasional chest pain with radiation into back  Reports LE edema - occurs mainly in the evening   EKG shows NSR with no acute changes  Check Dobutamine stress echo - unable to tolerate treadmill stress s/t symptoms and COPD  Check 14-day Holter  F/U after testing      Scribe Attestation  By signing my  name below, I, Mari Mancia   attest that this documentation has been prepared under the direction and in the presence of Adan Villarreal MD.

## 2024-11-21 NOTE — PROGRESS NOTES
Referred by Dr. Rafaela Heck for Palpitations and LE Edema    Counseling:  The patient was counseled regarding diagnostic results, instructions for management, risk factor reductions, prognosis, patient and family education, impressions, risks and benefits of treatment options and importance of compliance with treatment.       History Of Present Illness:    Chloe Mclean is a 44 y.o. female patient whose PMH is significant for colitis, anemia, bipolar depression, schizophrenia, lupus, asthma, and DASHA. She presents today to establish cardiovascular care for the evaluation and management of palpitations and LE edema. The patient reports that during sexual activity one evening her heart began to race. After a period of rest and resumption of sexual activity, her heart promptly began to race again and she developed chest pain with radiation to her back. She reports frequent palpitations, exertional SOB, as well as LE edema that occurs mainly in the evening.     Past Surgical History:  She has a past surgical history that includes Total knee arthroplasty (Right, 11/2012); Knee arthroscopy w/ debridement (Right, 12/08/2008); ORIF pelvic fracture (Left, 07/07/2021); Bronchoscopy (Left); Pelvis debridement (Left, 09/15/2021); Pelvis debridement (Left, 10/14/2021); Appendectomy; Hysterectomy; Cholecystectomy; and Total hip arthroplasty (Left, 05/17/2024).      Social History:  She reports that she has quit smoking. Her smoking use included cigarettes. She has never used smokeless tobacco. She reports current drug use. Drug: Marijuana. She reports that she does not drink alcohol.    Family History:  Family History   Problem Relation Name Age of Onset    Anesthesia problems Mother          delayed emergence        Allergies:  Iodine and Latex    Outpatient Medications:  Current Outpatient Medications   Medication Instructions    acetaminophen (TYLENOL) 975 mg, oral, Every 8 hours PRN    albuterol (ProAir HFA) 90 mcg/actuation  "inhaler 2 puffs, inhalation, Every 4 hours PRN    albuterol 2.5 mg, nebulization, 4 times daily PRN    mirtazapine (REMERON) 15 mg, Nightly PRN    NON FORMULARY 2 each, Daily    pantoprazole (PROTONIX) 40 mg, oral, Daily PRN, Do not crush, chew, or split.    QUEtiapine (SEROquel) 400 mg tablet 1 tablet, Nightly    QUEtiapine (SEROquel) 50 mg tablet 0.5 tablets, 3 times daily PRN    Xarelto 10 mg, oral, Daily, To prevent blood clots        Last Recorded Vitals:  Vitals:    11/21/24 1310   BP: 116/82   BP Location: Left arm   Pulse: 95   Weight: 117 kg (257 lb)   Height: 1.753 m (5' 9\")       Review of Systems   Cardiovascular:  Positive for chest pain, dyspnea on exertion, leg swelling and palpitations.   All other systems reviewed and are negative.     Physical Exam:  Constitutional:       Appearance: Healthy appearance. Not in distress.   Neck:      Vascular: No JVR. JVD normal.   Pulmonary:      Effort: Pulmonary effort is normal.      Breath sounds: Normal breath sounds. No wheezing. No rhonchi. No rales.   Chest:      Chest wall: Not tender to palpatation.   Cardiovascular:      PMI at left midclavicular line. Normal rate. Regular rhythm. Normal S1. Normal S2.       Murmurs: There is no murmur.      No gallop.  No click. No rub.   Pulses:     Intact distal pulses.   Edema:     Peripheral edema absent.   Abdominal:      General: Bowel sounds are normal.      Palpations: Abdomen is soft.      Tenderness: There is no abdominal tenderness.   Musculoskeletal: Normal range of motion.         General: No tenderness. Skin:     General: Skin is warm and dry.   Neurological:      General: No focal deficit present.      Mental Status: Alert and oriented to person, place and time.            Last Labs:  CBC -  Lab Results   Component Value Date    WBC 10.1 05/18/2024    HGB 10.1 (L) 05/18/2024    HCT 30.7 (L) 05/18/2024    MCV 88 05/18/2024     05/18/2024       CMP -  Lab Results   Component Value Date    CALCIUM 8.8 " 05/18/2024    PROT 7.3 05/08/2024    ALBUMIN 4.4 05/08/2024    AST 12 05/08/2024    ALT 8 05/08/2024    ALKPHOS 105 05/08/2024    BILITOT 0.5 05/08/2024       LIPID PANEL -   Lab Results   Component Value Date    CHOL 168 07/17/2023    TRIG 75 07/17/2023    HDL 42.1 07/17/2023    CHHDL 4.0 07/17/2023    LDLF 111 (H) 07/17/2023    VLDL 15 07/17/2023       RENAL FUNCTION PANEL -   Lab Results   Component Value Date    GLUCOSE 145 (H) 05/18/2024     05/18/2024    K 3.6 05/18/2024     05/18/2024    CO2 21 05/18/2024    ANIONGAP 15 05/18/2024    BUN 10 05/18/2024    CREATININE 0.57 05/18/2024    CALCIUM 8.8 05/18/2024    ALBUMIN 4.4 05/08/2024        Lab Results   Component Value Date    HGBA1C 5.2 05/08/2024       Last Cardiology Tests:  N/A    Lab review: I have personally reviewed the laboratory result(s).    Assessment/Plan   1) Palpitations, LE Edema, Exertional SOB, CP  Symptoms first noticed during sexual activity   Reports frequent palpitations   Reports exertional SOB  Reports occasional chest pain with radiation into back  Reports LE edema - occurs mainly in the evening   EKG shows NSR with no acute changes  Check Dobutamine stress echo - unable to tolerate treadmill stress s/t symptoms and COPD  Check 14-day Holter  F/U after testing      Scribe Attestation  By signing my name below, I, Mari Mancia   attest that this documentation has been prepared under the direction and in the presence of Adan Villarreal MD.

## 2024-11-22 ENCOUNTER — ANCILLARY PROCEDURE (OUTPATIENT)
Dept: CARDIOLOGY | Facility: HOSPITAL | Age: 44
End: 2024-11-22
Payer: MEDICARE

## 2024-11-22 DIAGNOSIS — M25.472 ANKLE EDEMA, BILATERAL: ICD-10-CM

## 2024-11-22 DIAGNOSIS — R07.9 CHEST PAIN AT REST: ICD-10-CM

## 2024-11-22 DIAGNOSIS — M25.471 ANKLE EDEMA, BILATERAL: ICD-10-CM

## 2024-11-22 DIAGNOSIS — R00.0 RACING HEART BEAT: ICD-10-CM

## 2024-11-22 PROCEDURE — 93246 EXT ECG>7D<15D RECORDING: CPT

## 2024-11-22 NOTE — PROGRESS NOTES
Shelley put Holter on the patient on 11/21/24 for 14 days    Patient was told what can and can not be done while wearing it     Patient showed understanding

## 2024-12-06 ENCOUNTER — HOSPITAL ENCOUNTER (OUTPATIENT)
Dept: CARDIOLOGY | Facility: HOSPITAL | Age: 44
Discharge: HOME | End: 2024-12-06
Payer: MEDICARE

## 2024-12-06 DIAGNOSIS — M25.472 ANKLE EDEMA, BILATERAL: ICD-10-CM

## 2024-12-06 DIAGNOSIS — R07.9 CHEST PAIN AT REST: ICD-10-CM

## 2024-12-06 DIAGNOSIS — R00.0 RACING HEART BEAT: ICD-10-CM

## 2024-12-06 DIAGNOSIS — M25.471 ANKLE EDEMA, BILATERAL: ICD-10-CM

## 2024-12-06 PROCEDURE — 93350 STRESS TTE ONLY: CPT | Performed by: STUDENT IN AN ORGANIZED HEALTH CARE EDUCATION/TRAINING PROGRAM

## 2024-12-06 PROCEDURE — 93017 CV STRESS TEST TRACING ONLY: CPT

## 2024-12-06 PROCEDURE — 93018 CV STRESS TEST I&R ONLY: CPT | Performed by: STUDENT IN AN ORGANIZED HEALTH CARE EDUCATION/TRAINING PROGRAM

## 2024-12-06 PROCEDURE — 93016 CV STRESS TEST SUPVJ ONLY: CPT | Performed by: STUDENT IN AN ORGANIZED HEALTH CARE EDUCATION/TRAINING PROGRAM

## 2024-12-06 PROCEDURE — 2500000004 HC RX 250 GENERAL PHARMACY W/ HCPCS (ALT 636 FOR OP/ED): Performed by: STUDENT IN AN ORGANIZED HEALTH CARE EDUCATION/TRAINING PROGRAM

## 2024-12-06 PROCEDURE — 2500000004 HC RX 250 GENERAL PHARMACY W/ HCPCS (ALT 636 FOR OP/ED): Performed by: INTERNAL MEDICINE

## 2024-12-06 RX ORDER — METOPROLOL TARTRATE 1 MG/ML
5 INJECTION, SOLUTION INTRAVENOUS ONCE
Status: COMPLETED | OUTPATIENT
Start: 2024-12-06 | End: 2024-12-06

## 2024-12-06 RX ORDER — ATROPINE SULFATE 0.1 MG/ML
.25-5 INJECTION INTRAVENOUS ONCE AS NEEDED
Status: DISCONTINUED | OUTPATIENT
Start: 2024-12-06 | End: 2024-12-07 | Stop reason: HOSPADM

## 2024-12-06 RX ORDER — DOBUTAMINE HYDROCHLORIDE 100 MG/100ML
5-40 INJECTION INTRAVENOUS CONTINUOUS
Status: DISCONTINUED | OUTPATIENT
Start: 2024-12-06 | End: 2024-12-07 | Stop reason: HOSPADM

## 2024-12-26 ENCOUNTER — TELEPHONE (OUTPATIENT)
Dept: PRIMARY CARE | Facility: CLINIC | Age: 44
End: 2024-12-26
Payer: MEDICARE

## 2024-12-26 NOTE — TELEPHONE ENCOUNTER
Recommend COVID testing.     Cool mist humidifier/nasal saline/OTC decongestant/rest/fluids/acetaminophen for fever or body ache.   Red flag symptoms such as increased WOB, lethargy, inability to tolerate PO intake, signs of dehydration, and fever not responsive to tylenol require ed follow up.

## 2024-12-26 NOTE — TELEPHONE ENCOUNTER
Patient called in sick, fever, chest cold, vomiting and she has tried some nausea meds and Tylenol. This started on xmas eve afternoon.    Pharm: [facility] in [place]

## 2024-12-27 ENCOUNTER — TELEPHONE (OUTPATIENT)
Dept: PRIMARY CARE | Facility: CLINIC | Age: 44
End: 2024-12-27
Payer: MEDICARE

## 2024-12-27 DIAGNOSIS — R11.0 NAUSEA: Primary | ICD-10-CM

## 2024-12-27 RX ORDER — ONDANSETRON 4 MG/1
4 TABLET, ORALLY DISINTEGRATING ORAL EVERY 8 HOURS PRN
Qty: 20 TABLET | Refills: 0 | Status: SHIPPED | OUTPATIENT
Start: 2024-12-27 | End: 2025-01-03

## 2024-12-27 NOTE — TELEPHONE ENCOUNTER
Patient calling for something to help with her nasus, low grade fever, no flu test, negative to covid, can't eat at all.    [facility] in [place]

## 2025-01-07 ENCOUNTER — APPOINTMENT (OUTPATIENT)
Dept: PRIMARY CARE | Facility: CLINIC | Age: 45
End: 2025-01-07
Payer: MEDICARE

## 2025-01-08 ASSESSMENT — ENCOUNTER SYMPTOMS
PALPITATIONS: 1
DYSPNEA ON EXERTION: 1

## 2025-01-08 NOTE — PROGRESS NOTES
Counseling:  The patient was counseled regarding diagnostic results, instructions for management, risk factor reductions, prognosis, patient and family education, impressions, risks and benefits of treatment options and importance of compliance with treatment.       Chief Complaint:  The patient presents today for 6-week followup of palpitations, LE edema, exertional SOB and chest pain s/p stress echo and Holter Monitor.     History Of Present Illness:    Chloe Mclean is a 44 y.o. female patient who presents today for 6-week followup of palpitations, LE edema, exertional SOB and chest pain s/p stress echo and Holter Monitor. Her PMH is significant for colitis, anemia, bipolar depression, schizophrenia, lupus, asthma, and DASHA. Holter monitoring performed from 11/21/2024 to 12/05/2024 revealed first degree AV block, second degree AV block-Mobitz I (Wenckebach) and PVCs. Dobutamine stress echo performed 12/06/2024 was negative for ischemia, with low normal LV systolic function. The patient reports having occasional palpitations while wearing the monitor.     Past Surgical History:  She has a past surgical history that includes Total knee arthroplasty (Right, 11/2012); Knee arthroscopy w/ debridement (Right, 12/08/2008); ORIF pelvic fracture (Left, 07/07/2021); Bronchoscopy (Left); Pelvis debridement (Left, 09/15/2021); Pelvis debridement (Left, 10/14/2021); Appendectomy; Hysterectomy; Cholecystectomy; and Total hip arthroplasty (Left, 05/17/2024).      Social History:  She reports that she has quit smoking. Her smoking use included cigarettes. She has never used smokeless tobacco. She reports current drug use. Drug: Marijuana. She reports that she does not drink alcohol.    Family History:  Family History   Problem Relation Name Age of Onset    Anesthesia problems Mother          delayed emergence        Allergies:  Iodine and Latex    Outpatient Medications:  Current Outpatient Medications   Medication Instructions  "   acetaminophen (TYLENOL) 975 mg, oral, Every 8 hours PRN    albuterol (ProAir HFA) 90 mcg/actuation inhaler 2 puffs, inhalation, Every 4 hours PRN    albuterol 2.5 mg, nebulization, 4 times daily PRN    mirtazapine (REMERON) 15 mg, Nightly PRN    NON FORMULARY 2 each, Daily    pantoprazole (PROTONIX) 40 mg, oral, Daily PRN, Do not crush, chew, or split.    QUEtiapine (SEROquel) 400 mg tablet 1 tablet, Nightly    QUEtiapine (SEROquel) 50 mg tablet 0.5 tablets, 3 times daily PRN    QUEtiapine (SEROQUEL) 25 mg    Xarelto 10 mg, oral, Daily, To prevent blood clots        Last Recorded Vitals:  Vitals:    01/13/25 1213   BP: 132/74   BP Location: Left arm   Pulse: 100   SpO2: 98%   Weight: 117 kg (259 lb)   Height: 1.753 m (5' 9\")         Review of Systems   Cardiovascular:  Positive for chest pain, dyspnea on exertion, leg swelling and palpitations.   All other systems reviewed and are negative.     Physical Exam:  Constitutional:       Appearance: Healthy appearance. Not in distress.   Neck:      Vascular: No JVR. JVD normal.   Pulmonary:      Effort: Pulmonary effort is normal.      Breath sounds: Normal breath sounds. No wheezing. No rhonchi. No rales.   Chest:      Chest wall: Not tender to palpatation.   Cardiovascular:      PMI at left midclavicular line. Normal rate. Regular rhythm. Normal S1. Normal S2.       Murmurs: There is no murmur.      No gallop.  No click. No rub.   Pulses:     Intact distal pulses.   Edema:     Peripheral edema absent.   Abdominal:      General: Bowel sounds are normal.      Palpations: Abdomen is soft.      Tenderness: There is no abdominal tenderness.   Musculoskeletal: Normal range of motion.         General: No tenderness. Skin:     General: Skin is warm and dry.   Neurological:      General: No focal deficit present.      Mental Status: Alert and oriented to person, place and time.            Last Labs:  CBC -  Lab Results   Component Value Date    WBC 10.1 05/18/2024    HGB 10.1 " (L) 05/18/2024    HCT 30.7 (L) 05/18/2024    MCV 88 05/18/2024     05/18/2024       CMP -  Lab Results   Component Value Date    CALCIUM 8.8 05/18/2024    PROT 7.3 05/08/2024    ALBUMIN 4.4 05/08/2024    AST 12 05/08/2024    ALT 8 05/08/2024    ALKPHOS 105 05/08/2024    BILITOT 0.5 05/08/2024       LIPID PANEL -   Lab Results   Component Value Date    CHOL 168 07/17/2023    TRIG 75 07/17/2023    HDL 42.1 07/17/2023    CHHDL 4.0 07/17/2023    LDLF 111 (H) 07/17/2023    VLDL 15 07/17/2023       RENAL FUNCTION PANEL -   Lab Results   Component Value Date    GLUCOSE 145 (H) 05/18/2024     05/18/2024    K 3.6 05/18/2024     05/18/2024    CO2 21 05/18/2024    ANIONGAP 15 05/18/2024    BUN 10 05/18/2024    CREATININE 0.57 05/18/2024    CALCIUM 8.8 05/18/2024    ALBUMIN 4.4 05/08/2024        Lab Results   Component Value Date    HGBA1C 5.2 05/08/2024       Last Cardiology Tests:  12/06/2024 - Dobutamine Stress Echo  1. Adequate level of stress achieved.  2. Low normal global left ventricular systolic function.  3. No clinical, electrocardiographic or echocardiographic evidence for ischemia at maximal infusion.    11/21/2024 to 12/05/2024 - Holter Monitor  1. Predominant underlying rhythm was sinus rhythm; min HR 27 bpm, max  bpm, avg HR 81 bpm.  2. First Degree AV block was present.  3. Second degree AV block-Mobitz I (Wenckebach) was present.  4. Isolated SVEs were rare, SVE couplets were rare, and no SVE triplets were present.  5. Isolated VEs were rare, VE couplets were rare, and no VE triplets were present.     Diagnostic review: I have personally reviewed the result(s) of the Holter Monitor and Dobutamine Stress Echo.     Assessment/Plan   1) Palpitations, LE Edema, Exertional SOB, CP  Symptoms first noticed during sexual activity   Reports frequent palpitations   Reports exertional SOB  Reports occasional chest pain with radiation into back  Reports LE edema - occurs mainly in the evening    EKG shows NSR with no acute changes  Holter with first degree AV block, second degree AV block-Mobitz I (Wenckebach), PVCs  Dobutamine stress echo 12/06/2024 negative for ischemia, with low normal LV systolic function.  Reports having occasional palpitations while wearing the monitor  LE edema likely s/t venous insufficiency   Recommend compression stockings  F/U as needed      Scribe Attestation  By signing my name below, I, Uma Remy, Scribe   attest that this documentation has been prepared under the direction and in the presence of Adan Villarreal MD.

## 2025-01-13 ENCOUNTER — OFFICE VISIT (OUTPATIENT)
Dept: CARDIOLOGY | Facility: HOSPITAL | Age: 45
End: 2025-01-13
Payer: MEDICARE

## 2025-01-13 VITALS
BODY MASS INDEX: 38.36 KG/M2 | DIASTOLIC BLOOD PRESSURE: 74 MMHG | SYSTOLIC BLOOD PRESSURE: 132 MMHG | HEIGHT: 69 IN | OXYGEN SATURATION: 98 % | HEART RATE: 100 BPM | WEIGHT: 259 LBS

## 2025-01-13 DIAGNOSIS — R07.9 CHEST PAIN AT REST: ICD-10-CM

## 2025-01-13 DIAGNOSIS — M25.472 ANKLE EDEMA, BILATERAL: ICD-10-CM

## 2025-01-13 DIAGNOSIS — R00.0 RACING HEART BEAT: ICD-10-CM

## 2025-01-13 DIAGNOSIS — M25.471 ANKLE EDEMA, BILATERAL: ICD-10-CM

## 2025-01-13 PROCEDURE — 3008F BODY MASS INDEX DOCD: CPT | Performed by: INTERNAL MEDICINE

## 2025-01-13 PROCEDURE — 99213 OFFICE O/P EST LOW 20 MIN: CPT | Performed by: INTERNAL MEDICINE

## 2025-01-13 RX ORDER — QUETIAPINE FUMARATE 25 MG/1
25 TABLET, FILM COATED ORAL
COMMUNITY
Start: 2025-01-09

## 2025-01-13 NOTE — PATIENT INSTRUCTIONS
For the swelling in your legs, Dr. Villarreal has recommended wearing knee high compression stockings throughout the day; put on first thing in the morning, take off at night before bed.  Followup with Dr. Villarreal on an as needed basis.    If you have any questions or cardiac concerns, please call our office at [phone].

## 2025-01-31 ENCOUNTER — APPOINTMENT (OUTPATIENT)
Dept: RADIOLOGY | Facility: HOSPITAL | Age: 45
End: 2025-01-31
Payer: MEDICARE

## 2025-02-07 ENCOUNTER — HOSPITAL ENCOUNTER (OUTPATIENT)
Dept: RADIOLOGY | Facility: HOSPITAL | Age: 45
Discharge: HOME | End: 2025-02-07
Payer: MEDICARE

## 2025-02-07 ENCOUNTER — TELEPHONE (OUTPATIENT)
Dept: PRIMARY CARE | Facility: CLINIC | Age: 45
End: 2025-02-07
Payer: MEDICARE

## 2025-02-07 VITALS — WEIGHT: 259 LBS | HEIGHT: 69 IN | BODY MASS INDEX: 38.36 KG/M2

## 2025-02-07 DIAGNOSIS — Z12.31 ENCOUNTER FOR SCREENING MAMMOGRAM FOR MALIGNANT NEOPLASM OF BREAST: ICD-10-CM

## 2025-02-07 PROCEDURE — 77063 BREAST TOMOSYNTHESIS BI: CPT

## 2025-02-07 PROCEDURE — 77067 SCR MAMMO BI INCL CAD: CPT

## 2025-02-07 NOTE — TELEPHONE ENCOUNTER
----- Message from Gopi Heck sent at 2/7/2025  4:29 PM EST -----  Please tell patient that her mammogram is normal.  I recommend she repeats screening mammogram in a year.  ----- Message -----  From: Mandi, Radiology Results In  Sent: 2/7/2025   3:34 PM EST  To: Gopi Heck, JAMSHID-CNP

## 2025-03-18 ENCOUNTER — APPOINTMENT (OUTPATIENT)
Dept: ORTHOPEDIC SURGERY | Facility: CLINIC | Age: 45
End: 2025-03-18
Payer: MEDICARE

## 2025-03-18 ENCOUNTER — HOSPITAL ENCOUNTER (OUTPATIENT)
Dept: RADIOLOGY | Facility: CLINIC | Age: 45
Discharge: HOME | End: 2025-03-18
Payer: MEDICARE

## 2025-03-18 VITALS — WEIGHT: 256 LBS | HEIGHT: 68 IN | BODY MASS INDEX: 38.8 KG/M2

## 2025-03-18 DIAGNOSIS — T84.84XA PAIN DUE TO TOTAL RIGHT KNEE REPLACEMENT, INITIAL ENCOUNTER (CMS-HCC): ICD-10-CM

## 2025-03-18 DIAGNOSIS — M25.561 RIGHT KNEE PAIN, UNSPECIFIED CHRONICITY: ICD-10-CM

## 2025-03-18 DIAGNOSIS — Z96.651 PAIN DUE TO TOTAL RIGHT KNEE REPLACEMENT, INITIAL ENCOUNTER (CMS-HCC): ICD-10-CM

## 2025-03-18 PROCEDURE — 3008F BODY MASS INDEX DOCD: CPT | Performed by: ORTHOPAEDIC SURGERY

## 2025-03-18 PROCEDURE — 73564 X-RAY EXAM KNEE 4 OR MORE: CPT | Mod: RT

## 2025-03-18 PROCEDURE — 99214 OFFICE O/P EST MOD 30 MIN: CPT | Performed by: ORTHOPAEDIC SURGERY

## 2025-03-18 PROCEDURE — 1036F TOBACCO NON-USER: CPT | Performed by: ORTHOPAEDIC SURGERY

## 2025-03-18 ASSESSMENT — PAIN - FUNCTIONAL ASSESSMENT: PAIN_FUNCTIONAL_ASSESSMENT: NO/DENIES PAIN

## 2025-03-18 NOTE — PROGRESS NOTES
PRIMARY CARE PHYSICIAN: Gopi Heck, APRN-CNP  REFERRING PROVIDER: No referring provider defined for this encounter.     CONSULT ORTHOPAEDIC: Knee Evaluation        ASSESSMENT & PLAN    IMPRESSION:   History of a Right Total Knee Arthroplasty done 12-13 years ago    PLAN:   Discussed the patient findings above and reviewed her current x-rays with her.  At minimum she does have some lucencies noted on the tibial baseplate that is suspicious for septic versus aseptic loosening.  While she does not present like any sort of prosthetic joint infection I would like to order an ESR and CRP for evaluation and for completeness sake.  We did review that with her history of rheumatological issues that she may have some baseline elevated inflammatory markers but as long as these are relative to her normal numbers will not proceed with an aspiration.  If her inflammatory markers are normal we will likely get a bone scan for further evaluation to rule out loosening as a source of her current pain.      SUBJECTIVE  CHIEF COMPLAINT:   Chief Complaint   Patient presents with    Right Knee - Pain        HPI: Chloe Mclean is a 45 y.o. patient. Chloe Mclean has had progressive problems with their right knee over the past 1 month. They do not report any trauma. They do not report any constant or progressive numbness or tingling in their legs. Their symptoms are interfering with activities which include localized pain on the side of the knee and inferior to the patella, crepitus, instability, and swelling. XR done today.     FUNCTIONAL STATUS: occasionally limited.  AMBULATORY STATUS:  independent  PREVIOUS TREATMENTS: NSAIDS Advil still taking with mild improvement and Aleve with good improvement, still taking  Therapy PT after Sx  completed  Surgery R TKA done 12-13 years ago with improvement until 1 month ago  HISTORY OF SURGERY ON AFFECTED KNEE(S): Yes       REVIEW OF SYSTEMS  Constitutional: See HPI for pain assessment, No  significant weight loss, recent trauma  Cardiovascular: No chest pain, shortness of breath  Respiratory: No difficulty breathing, cough  Gastrointestinal: No nausea, vomiting, diarrhea, constipation  Musculoskeletal: Noted in HPI, positive for pain, restricted motion, stiffness  Integumentary: No rashes, easy bruising, redness   Neurological: no numbness or tingling in extremities, no gait disturbances   Psychiatric: No mood changes, memory changes, social issues  Heme/Lymph: no excessive swelling, easy bruising, excessive bleeding  ENT: No hearing changes  Eyes: No vision changes    Past Medical History:   Diagnosis Date    Anxiety and depression     h/o inpatient evaluation for SI in 2016    Asthma     COPD (chronic obstructive pulmonary disease) (Multi)     Fungal infection of lung 07/12/2021    + Carri dubliniensis bronchial washing    H/O being hospitalized 07/2009    + Clostridium difficile toxin and salmonella diarrhea    History of venous thromboembolism     after surgery    Lung mass     6/2010 lung biopsy organizing pneumonia    Lupus     Mucus plugging of bronchi 07/2021    Post-op ORIF acetabulum requiring increased O2 support. CXR found left lung collapse, requiring bronchoscopy in order to wash out and re-expand the lung    Obstructive sleep apnea syndrome 9/7/2023    Osteoarthritis     PONV (postoperative nausea and vomiting)     Psoriatic arthritis (Multi)     Cannot be on biologic due to prior fungal pulmonary infection    Schizophrenia     Seasonal allergies     Snores 12/2023    Home sleep study in 12/2023 did not show significant sleep apnea or oxygen desaturations. Mild snoring is noted.        Allergies   Allergen Reactions    Iodine Rash    Latex Rash        Past Surgical History:   Procedure Laterality Date    APPENDECTOMY      BRONCHOSCOPY Left     A mucous plug was found in the left mainstem bronchus    CHOLECYSTECTOMY      HYSTERECTOMY      KNEE ARTHROSCOPY W/ DEBRIDEMENT Right  12/08/2008    of medial meniscus, the lateral meniscus; chondroplasty of the lateral tibial condyle,the medial femoral condyle, and large patellar plica    ORIF PELVIC FRACTURE Left 07/07/2021    ORIF left anterior Column and wall acetabulum fracture c/b Wound dehiscence requiring debridement    PELVIS DEBRIDEMENT Left 09/15/2021    Irrigation and excisional debridement of skin, subcutaneous tissue, fat,fascia, of postoperative surgical wound infection [id number]: Saucerization of left iliac crest/pelvis bone. Left hip arthrocentesis. Drainage of left pelvic abscess. Application of negative pressure wound VAC. Secondary complex wound closure    PELVIS DEBRIDEMENT Left 10/14/2021    Irrigation and excisional debridement of pelvic abscess. Wound closure of left pelvis. Debridement/saucerization of iliac wing    TOTAL HIP ARTHROPLASTY Left 05/17/2024    TOTAL KNEE ARTHROPLASTY Right 11/2012        Family History   Problem Relation Name Age of Onset    Anesthesia problems Mother          delayed emergence        Social History     Socioeconomic History    Marital status: Single     Spouse name: Not on file    Number of children: Not on file    Years of education: Not on file    Highest education level: Not on file   Occupational History    Not on file   Tobacco Use    Smoking status: Former     Types: Cigarettes    Smokeless tobacco: Never    Tobacco comments:     Vapping nicotine containing products    Vaping Use    Vaping status: Every Day    Substances: Nicotine    Devices: Disposable   Substance and Sexual Activity    Alcohol use: Never    Drug use: Yes     Types: Marijuana     Comment: occasional    Sexual activity: Not on file   Other Topics Concern    Not on file   Social History Narrative    Not on file     Social Drivers of Health     Financial Resource Strain: Low Risk  (5/17/2024)    Overall Financial Resource Strain (CARDIA)     Difficulty of Paying Living Expenses: Not hard at all   Food Insecurity:  Not on file   Transportation Needs: No Transportation Needs (6/11/2024)    OASIS : Transportation     Lack of Transportation (Medical): No     Lack of Transportation (Non-Medical): No     Patient Unable or Declines to Respond: No   Physical Activity: Not on file   Stress: Not on file   Social Connections: Feeling Socially Integrated (6/11/2024)    OASIS : Social Isolation     Frequency of experiencing loneliness or isolation: Never   Intimate Partner Violence: Not on file   Housing Stability: Low Risk  (5/17/2024)    Housing Stability Vital Sign     Unable to Pay for Housing in the Last Year: No     Number of Places Lived in the Last Year: 1     Unstable Housing in the Last Year: No        CURRENT MEDICATIONS:   Current Outpatient Medications   Medication Sig Dispense Refill    acetaminophen (Tylenol) 325 mg tablet Take 3 tablets (975 mg) by mouth every 8 hours if needed for mild pain (1 - 3). (Patient not taking: Reported on 3/18/2025) 90 tablet 1    albuterol (ProAir HFA) 90 mcg/actuation inhaler Inhale 2 puffs every 4 hours if needed for wheezing. 8.5 g 11    albuterol 2.5 mg /3 mL (0.083 %) nebulizer solution Take 3 mL (2.5 mg) by nebulization 4 times a day as needed for wheezing or shortness of breath. 540 mL 3    mirtazapine (Remeron) 15 mg tablet Take 1 tablet (15 mg) by mouth as needed at bedtime. (Patient not taking: Reported on 3/18/2025)      NON FORMULARY Take 2 each by mouth once daily. Proestro (over the counter estrogen)      pantoprazole (ProtoNix) 40 mg EC tablet Take 1 tablet (40 mg) by mouth once daily as needed (heartburn). Do not crush, chew, or split. (Patient not taking: Reported on 11/21/2024) 30 tablet 0    QUEtiapine (SEROquel) 25 mg tablet 1 tablet (25 mg).      QUEtiapine (SEROquel) 400 mg tablet Take 1 tablet (400 mg) by mouth once daily at bedtime.      QUEtiapine (SEROquel) 50 mg tablet Take 0.5 tablets (25 mg) by mouth 3 times a day as needed. (Patient not taking: Reported  "on 3/18/2025)      rivaroxaban (Xarelto) 10 mg tablet Take 1 tablet (10 mg) by mouth once daily. To prevent blood clots 30 tablet 0     No current facility-administered medications for this visit.        OBJECTIVE    PHYSICAL EXAM      9/19/2024    12:13 PM 9/19/2024    12:23 PM 9/19/2024    12:33 PM 11/21/2024     1:10 PM 1/13/2025    12:13 PM 2/7/2025     1:35 PM 3/18/2025    11:41 AM   Vitals   Systolic  111 116 116 132     Diastolic  58 77 82 74     BP Location    Left arm Left arm     Heart Rate 78 77 77 95 100     Temp 36.4 °C (97.6 °F)  36.1 °C (97 °F)       Resp 16 16 16       Height    1.753 m (5' 9\") 1.753 m (5' 9\") 1.753 m (5' 9\") 1.727 m (5' 8\")   Weight (lb)    257 259 259 256   BMI    37.95 kg/m2 38.25 kg/m2 38.25 kg/m2 38.92 kg/m2   BSA (m2)    2.39 m2 2.39 m2 2.39 m2 2.36 m2   Visit Report    Report Report  Report      Body mass index is 38.92 kg/m².    GENERAL: A/Ox3, NAD. Appears healthy, well nourished  PSYCHIATRIC: Mood stable, appropriate memory recall  EYES: EOM intact, no scleral icterus  CARDIAC: regular rate  LUNGS: Breathing non-labored  SKIN: no erythema, rashes, or ecchymoses     MUSCULOSKELETAL:  Laterality: right Knee Exam  - Alignment: Neutral  -Midline incision well-healed  - ROM: 5 to 115 degrees  - Effusion: 2+  - Strength: knee extension and flexion 5/5, EHL/PF/DF motor intact  - Palpation: Tender to palpation on medial lateral joint line and most her tenderness is focused on the proximal tibia  - Stability: Anterior/Posterior stable, varus/valgus stable  - Gait: normal  - Hip Exam: flexion to 100+ degrees, full extension, internal/external rotation adequate, and no pain with log roll  - Special Tests: None performed      NEUROVASCULAR:  - Neurovascular Status: sensation intact to light touch distally  - Capillary refill brisk at extremities, Bilateral dorsalis pedis pulse 2+     IMAGING:  Multiple views of the affected right knee(s) demonstrate: Cemented total knee arthroplasty " and when compared to previous films has stability and maintenance of her femoral component but lucencies appreciated on the tibial baseplate both medially and anteriorly.   X-rays were personally reviewed and interpreted by me.  Radiology reports were reviewed by me as well, if readily available at the time.        Ramya Rendon DO  Attending Surgeon  Joint Replacement and Adult Reconstructive Surgery  [place], [place]

## 2025-03-18 NOTE — PATIENT INSTRUCTIONS
BMI was above normal measurement. Current weight: 116 kg (256 lb)  Weight change since last visit (-) denotes wt loss -3 lbs   Weight loss needed to achieve BMI 25: 91.9 Lbs  Weight loss needed to achieve BMI 30: 59.1 Lbs  Provided instructions on exercise.

## 2025-03-19 LAB
CRP SERPL-MCNC: 4.7 MG/L
ERYTHROCYTE [SEDIMENTATION RATE] IN BLOOD BY WESTERGREN METHOD: 45 MM/H

## 2025-03-21 PROCEDURE — 93248 EXT ECG>7D<15D REV&INTERPJ: CPT | Performed by: INTERNAL MEDICINE

## 2025-04-01 ENCOUNTER — APPOINTMENT (OUTPATIENT)
Dept: ORTHOPEDIC SURGERY | Facility: CLINIC | Age: 45
End: 2025-04-01
Payer: MEDICARE

## 2025-04-01 ENCOUNTER — HOSPITAL ENCOUNTER (OUTPATIENT)
Dept: RADIOLOGY | Facility: EXTERNAL LOCATION | Age: 45
Discharge: HOME | End: 2025-04-01

## 2025-04-01 VITALS — WEIGHT: 260 LBS | BODY MASS INDEX: 39.4 KG/M2 | HEIGHT: 68 IN

## 2025-04-01 DIAGNOSIS — Z96.651 PAIN DUE TO TOTAL RIGHT KNEE REPLACEMENT, INITIAL ENCOUNTER: Primary | ICD-10-CM

## 2025-04-01 DIAGNOSIS — T84.84XA PAIN DUE TO TOTAL RIGHT KNEE REPLACEMENT, INITIAL ENCOUNTER: Primary | ICD-10-CM

## 2025-04-01 PROCEDURE — 20611 DRAIN/INJ JOINT/BURSA W/US: CPT | Performed by: EMERGENCY MEDICINE

## 2025-04-01 PROCEDURE — 88104 CYTOPATH FL NONGYN SMEARS: CPT | Performed by: EMERGENCY MEDICINE

## 2025-04-01 PROCEDURE — 87070 CULTURE OTHR SPECIMN AEROBIC: CPT

## 2025-04-01 PROCEDURE — 87205 SMEAR GRAM STAIN: CPT

## 2025-04-01 PROCEDURE — 3008F BODY MASS INDEX DOCD: CPT | Performed by: EMERGENCY MEDICINE

## 2025-04-01 PROCEDURE — 99204 OFFICE O/P NEW MOD 45 MIN: CPT | Performed by: EMERGENCY MEDICINE

## 2025-04-01 PROCEDURE — 89051 BODY FLUID CELL COUNT: CPT

## 2025-04-01 PROCEDURE — 87075 CULTR BACTERIA EXCEPT BLOOD: CPT

## 2025-04-01 PROCEDURE — 1036F TOBACCO NON-USER: CPT | Performed by: EMERGENCY MEDICINE

## 2025-04-01 RX ORDER — PSYLLIUM HUSK 0.4 G
3 CAPSULE ORAL DAILY
COMMUNITY

## 2025-04-01 RX ORDER — LIDOCAINE HYDROCHLORIDE 10 MG/ML
2 INJECTION, SOLUTION INFILTRATION; PERINEURAL
Status: COMPLETED | OUTPATIENT
Start: 2025-04-01 | End: 2025-04-01

## 2025-04-01 RX ADMIN — LIDOCAINE HYDROCHLORIDE 2 ML: 10 INJECTION, SOLUTION INFILTRATION; PERINEURAL at 13:26

## 2025-04-01 ASSESSMENT — PAIN - FUNCTIONAL ASSESSMENT: PAIN_FUNCTIONAL_ASSESSMENT: 0-10

## 2025-04-01 ASSESSMENT — PAIN SCALES - GENERAL: PAINLEVEL_OUTOF10: 5 - MODERATE PAIN

## 2025-04-01 ASSESSMENT — ENCOUNTER SYMPTOMS
DEPRESSION: 1
LOSS OF SENSATION IN FEET: 0
OCCASIONAL FEELINGS OF UNSTEADINESS: 0

## 2025-04-01 NOTE — PROGRESS NOTES
Subjective    Patient ID: Chloe Mclean is a 45 y.o. female.    Chief Complaint: Pain of the Right Knee (Patient was referred by Doctor Rendon for aspiration of her right knee. Patient has an artificial knee and has a history of 6 surgeries on her right knee. Patient has not been pain free. Uses marijuana for pain management with improvement. XR done 3/18/2025//) and Pain of the Left Knee (Has pain in her left knee as well with no relief. Has had one orthoscopic surgery on her left knee. Patient States she had an XR of the left knee as well. )     Last Surgery: No surgery found  Last Surgery Date: No surgery found    Ms. Mclean is a very pleasant 45-year-old female who has a history of a total right knee replacement and who was sent here by Dr. Rendon for a right knee aspiration under ultrasound guidance to screen for a prosthetic joint infection.  She has chronically elevated inflammatory markers and has been having right knee pain.  She is here for the aspiration to rule out infection.  No recent traumatic events.  She has had injections in the past under ultrasound but has never had an aspiration since the joint replacement.        Objective   Right Knee Exam     Muscle Strength   The patient has normal right knee strength.    Range of Motion   The patient has normal right knee ROM.    Tests   Varus: negative Valgus: negative    Other   Erythema: absent  Scars: present  Sensation: normal  Swelling: mild  Effusion: no effusion present    Comments:  Skin is warm but no obvious erythema      Left Knee Exam     Muscle Strength   The patient has normal left knee strength.            Image Results:  Point of Care Ultrasound  These images are not reportable by radiology and will not be interpreted   by  Radiologists.    Most recent x-rays of the right knee were reviewed and interpreted by me on 4/1/2025 and revealed a prosthetic right knee with hardware that looks intact and no evidence of periprosthetic fracture or  acute injury.    Patient ID: Chloe Mclean is a 45 y.o. female.    L Inj/Asp: R knee on 4/1/2025 1:26 PM  Indications: pain  Details: 20 G needle, ultrasound-guided superolateral approach  Medications: 2 mL lidocaine 10 mg/mL (1 %)  Aspirate: 2 mL clear and yellow; sent for lab analysis  Outcome: tolerated well, no immediate complications  Procedure, treatment alternatives, risks and benefits explained, specific risks discussed. Consent was given by the patient. Immediately prior to procedure a time out was called to verify the correct patient, procedure, equipment, support staff and site/side marked as required. Patient was prepped and draped in the usual sterile fashion.           Assessment/Plan   Encounter Diagnoses:  Pain due to total right knee replacement, initial encounter    Orders Placed This Encounter    L Inj/Asp    Sterile Fluid Culture/Smear    Point of Care Ultrasound    Body Fluid Cell Count With Differential    Body Fluid Cell Count    Body Fluid Differential     No follow-ups on file.    We did a right knee aspiration under ultrasound guidance and the patient tolerated it well without any complications.  Activity modifications were reviewed and the patient is going to follow-up with Dr. Rendon and can come back to see me as needed.  I will forward the labs to Dr. Rendon as soon as they result.    ** Please excuse any errors in grammar or translation related to this dictation. Voice recognition software was utilized to prepare this document. **       Shelton Quevedo MD  [facility]

## 2025-04-02 LAB
BASOPHILS NFR FLD MANUAL: 0 %
BLASTS NFR FLD MANUAL: 0 %
CLARITY FLD: ABNORMAL
COLOR FLD: ABNORMAL
EOSINOPHIL NFR FLD MANUAL: 0 %
IMMATURE GRANULOCYTES IN FLUID: 0 %
LYMPHOCYTES NFR FLD MANUAL: 50 %
MONOS+MACROS NFR FLD MANUAL: 38 %
NEUTROPHILS NFR FLD MANUAL: 9 %
OTHER CELLS NFR FLD MANUAL: 3 %
PATH REVIEW-CELL CT,FLUID: NORMAL
PLASMA CELLS NFR FLD MANUAL: 0 %
RBC # FLD AUTO: 7000 /UL
TOTAL CELLS COUNTED FLD: 100
WBC # FLD AUTO: 479 /UL

## 2025-04-05 LAB
BACTERIA FLD CULT: NORMAL
GRAM STN SPEC: NORMAL
GRAM STN SPEC: NORMAL

## 2025-04-10 ENCOUNTER — APPOINTMENT (OUTPATIENT)
Dept: PULMONOLOGY | Facility: HOSPITAL | Age: 45
End: 2025-04-10
Payer: MEDICARE

## 2025-04-14 ENCOUNTER — APPOINTMENT (OUTPATIENT)
Dept: PULMONOLOGY | Facility: HOSPITAL | Age: 45
End: 2025-04-14
Payer: MEDICARE

## 2025-04-17 ENCOUNTER — APPOINTMENT (OUTPATIENT)
Dept: PRIMARY CARE | Facility: CLINIC | Age: 45
End: 2025-04-17
Payer: MEDICARE

## 2025-04-17 ENCOUNTER — TELEPHONE (OUTPATIENT)
Dept: ORTHOPEDIC SURGERY | Facility: CLINIC | Age: 45
End: 2025-04-17

## 2025-04-17 VITALS — OXYGEN SATURATION: 98 % | DIASTOLIC BLOOD PRESSURE: 73 MMHG | SYSTOLIC BLOOD PRESSURE: 104 MMHG | HEART RATE: 95 BPM

## 2025-04-17 DIAGNOSIS — L40.50 ARTHROPATHIC PSORIASIS, UNSPECIFIED (MULTI): ICD-10-CM

## 2025-04-17 DIAGNOSIS — Z11.59 NEED FOR HEPATITIS C SCREENING TEST: ICD-10-CM

## 2025-04-17 DIAGNOSIS — F31.9 BIPOLAR DEPRESSION (MULTI): ICD-10-CM

## 2025-04-17 DIAGNOSIS — R06.2 WHEEZING: Primary | ICD-10-CM

## 2025-04-17 DIAGNOSIS — D64.9 ANEMIA, UNSPECIFIED TYPE: ICD-10-CM

## 2025-04-17 DIAGNOSIS — E55.9 VITAMIN D DEFICIENCY: ICD-10-CM

## 2025-04-17 DIAGNOSIS — Z11.4 ENCOUNTER FOR SCREENING FOR HIV: ICD-10-CM

## 2025-04-17 DIAGNOSIS — Z00.00 MEDICARE ANNUAL WELLNESS VISIT, SUBSEQUENT: ICD-10-CM

## 2025-04-17 DIAGNOSIS — J44.9 CHRONIC OBSTRUCTIVE PULMONARY DISEASE, UNSPECIFIED COPD TYPE (MULTI): ICD-10-CM

## 2025-04-17 DIAGNOSIS — R73.9 HYPERGLYCEMIA: ICD-10-CM

## 2025-04-17 DIAGNOSIS — F20.9 CHRONIC SCHIZOPHRENIA (MULTI): ICD-10-CM

## 2025-04-17 DIAGNOSIS — J45.909 MODERATE ASTHMA, UNSPECIFIED WHETHER COMPLICATED, UNSPECIFIED WHETHER PERSISTENT (HHS-HCC): ICD-10-CM

## 2025-04-17 PROCEDURE — 99214 OFFICE O/P EST MOD 30 MIN: CPT | Performed by: NURSE PRACTITIONER

## 2025-04-17 PROCEDURE — 1036F TOBACCO NON-USER: CPT | Performed by: NURSE PRACTITIONER

## 2025-04-17 RX ORDER — PREDNISONE 20 MG/1
TABLET ORAL
Qty: 21 TABLET | Refills: 0 | Status: SHIPPED | OUTPATIENT
Start: 2025-04-17

## 2025-04-17 ASSESSMENT — ENCOUNTER SYMPTOMS
LOSS OF SENSATION IN FEET: 0
DEPRESSION: 0
OCCASIONAL FEELINGS OF UNSTEADINESS: 0

## 2025-04-17 ASSESSMENT — ANXIETY QUESTIONNAIRES
3. WORRYING TOO MUCH ABOUT DIFFERENT THINGS: NOT AT ALL
4. TROUBLE RELAXING: NOT AT ALL
2. NOT BEING ABLE TO STOP OR CONTROL WORRYING: NOT AT ALL
GAD7 TOTAL SCORE: 0
7. FEELING AFRAID AS IF SOMETHING AWFUL MIGHT HAPPEN: NOT AT ALL
6. BECOMING EASILY ANNOYED OR IRRITABLE: NOT AT ALL
1. FEELING NERVOUS, ANXIOUS, OR ON EDGE: NOT AT ALL
5. BEING SO RESTLESS THAT IT IS HARD TO SIT STILL: NOT AT ALL

## 2025-04-17 NOTE — PROGRESS NOTES
Subjective   Patient ID: Chloe Mclean is a 45 y.o. female who presents for 3 month follow up.    Patient is following up for management of COPD, asthma, vitamin D deficiency, anemia, tobacco dependence and morbid obesity.  She is under the care of mental health provider for management of bipolar depression and schizophrenia.  Reports she is compliant with her prescribed medications.  Denies acute medical complaint.         Review of Systems   All other systems reviewed and are negative.      Objective   /73 (BP Location: Left arm, Patient Position: Sitting, BP Cuff Size: Large adult)   Pulse 95   SpO2 98%     Physical Exam  Vitals reviewed.   Constitutional:       Appearance: Normal appearance. She is obese.   HENT:      Head: Normocephalic and atraumatic.      Right Ear: External ear normal.      Left Ear: External ear normal.      Nose: Nose normal.      Mouth/Throat:      Mouth: Mucous membranes are moist.   Cardiovascular:      Rate and Rhythm: Normal rate and regular rhythm.      Pulses: Normal pulses.      Heart sounds: Normal heart sounds.   Pulmonary:      Effort: Pulmonary effort is normal.      Breath sounds: Wheezing present.   Musculoskeletal:      Cervical back: Neck supple.   Skin:     General: Skin is warm and dry.   Neurological:      General: No focal deficit present.      Mental Status: She is alert and oriented to person, place, and time.   Psychiatric:         Mood and Affect: Mood normal.         Behavior: Behavior normal.         Thought Content: Thought content normal.         Judgment: Judgment normal.         Assessment/Plan   Problem List Items Addressed This Visit       Hyperglycemia

## 2025-04-17 NOTE — TELEPHONE ENCOUNTER
Patient had fluid drained off of her knee on 4/1. She has not heard anything back about her test results.   Please advise

## 2025-04-17 NOTE — PATIENT INSTRUCTIONS
I have prescribed Prednisone for wheezing. I have ordered labs for you to complete a few days prior to 6-month follow-up for annual Medicare wellness exam.

## 2025-04-20 PROBLEM — F31.30 BIPOLAR DISORDER, MOST RECENT EPISODE DEPRESSED (MULTI): Status: RESOLVED | Noted: 2021-07-13 | Resolved: 2025-04-20

## 2025-04-20 PROBLEM — I97.89 NECROSIS OF SURGICAL WOUND (MULTI): Status: RESOLVED | Noted: 2023-05-03 | Resolved: 2025-04-20

## 2025-04-20 PROBLEM — J01.00 ACUTE MAXILLARY SINUSITIS: Status: RESOLVED | Noted: 2024-07-08 | Resolved: 2025-04-20

## 2025-04-20 PROBLEM — I96 NECROSIS OF SURGICAL WOUND (MULTI): Status: RESOLVED | Noted: 2023-05-03 | Resolved: 2025-04-20

## 2025-04-20 PROBLEM — R06.2 WHEEZING: Status: ACTIVE | Noted: 2025-04-20

## 2025-04-20 PROBLEM — Z11.59 NEED FOR HEPATITIS C SCREENING TEST: Status: ACTIVE | Noted: 2025-04-20

## 2025-04-20 PROBLEM — E66.01 OBESITY, MORBID (MULTI): Status: RESOLVED | Noted: 2024-07-08 | Resolved: 2025-04-20

## 2025-04-20 PROBLEM — R06.09 DYSPNEA ON EXERTION: Status: RESOLVED | Noted: 2023-07-13 | Resolved: 2025-04-20

## 2025-04-20 PROBLEM — Z11.4 ENCOUNTER FOR SCREENING FOR HIV: Status: ACTIVE | Noted: 2025-04-20

## 2025-04-29 ENCOUNTER — APPOINTMENT (OUTPATIENT)
Dept: ORTHOPEDIC SURGERY | Facility: CLINIC | Age: 45
End: 2025-04-29
Payer: MEDICARE

## 2025-04-29 VITALS — HEIGHT: 68 IN | WEIGHT: 260 LBS | BODY MASS INDEX: 39.4 KG/M2

## 2025-04-29 DIAGNOSIS — T84.84XA PAIN DUE TO TOTAL RIGHT KNEE REPLACEMENT, INITIAL ENCOUNTER: ICD-10-CM

## 2025-04-29 DIAGNOSIS — M17.12 PRIMARY OSTEOARTHRITIS OF LEFT KNEE: ICD-10-CM

## 2025-04-29 DIAGNOSIS — Z96.659 ASEPTIC LOOSENING OF PROSTHETIC KNEE, INITIAL ENCOUNTER: ICD-10-CM

## 2025-04-29 DIAGNOSIS — T84.038A ASEPTIC LOOSENING OF PROSTHETIC KNEE, INITIAL ENCOUNTER: ICD-10-CM

## 2025-04-29 DIAGNOSIS — Z96.651 PAIN DUE TO TOTAL RIGHT KNEE REPLACEMENT, INITIAL ENCOUNTER: ICD-10-CM

## 2025-04-29 PROCEDURE — 1036F TOBACCO NON-USER: CPT | Performed by: ORTHOPAEDIC SURGERY

## 2025-04-29 PROCEDURE — 3008F BODY MASS INDEX DOCD: CPT | Performed by: ORTHOPAEDIC SURGERY

## 2025-04-29 PROCEDURE — 99213 OFFICE O/P EST LOW 20 MIN: CPT | Performed by: ORTHOPAEDIC SURGERY

## 2025-04-29 ASSESSMENT — PAIN SCALES - GENERAL: PAINLEVEL_OUTOF10: 1

## 2025-04-29 ASSESSMENT — PAIN - FUNCTIONAL ASSESSMENT: PAIN_FUNCTIONAL_ASSESSMENT: 0-10

## 2025-04-29 NOTE — PROGRESS NOTES
ORTHOPEDIC FOLLOW UP      ============================  IMPRESSION/PLAN:  ============================  45 y.o. female with History of a Right Total Knee Arthroplasty done 12-13 years ago    PLAN:   Discussed the patient findings above and reviewed her current x-rays with her.  At minimum she does have some lucencies noted on the tibial baseplate that is suspicious for septic versus aseptic loosening.  Her aspiration results were not conclusive for prosthetic joint infection so at this point would recommend we start working for physical therapy for lower extremity conditioning get a bone scan to review if she has any sort of baseplate loosening.  Patient is agreeable to plan of care.  Will follow-up after bone scan is complete for reevaluation.      Chloe Mclean presents today for follow up of the above condition. They are here today for a re-evaluation of their right knee. They had a CRP, ESR, and body fluid cell count done.  Cyst continued pain on the shin and tibia    FUNCTIONAL STATUS: occasionally limited.  AMBULATORY STATUS: independent  PREVIOUS TREATMENTS: NSAIDS Advil still taking with mild improvement and Aleve with good improvement, still taking  Therapy PT after Sx  completed  Surgery R TKA done 12-13 years ago with improvement until 1 month ago  HISTORY OF SURGERY ON AFFECTED KNEE(S): Yes     Review of Systems:   Constitutional: See HPI for pain assessment, No significant weight loss, recent trauma. Denies fevers/chills  Cardiovascular: No chest pain, shortness of breath  Respiratory: No difficulty breathing, cough  Gastrointestinal: No nausea, vomiting, diarrhea, constipation  Musculoskeletal: Noted in HPI, no arthralgias   Integumentary: No rashes, easy bruising, redness   Neurological: no numbness or tingling in extremities, no gait disturbances     Problem List[1]    Medical History[2]     Allergies[3]     Surgical History[4]     Family History[5]     Social History     Socioeconomic History     Marital status: Single     Spouse name: Not on file    Number of children: Not on file    Years of education: Not on file    Highest education level: Not on file   Occupational History    Not on file   Tobacco Use    Smoking status: Former     Types: Cigarettes    Smokeless tobacco: Never    Tobacco comments:     Vapping nicotine containing products    Vaping Use    Vaping status: Every Day    Substances: Nicotine    Devices: Disposable   Substance and Sexual Activity    Alcohol use: Never    Drug use: Yes     Types: Marijuana     Comment: occasional    Sexual activity: Not on file   Other Topics Concern    Not on file   Social History Narrative    Not on file     Social Drivers of Health     Financial Resource Strain: Low Risk  (5/17/2024)    Overall Financial Resource Strain (CARDIA)     Difficulty of Paying Living Expenses: Not hard at all   Food Insecurity: Not on file   Transportation Needs: No Transportation Needs (6/11/2024)    OASIS : Transportation     Lack of Transportation (Medical): No     Lack of Transportation (Non-Medical): No     Patient Unable or Declines to Respond: No   Physical Activity: Not on file   Stress: Not on file   Social Connections: Feeling Socially Integrated (6/11/2024)    OASIS : Social Isolation     Frequency of experiencing loneliness or isolation: Never   Intimate Partner Violence: Not on file   Housing Stability: Low Risk  (5/17/2024)    Housing Stability Vital Sign     Unable to Pay for Housing in the Last Year: No     Number of Places Lived in the Last Year: 1     Unstable Housing in the Last Year: No        CURRENT MEDICATIONS:   Current Medications[6]     =================================  EXAM  =================================  GENERAL: A/Ox3, NAD. Appears healthy, well nourished  PSYCHIATRIC: Mood stable, appropriate memory recall  EYES: EOM intact, no scleral icterus  CARDIAC: regular rate  LUNGS: Breathing non-labored  SKIN: no erythema, rashes, or ecchymoses       MUSCULOSKELETAL:  Laterality: right Knee Exam  - Alignment: Neutral  -Midline incision well-healed  - ROM: 5 to 115 degrees  - Effusion: 2+  - Strength: knee extension and flexion 5/5, EHL/PF/DF motor intact  - Palpation: Tender to palpation on medial lateral joint line and most her tenderness is focused on the proximal tibia  - Stability: Anterior/Posterior stable, varus/valgus stable  - Gait: normal  - Hip Exam: flexion to 100+ degrees, full extension, internal/external rotation adequate, and no pain with log roll  - Special Tests: None performed        NEUROVASCULAR:  - Neurovascular Status: sensation intact to light touch distally  - Capillary refill brisk at extremities, Bilateral dorsalis pedis pulse 2+      IMAGING:  Multiple views of the affected right knee(s) demonstrate: Cemented total knee arthroplasty and when compared to previous films has stability and maintenance of her femoral component but lucencies appreciated on the tibial baseplate both medially and anteriorly.   X-rays were personally reviewed and interpreted by me.  Radiology reports were reviewed by me as well, if readily available at the time.       Body mass index is 39.53 kg/m².        Ramya Rendon DO  Attending Surgeon  [facility]  [place], [place]            [1]   Patient Active Problem List  Diagnosis    Bipolar depression (Multi)    Closed displaced fracture of neck of right fifth metacarpal bone    Acetabulum fracture, left    Fracture, hip (Multi)    Hemoptysis    Systemic lupus erythematosus, unspecified    MSSA (methicillin susceptible Staphylococcus aureus) infection    Pelvic abscess in female    Right knee pain    Seborrheic dermatitis of scalp    Urinary retention    Weight gain    Wound infection after surgery    Medicare annual wellness visit, subsequent    Hyperglycemia    Anemia    Exertional shortness of breath    Vaping nicotine dependence, tobacco product     Advance directive discussed with patient    Abnormal computerized axial tomography of chest    Allergic rhinitis    HERBERT positive    Aspiration pneumonia (Multi)    Asthma    Carpal tunnel syndrome of right wrist    Chronic schizophrenia (Multi)    Closed fracture of posterior wall of acetabulum (Multi)    Closed traumatic dislocation of hip (Multi)    Colitis    Facial cellulitis    Fibromyalgia    Insomnia due to medical condition    Lung mass    Moderate episode of recurrent major depressive disorder    Obesity with body mass index 30 or greater    Obstructive sleep apnea syndrome    Primary osteoarthritis of right knee    Arthropathic psoriasis, unspecified (Multi)    Pulmonary collapse    Rectal hemorrhage    Trichomonas vaginitis    Urinary tract infectious disease    Fracture of acetabulum    Fracture of bone of hip (Multi)    Acute bronchitis    Folliculitis    Acute dermatitis    Closed fracture of neck of fifth metacarpal bone    Vitamin D deficiency    Lupus erythematosus    Methicillin susceptible Staphylococcus aureus infection    Abscess of female pelvis    Rash and other nonspecific skin eruption    Arthralgia of hip    Knee pain    Seborrhea capitis    Tobacco dependence due to cigarettes    Retention of urine    Local infection of wound    Postoperative wound infection    Wound dehiscence    History of osteomyelitis    PONV (postoperative nausea and vomiting)    Chronic obstructive pulmonary disease, unspecified    Wheezing    Encounter for screening for HIV    Need for hepatitis C screening test   [2]   Past Medical History:  Diagnosis Date    Anxiety and depression     h/o inpatient evaluation for SI in 2016    Asthma     COPD (chronic obstructive pulmonary disease) (Multi)     Fungal infection of lung 07/12/2021    + Carri dubliniensis bronchial washing    H/O being hospitalized 07/2009    + Clostridium difficile toxin and salmonella diarrhea    History of venous thromboembolism     after  surgery    Lung mass     6/2010 lung biopsy organizing pneumonia    Lupus     Mucus plugging of bronchi 07/2021    Post-op ORIF acetabulum requiring increased O2 support. CXR found left lung collapse, requiring bronchoscopy in order to wash out and re-expand the lung    Obstructive sleep apnea syndrome 9/7/2023    Osteoarthritis     PONV (postoperative nausea and vomiting)     Psoriatic arthritis (Multi)     Cannot be on biologic due to prior fungal pulmonary infection    Schizophrenia     Seasonal allergies     Snores 12/2023    Home sleep study in 12/2023 did not show significant sleep apnea or oxygen desaturations. Mild snoring is noted.   [3]   Allergies  Allergen Reactions    Iodine Rash    Latex Rash   [4]   Past Surgical History:  Procedure Laterality Date    APPENDECTOMY      BRONCHOSCOPY Left     A mucous plug was found in the left mainstem bronchus    CHOLECYSTECTOMY      HYSTERECTOMY      KNEE ARTHROSCOPY W/ DEBRIDEMENT Right 12/08/2008    of medial meniscus, the lateral meniscus; chondroplasty of the lateral tibial condyle,the medial femoral condyle, and large patellar plica    ORIF PELVIC FRACTURE Left 07/07/2021    ORIF left anterior Column and wall acetabulum fracture c/b Wound dehiscence requiring debridement    PELVIS DEBRIDEMENT Left 09/15/2021    Irrigation and excisional debridement of skin, subcutaneous tissue, fat,fascia, of postoperative surgical wound infection [id number]: Saucerization of left iliac crest/pelvis bone. Left hip arthrocentesis. Drainage of left pelvic abscess. Application of negative pressure wound VAC. Secondary complex wound closure    PELVIS DEBRIDEMENT Left 10/14/2021    Irrigation and excisional debridement of pelvic abscess. Wound closure of left pelvis. Debridement/saucerization of iliac wing    TOTAL HIP ARTHROPLASTY Left 05/17/2024    TOTAL KNEE ARTHROPLASTY Right 11/2012   [5]   Family History  Problem Relation Name Age of Onset    Anesthesia problems Mother           delayed emergence   [6]   Current Outpatient Medications   Medication Sig Dispense Refill    QUEtiapine (SEROquel) 25 mg tablet 1 tablet (25 mg).      QUEtiapine (SEROquel) 400 mg tablet Take 1 tablet (400 mg) by mouth once daily at bedtime.      albuterol (ProAir HFA) 90 mcg/actuation inhaler Inhale 2 puffs every 4 hours if needed for wheezing. 8.5 g 11    albuterol 2.5 mg /3 mL (0.083 %) nebulizer solution Take 3 mL (2.5 mg) by nebulization 4 times a day as needed for wheezing or shortness of breath. 540 mL 3    predniSONE (Deltasone) 20 mg tablet Take 3 tabs (60mg) daily for 5 days, then take 2 tabs (40mg) daily for 2 days, then take 1 tab (20mg) daily for 2 days. (Patient not taking: Reported on 4/29/2025) 21 tablet 0     No current facility-administered medications for this visit.

## 2025-05-06 ENCOUNTER — OFFICE VISIT (OUTPATIENT)
Dept: PULMONOLOGY | Facility: HOSPITAL | Age: 45
End: 2025-05-06
Payer: MEDICARE

## 2025-05-06 VITALS
WEIGHT: 259 LBS | HEIGHT: 69 IN | BODY MASS INDEX: 38.36 KG/M2 | DIASTOLIC BLOOD PRESSURE: 77 MMHG | SYSTOLIC BLOOD PRESSURE: 107 MMHG | RESPIRATION RATE: 16 BRPM | OXYGEN SATURATION: 97 % | HEART RATE: 88 BPM

## 2025-05-06 DIAGNOSIS — R06.02 EXERTIONAL SHORTNESS OF BREATH: ICD-10-CM

## 2025-05-06 DIAGNOSIS — J45.40 MODERATE PERSISTENT ASTHMA WITHOUT COMPLICATION (HHS-HCC): ICD-10-CM

## 2025-05-06 DIAGNOSIS — F17.290 VAPING NICOTINE DEPENDENCE, TOBACCO PRODUCT: ICD-10-CM

## 2025-05-06 DIAGNOSIS — J30.2 SEASONAL ALLERGIC RHINITIS, UNSPECIFIED TRIGGER: ICD-10-CM

## 2025-05-06 DIAGNOSIS — J44.9 CHRONIC OBSTRUCTIVE PULMONARY DISEASE, UNSPECIFIED COPD TYPE (MULTI): ICD-10-CM

## 2025-05-06 DIAGNOSIS — J45.40 MODERATE PERSISTENT ASTHMA WITHOUT COMPLICATION (HHS-HCC): Primary | ICD-10-CM

## 2025-05-06 DIAGNOSIS — F12.90 MARIJUANA SMOKER: ICD-10-CM

## 2025-05-06 PROCEDURE — 99214 OFFICE O/P EST MOD 30 MIN: CPT | Performed by: INTERNAL MEDICINE

## 2025-05-06 PROCEDURE — 3008F BODY MASS INDEX DOCD: CPT | Performed by: INTERNAL MEDICINE

## 2025-05-06 PROCEDURE — 1036F TOBACCO NON-USER: CPT | Performed by: INTERNAL MEDICINE

## 2025-05-06 RX ORDER — ALBUTEROL SULFATE 90 UG/1
4 INHALANT RESPIRATORY (INHALATION) ONCE
OUTPATIENT
Start: 2025-05-06

## 2025-05-06 RX ORDER — ALBUTEROL SULFATE 90 UG/1
2 INHALANT RESPIRATORY (INHALATION) EVERY 4 HOURS PRN
Qty: 8.5 G | Refills: 11 | Status: SHIPPED | OUTPATIENT
Start: 2025-05-06 | End: 2026-05-06

## 2025-05-06 RX ORDER — FLUTICASONE PROPIONATE AND SALMETEROL 500; 50 UG/1; UG/1
1 POWDER RESPIRATORY (INHALATION)
Qty: 3 EACH | Refills: 3 | Status: SHIPPED | OUTPATIENT
Start: 2025-05-06 | End: 2026-05-06

## 2025-05-06 RX ORDER — FLUTICASONE PROPIONATE 50 MCG
2 SPRAY, SUSPENSION (ML) NASAL DAILY
Qty: 48 G | Refills: 3 | Status: SHIPPED | OUTPATIENT
Start: 2025-05-06 | End: 2025-11-02

## 2025-05-06 RX ORDER — ALBUTEROL SULFATE 0.83 MG/ML
3 SOLUTION RESPIRATORY (INHALATION) ONCE
OUTPATIENT
Start: 2025-05-06 | End: 2025-05-06

## 2025-05-06 RX ORDER — ALBUTEROL SULFATE 0.83 MG/ML
2.5 SOLUTION RESPIRATORY (INHALATION) 4 TIMES DAILY PRN
Qty: 540 ML | Refills: 3 | Status: SHIPPED | OUTPATIENT
Start: 2025-05-06 | End: 2025-11-02

## 2025-05-06 RX ORDER — ALBUTEROL SULFATE 90 UG/1
2 INHALANT RESPIRATORY (INHALATION) EVERY 4 HOURS PRN
Qty: 8.5 G | Refills: 11 | Status: SHIPPED | OUTPATIENT
Start: 2025-05-06 | End: 2025-05-06 | Stop reason: WASHOUT

## 2025-05-06 RX ORDER — CETIRIZINE HYDROCHLORIDE 10 MG/1
10 TABLET ORAL DAILY
Qty: 90 TABLET | Refills: 3 | Status: SHIPPED | OUTPATIENT
Start: 2025-05-06 | End: 2026-05-06

## 2025-05-06 ASSESSMENT — ENCOUNTER SYMPTOMS: COUGH: 1

## 2025-05-06 NOTE — PATIENT INSTRUCTIONS
1. Please take Wixela 1 puff in the morning and in the evening.  2. Take flonase nasal spray 1 in each nostril daily.   3. Take albuterol inhaler as needed for shortness of breath.  4. Please STOP VAPING as discussed.   5. Start taking cetirizine 1 tablet daily especially in the Spring Season and rest of the year as needed for postnasal drip and sinus congestion.  6. Please complete blood work today.  7. Follow up with  in early March 2026. Make a sooner appointment to see me if your symptoms worsen.  
(1) Female

## 2025-05-06 NOTE — PROGRESS NOTES
Subjective   Patient ID: Chloe Mclean is a 45 y.o. female who presents for COPD (Patient is here for a follow up visit. Patient says her breathing has not been very good. Patient admits to a dry cough. She was just on a prednisone taper from PCP because she was wheezing at her last appointment. Patient is using rescue inhaler 1-2x a day. She is not using nebulizer right now because she needs new tubing. Patient is not on a cpap or oxygen. Patient is currently vaping. Patient has no other concerns for today. ).  HPI  Ms. Mclean is a 44 yo female with PMH of COPD is here to establish with pulmonary. She states that she was seeing Dr. Ruelas in the past. She started smoking at age of 11 and smoked 1ppd and up to 2 ppd for 10 years but quit in November 2022. She is now vaping instead with nicotine. She does not smoke THC and does not do any illicit drugs. She denies drinking alcohol. 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 She states she had pneumothorax and states required surgery in July 2021. She gets SOB with steps and strenuous activity. She has occasional cough and sometimes productive but usually dry. Hot shower bring up her cough with dark phlegm. She also states was diagnosed with Asthma as a child. Her triggers are usually heat, humidity, dust and smoke. She states she used to get Remicade with Dr. Juarez for psoriatic arthritis. She snores endorsed by her friend and has EDS and fatigue too. She states she has DASHA and was given CPAP but did not use it. She has lost about 90 lbs. She was hospitalized at [facility] for pneumothorax in 2021. She had a home sleep study that only showed primary snoring.  She denies any sleepiness or tiredness at this time.    Patient is here for follow-up.  She reports she is doing well overall. She has noted worsening of her asthma symptoms in Spring but was doing good in the Summer, Fall and winter was good. She did get prednisone 2  weeks ago with PCP given for wheezing. She states she is doing well right now but when she gets in the AM her chest is tight and has dry cough which responds to Albuterol MDI. She states she has still been vaping and using THC but has reduced the dose and frequency. She has not been taking Symbicort since 2024.  She has occasional cough and postnasal drip.  She has not been taking Flonase however or other allergy meds.          Review of Systems   HENT:  Positive for postnasal drip.    Respiratory:  Positive for cough.    All other systems reviewed and are negative.      Objective   Physical Exam  Vitals and nursing note reviewed.   Constitutional:       Appearance: Normal appearance.   HENT:      Head: Normocephalic.      Nose: Nose normal.      Mouth/Throat:      Pharynx: Oropharynx is clear.   Eyes:      Extraocular Movements: Extraocular movements intact.      Conjunctiva/sclera: Conjunctivae normal.      Pupils: Pupils are equal, round, and reactive to light.   Cardiovascular:      Rate and Rhythm: Normal rate and regular rhythm.      Pulses: Normal pulses.      Heart sounds: Normal heart sounds.   Pulmonary:      Effort: Pulmonary effort is normal.      Breath sounds: Normal breath sounds.   Abdominal:      General: Bowel sounds are normal.      Palpations: Abdomen is soft.   Musculoskeletal:         General: Normal range of motion.      Cervical back: Normal range of motion.   Skin:     General: Skin is warm.   Neurological:      General: No focal deficit present.      Mental Status: She is alert and oriented to person, place, and time. Mental status is at baseline.   Psychiatric:         Mood and Affect: Mood normal.         Behavior: Behavior normal.       Assessment/Plan   1. COPD  2. Asthma  3. DASHA  4. Allergic rhinitis  5. Obesity  6. Hx of pneumothorax  7. Marijuana smoking  8. Nicotine vaping     Plan:  She noted improvement when she took Symbicort regularly. She has not taken Symbicort since early  2024 and did well except exacerbating symptoms of asthma mostly in Spring. She does not like MDI and would like to take DPI. She will see me annually in Spring.    -PFTs July 2023: FEV1 79%, BDR in mid expiratory flow rates, % and %.   -CXR with hyperinflation and air trapping  -Recommend take Wixela daily. Ventolin prn.   -Discussed HST: primary snoring. Reports EDS and fatigue is improved. Discussed that if recurs, should get an in-lab PSG.  -Take Flonase nasal spray for allergic rhinitis  -weight management counseling  -counseled to stop VAPING asap. I counseled her at length about vaping and marijuana smoking discontinuation and effects on her airways & lungs besides control of her ACOS.  -LDCT chest starting at age of 48.   -Check IG E, RAST and AAT.     Respiratory symptoms are scant and occ cough is related to vaping, allergic rhinitis and underlying COPD and asthma. I educated and counseled her to optimize Rx for allergic rhinitis and asthma especially in Spring season. She needs to take Wixela reguarly in Spring and other times she may be ok with Albuterol PRN alone. She needs to take Cetirizine and Flonase also regularly in Spring season and other times prn. If symptoms continue to recur will consider repeating PFTs. Follow up on Ig E, Eos and AAT.

## 2025-05-13 ENCOUNTER — HOSPITAL ENCOUNTER (OUTPATIENT)
Dept: RADIOLOGY | Facility: HOSPITAL | Age: 45
Discharge: HOME | End: 2025-05-13
Payer: MEDICARE

## 2025-05-13 DIAGNOSIS — Z96.659 ASEPTIC LOOSENING OF PROSTHETIC KNEE, INITIAL ENCOUNTER: ICD-10-CM

## 2025-05-13 DIAGNOSIS — T84.038A ASEPTIC LOOSENING OF PROSTHETIC KNEE, INITIAL ENCOUNTER: ICD-10-CM

## 2025-05-13 PROCEDURE — A9503 TC99M MEDRONATE: HCPCS | Performed by: RADIOLOGY

## 2025-05-13 PROCEDURE — 3430000001 HC RX 343 DIAGNOSTIC RADIOPHARMACEUTICALS: Performed by: RADIOLOGY

## 2025-05-13 PROCEDURE — 78315 BONE IMAGING 3 PHASE: CPT

## 2025-05-13 PROCEDURE — 78315 BONE IMAGING 3 PHASE: CPT | Performed by: STUDENT IN AN ORGANIZED HEALTH CARE EDUCATION/TRAINING PROGRAM

## 2025-05-13 RX ADMIN — TECHNETIUM TC 99M MEDRONATE 27.1 MILLICURIE: 25 INJECTION, POWDER, FOR SOLUTION INTRAVENOUS at 09:10

## 2025-05-14 ENCOUNTER — APPOINTMENT (OUTPATIENT)
Dept: RESPIRATORY THERAPY | Facility: HOSPITAL | Age: 45
End: 2025-05-14
Payer: MEDICARE

## 2025-05-14 ENCOUNTER — TELEPHONE (OUTPATIENT)
Dept: PRIMARY CARE | Facility: CLINIC | Age: 45
End: 2025-05-14
Payer: MEDICARE

## 2025-05-14 LAB
25(OH)D3+25(OH)D2 SERPL-MCNC: 25 NG/ML (ref 30–100)
ALBUMIN SERPL-MCNC: 4.1 G/DL (ref 3.6–5.1)
ALP SERPL-CCNC: 92 U/L (ref 31–125)
ALT SERPL-CCNC: 7 U/L (ref 6–29)
ANION GAP SERPL CALCULATED.4IONS-SCNC: 9 MMOL/L (CALC) (ref 7–17)
AST SERPL-CCNC: 9 U/L (ref 10–35)
BILIRUB SERPL-MCNC: 0.3 MG/DL (ref 0.2–1.2)
BUN SERPL-MCNC: 9 MG/DL (ref 7–25)
CALCIUM SERPL-MCNC: 9.1 MG/DL (ref 8.6–10.2)
CHLORIDE SERPL-SCNC: 108 MMOL/L (ref 98–110)
CHOLEST SERPL-MCNC: 194 MG/DL
CHOLEST/HDLC SERPL: 4.3 (CALC)
CO2 SERPL-SCNC: 23 MMOL/L (ref 20–32)
CREAT SERPL-MCNC: 0.71 MG/DL (ref 0.5–0.99)
EGFRCR SERPLBLD CKD-EPI 2021: 105 ML/MIN/1.73M2
ERYTHROCYTE [DISTWIDTH] IN BLOOD BY AUTOMATED COUNT: 13 % (ref 11–15)
EST. AVERAGE GLUCOSE BLD GHB EST-MCNC: 108 MG/DL
EST. AVERAGE GLUCOSE BLD GHB EST-SCNC: 6 MMOL/L
GLUCOSE SERPL-MCNC: 94 MG/DL (ref 65–99)
HBA1C MFR BLD: 5.4 %
HCT VFR BLD AUTO: 39.9 % (ref 35–45)
HCV AB SERPL QL IA: NORMAL
HDLC SERPL-MCNC: 45 MG/DL
HGB BLD-MCNC: 12.8 G/DL (ref 11.7–15.5)
HIV 1+2 AB+HIV1 P24 AG SERPL QL IA: NORMAL
LDLC SERPL CALC-MCNC: 129 MG/DL (CALC)
MCH RBC QN AUTO: 29 PG (ref 27–33)
MCHC RBC AUTO-ENTMCNC: 32.1 G/DL (ref 32–36)
MCV RBC AUTO: 90.5 FL (ref 80–100)
NONHDLC SERPL-MCNC: 149 MG/DL (CALC)
PLATELET # BLD AUTO: 249 THOUSAND/UL (ref 140–400)
PMV BLD REES-ECKER: 11.6 FL (ref 7.5–12.5)
POTASSIUM SERPL-SCNC: 4.2 MMOL/L (ref 3.5–5.3)
PROT SERPL-MCNC: 6.8 G/DL (ref 6.1–8.1)
RBC # BLD AUTO: 4.41 MILLION/UL (ref 3.8–5.1)
SODIUM SERPL-SCNC: 140 MMOL/L (ref 135–146)
TRIGL SERPL-MCNC: 94 MG/DL
TSH SERPL-ACNC: 2.15 MIU/L
VIT B12 SERPL-MCNC: 325 PG/ML (ref 200–1100)
WBC # BLD AUTO: 6.2 THOUSAND/UL (ref 3.8–10.8)

## 2025-05-14 NOTE — TELEPHONE ENCOUNTER
----- Message from Gopi Heck sent at 5/14/2025  8:39 AM EDT -----  Please tell patient that her lab results are unremarkable besides slightly elevated LDL cholesterol of 129 and low serum vitamin D level of 25.  I recommend weight loss, low-carb small portion diet,   avoiding greasy food and increase activities for management of elevated cholesterol level.  I recommend over-the-counter vitamin D3 1000 unit daily for low vitamin D level.  ----- Message -----  From: [facility] Results In  Sent: 5/13/2025  10:44 PM EDT  To: Gopi Heck, APRN-CNP, DNP

## 2025-05-15 ENCOUNTER — TELEMEDICINE (OUTPATIENT)
Dept: ORTHOPEDIC SURGERY | Facility: HOSPITAL | Age: 45
End: 2025-05-15
Payer: MEDICARE

## 2025-05-15 DIAGNOSIS — Z96.659 ASEPTIC LOOSENING OF PROSTHETIC KNEE, INITIAL ENCOUNTER: Primary | ICD-10-CM

## 2025-05-15 DIAGNOSIS — T84.038A ASEPTIC LOOSENING OF PROSTHETIC KNEE, INITIAL ENCOUNTER: Primary | ICD-10-CM

## 2025-05-15 PROCEDURE — 99211 OFF/OP EST MAY X REQ PHY/QHP: CPT | Performed by: ORTHOPAEDIC SURGERY

## 2025-05-15 NOTE — PROGRESS NOTES
Follow up after 3 phase bone scan done 5/13/25    Virtual or Telephone Consent    An interactive audio telecommunication system which permits real time communications between the patient (at the originating site) and provider (at the distant site) was utilized to provide this telehealth service.  Video telecommunication was available but not utilized due to patient preference.  Verbal consent was requested and obtained from Chloe Mclean on this date, 05/16/25 for a telehealth visit and the patient's location was confirmed at the time of the visit.    Discussed that patient's current bone scan results are consistent with loosening of her femoral and tibial components of her knee replacement.  We reviewed that this would likely not an uncommon issue given that she is well over a decade out from her surgery.  She had a previous infectious workup which was normal.  At this point discussed that she likely would benefit from revision arthroplasty of her knee replacement and would have her follow-up in the office for further details and discussion for surgical planning.  Patient is agree with the plan of care.  Office appointment is arranged at the conclusion of the call.    A total of 20 minutes was spent with chart review, reviewing imaging, reviewing bone scan results and previous labs along with calling patient and working on the treatment plan and scheduling.

## 2025-05-16 LAB
A ALTERNATA IGE QN: <0.1 KU/L
A ALTERNATA IGE RAST: 0
A FUMIGATUS IGE QN: <0.1 KU/L
A FUMIGATUS IGE RAST: 0
A1AT PHENOTYP SERPL-IMP: NORMAL
BERMUDA GRASS IGE QN: <0.1 KU/L
BERMUDA GRASS IGE RAST: 0
BOXELDER IGE QN: <0.1 KU/L
BOXELDER IGE RAST: 0
C HERBARUM IGE QN: <0.1 KU/L
C HERBARUM IGE RAST: 0
CALIF WALNUT POLN IGE QN: <0.1 KU/L
CALIF WALNUT POLN IGE RAST: 0
CAT DANDER IGE QN: <0.1 KU/L
CAT DANDER IGE RAST: 0
CMN PIGWEED IGE QN: <0.1 KU/L
CMN PIGWEED IGE RAST: 0
COMMON RAGWEED IGE QN: <0.1 KU/L
COMMON RAGWEED IGE RAST: 0
COTTONWOOD IGE QN: <0.1 KU/L
COTTONWOOD IGE RAST: 0
D FARINAE IGE QN: <0.1 KU/L
D FARINAE IGE RAST: 0
D PTERONYSS IGE QN: <0.1 KU/L
D PTERONYSS IGE RAST: 0
DOG DANDER IGE QN: <0.1 KU/L
DOG DANDER IGE RAST: 0
IGE SERPL-ACNC: 3 KU/L
LONDON PLANE IGE QN: <0.1 KU/L
LONDON PLANE IGE RAST: 0
MOUSE URINE PROT IGE QN: <0.1 KU/L
MOUSE URINE PROT IGE RAST: 0
MT JUNIPER IGE QN: <0.1 KU/L
MT JUNIPER IGE RAST: 0
P NOTATUM IGE QN: <0.1 KU/L
P NOTATUM IGE RAST: 0
PECAN/HICK TREE IGE QN: <0.1 KU/L
PECAN/HICK TREE IGE RAST: 0
REF LAB TEST REF RANGE: NORMAL
ROACH IGE QN: <0.1 KU/L
ROACH IGE RAST: 0
SALTWORT IGE QN: <0.1 KU/L
SALTWORT IGE RAST: 0
SHEEP SORREL IGE QN: <0.1 KU/L
SHEEP SORREL IGE RAST: 0
SILVER BIRCH IGE QN: <0.1 KU/L
SILVER BIRCH IGE RAST: 0
TIMOTHY IGE QN: <0.1 KU/L
TIMOTHY IGE RAST: 0
WHITE ASH IGE QN: <0.1 KU/L
WHITE ASH IGE RAST: 0
WHITE ELM IGE QN: <0.1 KU/L
WHITE ELM IGE RAST: 0
WHITE MULBERRY IGE QN: <0.1 KU/L
WHITE MULBERRY IGE RAST: 0
WHITE OAK IGE QN: <0.1 KU/L
WHITE OAK IGE RAST: 0

## 2025-05-19 ENCOUNTER — HOSPITAL ENCOUNTER (OUTPATIENT)
Dept: RESPIRATORY THERAPY | Facility: HOSPITAL | Age: 45
Discharge: HOME | End: 2025-05-19
Payer: MEDICARE

## 2025-05-19 ENCOUNTER — APPOINTMENT (OUTPATIENT)
Dept: RESPIRATORY THERAPY | Facility: HOSPITAL | Age: 45
End: 2025-05-19
Payer: MEDICARE

## 2025-05-19 ENCOUNTER — APPOINTMENT (OUTPATIENT)
Dept: ORTHOPEDIC SURGERY | Facility: CLINIC | Age: 45
End: 2025-05-19
Payer: MEDICARE

## 2025-05-19 DIAGNOSIS — J45.40 MODERATE PERSISTENT ASTHMA WITHOUT COMPLICATION (HHS-HCC): ICD-10-CM

## 2025-05-19 LAB
MGC ASCENT PFT - FEV1 - POST: 2.61
MGC ASCENT PFT - FEV1 - PRE: 2.22
MGC ASCENT PFT - FEV1 - PREDICTED: 3.12
MGC ASCENT PFT - FVC - POST: 3.77
MGC ASCENT PFT - FVC - PRE: 3.41
MGC ASCENT PFT - FVC - PREDICTED: 3.86

## 2025-05-19 PROCEDURE — 2500000001 HC RX 250 WO HCPCS SELF ADMINISTERED DRUGS (ALT 637 FOR MEDICARE OP): Performed by: INTERNAL MEDICINE

## 2025-05-19 PROCEDURE — 94726 PLETHYSMOGRAPHY LUNG VOLUMES: CPT

## 2025-05-19 PROCEDURE — 94640 AIRWAY INHALATION TREATMENT: CPT

## 2025-05-19 RX ORDER — ALBUTEROL SULFATE 90 UG/1
4 INHALANT RESPIRATORY (INHALATION) ONCE
Status: COMPLETED | OUTPATIENT
Start: 2025-05-19 | End: 2025-05-19

## 2025-05-19 RX ORDER — ALBUTEROL SULFATE 0.83 MG/ML
3 SOLUTION RESPIRATORY (INHALATION) ONCE
Status: COMPLETED | OUTPATIENT
Start: 2025-05-19 | End: 2025-05-19

## 2025-05-19 RX ADMIN — ALBUTEROL SULFATE 4 PUFF: 90 AEROSOL, METERED RESPIRATORY (INHALATION) at 15:18

## 2025-05-20 ENCOUNTER — OFFICE VISIT (OUTPATIENT)
Dept: ORTHOPEDIC SURGERY | Facility: CLINIC | Age: 45
End: 2025-05-20
Payer: MEDICARE

## 2025-05-20 ENCOUNTER — APPOINTMENT (OUTPATIENT)
Dept: ORTHOPEDIC SURGERY | Facility: CLINIC | Age: 45
End: 2025-05-20
Payer: MEDICARE

## 2025-05-20 ENCOUNTER — PREP FOR PROCEDURE (OUTPATIENT)
Dept: ORTHOPEDIC SURGERY | Facility: CLINIC | Age: 45
End: 2025-05-20

## 2025-05-20 VITALS — HEIGHT: 69 IN | BODY MASS INDEX: 38.18 KG/M2 | WEIGHT: 257.8 LBS

## 2025-05-20 DIAGNOSIS — Z96.659 ASEPTIC LOOSENING OF PROSTHETIC KNEE, INITIAL ENCOUNTER: Primary | ICD-10-CM

## 2025-05-20 DIAGNOSIS — T84.038A ASEPTIC LOOSENING OF PROSTHETIC KNEE, INITIAL ENCOUNTER: Primary | ICD-10-CM

## 2025-05-20 DIAGNOSIS — Z96.659 LOOSENING OF KNEE JOINT PROSTHESIS, INITIAL ENCOUNTER: Primary | ICD-10-CM

## 2025-05-20 DIAGNOSIS — T84.038A LOOSENING OF KNEE JOINT PROSTHESIS, INITIAL ENCOUNTER: Primary | ICD-10-CM

## 2025-05-20 PROCEDURE — 1036F TOBACCO NON-USER: CPT | Performed by: ORTHOPAEDIC SURGERY

## 2025-05-20 PROCEDURE — 99214 OFFICE O/P EST MOD 30 MIN: CPT | Performed by: ORTHOPAEDIC SURGERY

## 2025-05-20 PROCEDURE — 3008F BODY MASS INDEX DOCD: CPT | Performed by: ORTHOPAEDIC SURGERY

## 2025-05-20 ASSESSMENT — PAIN - FUNCTIONAL ASSESSMENT: PAIN_FUNCTIONAL_ASSESSMENT: NO/DENIES PAIN

## 2025-05-20 NOTE — PROGRESS NOTES
ORTHOPEDIC FOLLOW UP      ============================  IMPRESSION/PLAN:  ============================  45 y.o. 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  At minimum she does have some lucencies noted on the tibial baseplate that is suspicious for septic versus aseptic loosening.  Her aspiration results were not consistent with any sort of prosthetic joint flexion and her bone scan does show loosening of both the femoral tibial component that we have noted on her x-rays.  Given this we discussed the option of revision arthroplasty with 2 component revision.  She feels that her quality life is equally affected and getting worse due to the symptoms she is having due to the loosening of her implant wants to discuss surgical intervention.    Chloe Mclean has radiographic and physical exam evidence of loosening of right total knee arthroplasty.  The patient appears to have sufficient symptoms to warrant surgical intervention and is an appropriate candidate for right Revision Total Knee Arthroplasty as evidenced by six months of unsuccessful non-operative treatment as outlined in the HPI, progressive symptoms including pain impacting sleep or causing fatigue, pain impacting work, pain worsened with weight bearing, and pain limiting ability to stay fit and healthy.     We had a lengthy discussion regarding the risk and benefit of surgery, the alternatives, limitations, and personnel involved. The include but are not limited to infection, persistent pain, instability, nerve injury, blood clots, and medical complications. We also discussed the pre-operative course, surgery itself, and rehabilitation. Perioperative blood management and transfusion issues were discussed, and options clearly outlined. The patient consented to the use of allogenic blood  if medically necessary.     The patient has elected to schedule surgery at this time or intents to call the office with a surgical date. Shared decision making occurred while obtaining informed consent. The patient with be scheduled for a pre-operative education class. The patient will be ordered appropriate preoperative labs to be completed for preadmission testing.     Robotic Assisted Surgery: No    - Preoperative Consults: PAT Clearance   - Disposition: Inpatient  - Anesthesia Plan: Preoperative block, General, local  - Implants: [place] triathlon TS, femoral/tibial cones, Andreas knee  - Physical Therapy Plan: Home  - [id number]: Yes  - Surgical Approach: Standard midline utilizing previous incision  - DVT PPx: Xarelto    Risk Assessment for Post-Op Antibiotics   - Revision surgery: 6 weeks postoperative doxycycline    Chloe Mclean presents today for follow up of the above condition. They are here today for a pre-op visit for their right knee as discussed in a tele-health visit on 5/15/2025.  Has continued pain in the knee that is worsening.  No other symptom changes or new injuries reported.    FUNCTIONAL STATUS: occasionally limited.  AMBULATORY STATUS: independent  PREVIOUS TREATMENTS: NSAIDS Advil still taking with mild improvement and Aleve with good improvement, still taking  Therapy PT after Sx  completed  Surgery R TKA done 12-13 years ago with improvement until 1 month ago  HISTORY OF SURGERY ON AFFECTED KNEE(S): Yes     Review of Systems:   Constitutional: See HPI for pain assessment, No significant weight loss, recent trauma. Denies fevers/chills  Cardiovascular: No chest pain, shortness of breath  Respiratory: No difficulty breathing, cough  Gastrointestinal: No nausea, vomiting, diarrhea, constipation  Musculoskeletal: Noted in HPI, no arthralgias   Integumentary: No rashes, easy bruising, redness   Neurological: no numbness or tingling in extremities, no gait disturbances     Problem  List[1]    Medical History[2]     Allergies[3]     Surgical History[4]     Family History[5]     Social History     Socioeconomic History    Marital status: Single     Spouse name: Not on file    Number of children: Not on file    Years of education: Not on file    Highest education level: Not on file   Occupational History    Not on file   Tobacco Use    Smoking status: Former     Types: Cigarettes    Smokeless tobacco: Never    Tobacco comments:     Vapping nicotine containing products    Vaping Use    Vaping status: Every Day    Substances: Nicotine    Devices: Disposable   Substance and Sexual Activity    Alcohol use: Never    Drug use: Yes     Types: Marijuana     Comment: occasional    Sexual activity: Not on file   Other Topics Concern    Not on file   Social History Narrative    Not on file     Social Drivers of Health     Financial Resource Strain: Low Risk  (5/17/2024)    Overall Financial Resource Strain (CARDIA)     Difficulty of Paying Living Expenses: Not hard at all   Food Insecurity: Not on file   Transportation Needs: No Transportation Needs (6/11/2024)    OASIS : Transportation     Lack of Transportation (Medical): No     Lack of Transportation (Non-Medical): No     Patient Unable or Declines to Respond: No   Physical Activity: Not on file   Stress: Not on file   Social Connections: Feeling Socially Integrated (6/11/2024)    OASIS : Social Isolation     Frequency of experiencing loneliness or isolation: Never   Intimate Partner Violence: Not on file   Housing Stability: Low Risk  (5/17/2024)    Housing Stability Vital Sign     Unable to Pay for Housing in the Last Year: No     Number of Places Lived in the Last Year: 1     Unstable Housing in the Last Year: No        CURRENT MEDICATIONS:   Current Medications[6]     =================================  EXAM  =================================  GENERAL: A/Ox3, NAD. Appears healthy, well nourished  PSYCHIATRIC: Mood stable, appropriate memory  recall  EYES: EOM intact, no scleral icterus  CARDIAC: regular rate  LUNGS: Breathing non-labored  SKIN: no erythema, rashes, or ecchymoses      MUSCULOSKELETAL:  Laterality: right Knee Exam  - Alignment: Neutral  -Midline incision well-healed  - ROM: 5 to 115 degrees  - Effusion: 2+  - Strength: knee extension and flexion 5/5, EHL/PF/DF motor intact  - Palpation: Tender to palpation on medial lateral joint line and most her tenderness is focused on the proximal tibia  - Stability: Anterior/Posterior stable, varus/valgus stable  - Gait: normal  - Hip Exam: flexion to 100+ degrees, full extension, internal/external rotation adequate, and no pain with log roll  - Special Tests: None performed        NEUROVASCULAR:  - Neurovascular Status: sensation intact to light touch distally  - Capillary refill brisk at extremities, Bilateral dorsalis pedis pulse 2+      IMAGING:  Multiple views of the affected right knee(s) demonstrate: Cemented total knee arthroplasty and when compared to previous films has stability and maintenance of her femoral component but lucencies appreciated on the tibial baseplate both medially and anteriorly.  Bone scan reviewed which demonstrates obvious uptake in both the femoral and tibial components consistent with loosening.  X-rays were personally reviewed and interpreted by me.  Radiology reports were reviewed by me as well, if readily available at the time.       Body mass index is 37.8 kg/m².        Ramya Rendon DO  Attending Surgeon  Joint Replacement and Adult Reconstructive Surgery  [facility]- [place], [place]            [1]   Patient Active Problem List  Diagnosis    Bipolar depression (Multi)    Closed displaced fracture of neck of right fifth metacarpal bone    Acetabulum fracture, left    Fracture, hip (Multi)    Hemoptysis    Systemic lupus erythematosus, unspecified    MSSA (methicillin susceptible Staphylococcus aureus) infection    Pelvic abscess in female    Right knee  pain    Seborrheic dermatitis of scalp    Urinary retention    Weight gain    Wound infection after surgery    Medicare annual wellness visit, subsequent    Hyperglycemia    Anemia    Exertional shortness of breath    Vaping nicotine dependence, tobacco product    Advance directive discussed with patient    Abnormal computerized axial tomography of chest    Allergic rhinitis    HERBERT positive    Aspiration pneumonia (Multi)    Asthma    Carpal tunnel syndrome of right wrist    Chronic schizophrenia (Multi)    Closed fracture of posterior wall of acetabulum (Multi)    Closed traumatic dislocation of hip (Multi)    Colitis    Facial cellulitis    Fibromyalgia    Insomnia due to medical condition    Lung mass    Moderate episode of recurrent major depressive disorder    Obesity with body mass index 30 or greater    Obstructive sleep apnea syndrome    Primary osteoarthritis of right knee    Arthropathic psoriasis, unspecified (Multi)    Pulmonary collapse    Rectal hemorrhage    Trichomonas vaginitis    Urinary tract infectious disease    Fracture of acetabulum    Fracture of bone of hip (Multi)    Acute bronchitis    Folliculitis    Acute dermatitis    Closed fracture of neck of fifth metacarpal bone    Vitamin D deficiency    Lupus erythematosus    Methicillin susceptible Staphylococcus aureus infection    Abscess of female pelvis    Rash and other nonspecific skin eruption    Arthralgia of hip    Knee pain    Seborrhea capitis    Tobacco dependence due to cigarettes    Retention of urine    Local infection of wound    Postoperative wound infection    Wound dehiscence    History of osteomyelitis    PONV (postoperative nausea and vomiting)    Chronic obstructive pulmonary disease, unspecified    Wheezing    Encounter for screening for HIV    Need for hepatitis C screening test   [2]   Past Medical History:  Diagnosis Date    Anxiety and depression     h/o inpatient evaluation for SI in 2016    Asthma     COPD (chronic  obstructive pulmonary disease) (Multi)     Fungal infection of lung 07/12/2021    + Carri dubliniensis bronchial washing    H/O being hospitalized 07/2009    + Clostridium difficile toxin and salmonella diarrhea    History of venous thromboembolism     after surgery    Lung mass     6/2010 lung biopsy organizing pneumonia    Lupus     Mucus plugging of bronchi 07/2021    Post-op ORIF acetabulum requiring increased O2 support. CXR found left lung collapse, requiring bronchoscopy in order to wash out and re-expand the lung    Obstructive sleep apnea syndrome 9/7/2023    Osteoarthritis     PONV (postoperative nausea and vomiting)     Psoriatic arthritis (Multi)     Cannot be on biologic due to prior fungal pulmonary infection    Schizophrenia     Seasonal allergies     Snores 12/2023    Home sleep study in 12/2023 did not show significant sleep apnea or oxygen desaturations. Mild snoring is noted.   [3]   Allergies  Allergen Reactions    Iodine Rash    Latex Rash   [4]   Past Surgical History:  Procedure Laterality Date    APPENDECTOMY      BRONCHOSCOPY Left     A mucous plug was found in the left mainstem bronchus    CHOLECYSTECTOMY      HYSTERECTOMY      KNEE ARTHROSCOPY W/ DEBRIDEMENT Right 12/08/2008    of medial meniscus, the lateral meniscus; chondroplasty of the lateral tibial condyle,the medial femoral condyle, and large patellar plica    ORIF PELVIC FRACTURE Left 07/07/2021    ORIF left anterior Column and wall acetabulum fracture c/b Wound dehiscence requiring debridement    PELVIS DEBRIDEMENT Left 09/15/2021    Irrigation and excisional debridement of skin, subcutaneous tissue, fat,fascia, of postoperative surgical wound infection [id number]: Saucerization of left iliac crest/pelvis bone. Left hip arthrocentesis. Drainage of left pelvic abscess. Application of negative pressure wound VAC. Secondary complex wound closure    PELVIS DEBRIDEMENT Left 10/14/2021    Irrigation and excisional debridement  of pelvic abscess. Wound closure of left pelvis. Debridement/saucerization of iliac wing    TOTAL HIP ARTHROPLASTY Left 05/17/2024    TOTAL KNEE ARTHROPLASTY Right 11/2012   [5]   Family History  Problem Relation Name Age of Onset    Anesthesia problems Mother          delayed emergence   [6]   Current Outpatient Medications   Medication Sig Dispense Refill    albuterol (ProAir HFA) 90 mcg/actuation inhaler Inhale 2 puffs every 4 hours if needed for wheezing. 8.5 g 11    albuterol 2.5 mg /3 mL (0.083 %) nebulizer solution Take 3 mL (2.5 mg) by nebulization 4 times a day as needed for wheezing or shortness of breath. 540 mL 3    cetirizine (ZyrTEC) 10 mg tablet Take 1 tablet (10 mg) by mouth once daily. 90 tablet 3    fluticasone (Flonase) 50 mcg/actuation nasal spray Administer 2 sprays into each nostril once daily. Shake gently. Before first use, prime pump. After use, clean tip and replace cap. 48 g 3    fluticasone propion-salmeteroL (Wixela Inhub) 500-50 mcg/dose diskus inhaler Inhale 1 puff 2 times a day. Rinse mouth with water after use to reduce aftertaste and incidence of candidiasis. Do not swallow. 3 each 3    QUEtiapine (SEROquel) 25 mg tablet 1 tablet (25 mg).      QUEtiapine (SEROquel) 400 mg tablet Take 1 tablet (400 mg) by mouth once daily at bedtime.      albuterol (ProAir HFA) 90 mcg/actuation inhaler Inhale 2 puffs every 4 hours if needed for wheezing or shortness of breath. 8.5 g 11     No current facility-administered medications for this visit.

## 2025-05-28 RX ORDER — CHLORHEXIDINE GLUCONATE ORAL RINSE 1.2 MG/ML
SOLUTION DENTAL
Qty: 120 ML | Refills: 0 | Status: SHIPPED | OUTPATIENT
Start: 2025-05-28

## 2025-06-02 ENCOUNTER — HOSPITAL ENCOUNTER (OUTPATIENT)
Dept: CARDIOLOGY | Facility: HOSPITAL | Age: 45
Discharge: HOME | End: 2025-06-02
Payer: MEDICARE

## 2025-06-02 ENCOUNTER — PRE-ADMISSION TESTING (OUTPATIENT)
Dept: PREADMISSION TESTING | Facility: HOSPITAL | Age: 45
End: 2025-06-02
Payer: MEDICARE

## 2025-06-02 ENCOUNTER — HOSPITAL ENCOUNTER (OUTPATIENT)
Dept: RADIOLOGY | Facility: HOSPITAL | Age: 45
Discharge: HOME | End: 2025-06-02
Payer: MEDICARE

## 2025-06-02 VITALS
DIASTOLIC BLOOD PRESSURE: 74 MMHG | BODY MASS INDEX: 39.13 KG/M2 | HEIGHT: 68 IN | SYSTOLIC BLOOD PRESSURE: 120 MMHG | HEART RATE: 70 BPM | OXYGEN SATURATION: 99 % | WEIGHT: 258.2 LBS | TEMPERATURE: 97.4 F | RESPIRATION RATE: 20 BRPM

## 2025-06-02 DIAGNOSIS — Z96.659 LOOSENING OF KNEE JOINT PROSTHESIS, SUBSEQUENT ENCOUNTER: ICD-10-CM

## 2025-06-02 DIAGNOSIS — G47.33 OBSTRUCTIVE SLEEP APNEA SYNDROME: ICD-10-CM

## 2025-06-02 DIAGNOSIS — J44.9 CHRONIC OBSTRUCTIVE PULMONARY DISEASE, UNSPECIFIED COPD TYPE (MULTI): ICD-10-CM

## 2025-06-02 DIAGNOSIS — L93.0 LUPUS ERYTHEMATOSUS, UNSPECIFIED FORM: ICD-10-CM

## 2025-06-02 DIAGNOSIS — T84.038D LOOSENING OF KNEE JOINT PROSTHESIS, SUBSEQUENT ENCOUNTER: ICD-10-CM

## 2025-06-02 DIAGNOSIS — Z01.818 PRE-OP EVALUATION: ICD-10-CM

## 2025-06-02 DIAGNOSIS — Z01.818 PRE-OP EVALUATION: Primary | ICD-10-CM

## 2025-06-02 LAB
ABO GROUP (TYPE) IN BLOOD: NORMAL
ANTIBODY SCREEN: NORMAL
ATRIAL RATE: 64 BPM
P AXIS: 29 DEGREES
PR INTERVAL: 206 MS
Q ONSET: 253 MS
QRS COUNT: 11 BEATS
QRS DURATION: 112 MS
QT INTERVAL: 402 MS
QTC CALCULATION(BAZETT): 412 MS
QTC FREDERICIA: 408 MS
R AXIS: 42 DEGREES
RH FACTOR (ANTIGEN D): NORMAL
T AXIS: 40 DEGREES
T OFFSET: 454 MS
VENTRICULAR RATE: 63 BPM

## 2025-06-02 PROCEDURE — 87081 CULTURE SCREEN ONLY: CPT | Mod: PORLAB

## 2025-06-02 PROCEDURE — 86901 BLOOD TYPING SEROLOGIC RH(D): CPT

## 2025-06-02 PROCEDURE — 93005 ELECTROCARDIOGRAM TRACING: CPT

## 2025-06-02 PROCEDURE — 71046 X-RAY EXAM CHEST 2 VIEWS: CPT

## 2025-06-02 PROCEDURE — 99204 OFFICE O/P NEW MOD 45 MIN: CPT | Performed by: CLINICAL NURSE SPECIALIST

## 2025-06-02 PROCEDURE — 71046 X-RAY EXAM CHEST 2 VIEWS: CPT | Performed by: RADIOLOGY

## 2025-06-02 PROCEDURE — 93010 ELECTROCARDIOGRAM REPORT: CPT | Performed by: INTERNAL MEDICINE

## 2025-06-02 PROCEDURE — 36415 COLL VENOUS BLD VENIPUNCTURE: CPT

## 2025-06-02 RX ORDER — VIT C/E/ZN/COPPR/LUTEIN/ZEAXAN 250MG-90MG
25 CAPSULE ORAL DAILY
COMMUNITY

## 2025-06-02 ASSESSMENT — DUKE ACTIVITY SCORE INDEX (DASI)
CAN YOU WALK A BLOCK OR TWO ON LEVEL GROUND: NO
CAN YOU TAKE CARE OF YOURSELF (EAT, DRESS, BATHE, OR USE TOILET): YES
CAN YOU WALK INDOORS, SUCH AS AROUND YOUR HOUSE: YES
CAN YOU DO HEAVY WORK AROUND THE HOUSE LIKE SCRUBBING FLOORS OR LIFTING AND MOVING HEAVY FURNITURE: NO
CAN YOU DO YARD WORK LIKE RAKING LEAVES, WEEDING OR PUSHING A MOWER: NO
CAN YOU PARTICIPATE IN MODERATE RECREATIONAL ACTIVITIES LIKE GOLF, BOWLING, DANCING, DOUBLES TENNIS OR THROWING A BASEBALL OR FOOTBALL: NO
CAN YOU RUN A SHORT DISTANCE: NO
CAN YOU CLIMB A FLIGHT OF STAIRS OR WALK UP A HILL: NO
CAN YOU PARTICIPATE IN STRENOUS SPORTS LIKE SWIMMING, SINGLES TENNIS, FOOTBALL, BASKETBALL, OR SKIING: NO
CAN YOU DO LIGHT WORK AROUND THE HOUSE LIKE DUSTING OR WASHING DISHES: YES
CAN YOU DO MODERATE WORK AROUND THE HOUSE LIKE VACUUMING, SWEEPING FLOORS OR CARRYING GROCERIES: YES

## 2025-06-02 ASSESSMENT — ENCOUNTER SYMPTOMS
RESPIRATORY NEGATIVE: 1
PSYCHIATRIC NEGATIVE: 1
HEMATOLOGIC/LYMPHATIC NEGATIVE: 1
ENDOCRINE NEGATIVE: 1
JOINT SWELLING: 1
ARTHRALGIAS: 1
CONSTITUTIONAL NEGATIVE: 1
EYES NEGATIVE: 1
GASTROINTESTINAL NEGATIVE: 1
MYALGIAS: 1
CARDIOVASCULAR NEGATIVE: 1
ALLERGIC/IMMUNOLOGIC NEGATIVE: 1

## 2025-06-02 ASSESSMENT — LIFESTYLE VARIABLES: SMOKING_STATUS: SMOKER

## 2025-06-02 NOTE — H&P (VIEW-ONLY)
History Of Present Illness  Chloe Mclean is a 45 y.o. female presenting with hx of right knee replacement with subsequent loosening of joint hardware. Scheduled for right knee revision arthroplasty, both components under general/spinal anesthesia per Dr. Rendon on 6/11/25.      Past Medical History  Past Medical History:   Diagnosis Date    Anxiety and depression     h/o inpatient evaluation for SI in 2016    Asthma     COPD (chronic obstructive pulmonary disease) (Multi)     Fungal infection of lung 07/12/2021    + Carri dubliniensis bronchial washing    H/O being hospitalized 07/2009    + Clostridium difficile toxin and salmonella diarrhea    History of venous thromboembolism     after surgery    Lung mass     6/2010 lung biopsy organizing pneumonia    Lupus     Mucus plugging of bronchi 07/2021    Post-op ORIF acetabulum requiring increased O2 support. CXR found left lung collapse, requiring bronchoscopy in order to wash out and re-expand the lung    Obstructive sleep apnea syndrome 9/7/2023    Osteoarthritis     PONV (postoperative nausea and vomiting)     Psoriatic arthritis (Multi)     Cannot be on biologic due to prior fungal pulmonary infection    Schizophrenia     Seasonal allergies     Snores 12/2023    Home sleep study in 12/2023 did not show significant sleep apnea or oxygen desaturations. Mild snoring is noted.       Surgical History  Past Surgical History:   Procedure Laterality Date    APPENDECTOMY      BRONCHOSCOPY Left     A mucous plug was found in the left mainstem bronchus    CHOLECYSTECTOMY      HYSTERECTOMY      KNEE ARTHROSCOPY W/ DEBRIDEMENT Right 12/08/2008    of medial meniscus, the lateral meniscus; chondroplasty of the lateral tibial condyle,the medial femoral condyle, and large patellar plica    ORIF PELVIC FRACTURE Left 07/07/2021    ORIF left anterior Column and wall acetabulum fracture c/b Wound dehiscence requiring debridement    PELVIS DEBRIDEMENT Left 09/15/2021    Irrigation  and excisional debridement of skin, subcutaneous tissue, fat,fascia, of postoperative surgical wound infection [id number]: Saucerization of left iliac crest/pelvis bone. Left hip arthrocentesis. Drainage of left pelvic abscess. Application of negative pressure wound VAC. Secondary complex wound closure    PELVIS DEBRIDEMENT Left 10/14/2021    Irrigation and excisional debridement of pelvic abscess. Wound closure of left pelvis. Debridement/saucerization of iliac wing    TOTAL HIP ARTHROPLASTY Left 05/17/2024    TOTAL KNEE ARTHROPLASTY Right 11/2012        Social History  She reports that she has quit smoking. Her smoking use included cigarettes. She has never used smokeless tobacco. She reports current drug use. Drug: Marijuana. She reports that she does not drink alcohol.    Family History  Family History   Problem Relation Name Age of Onset    Anesthesia problems Mother          delayed emergence        Allergies  Iodine and Latex    Review of Systems   Constitutional: Negative.    HENT: Negative.     Eyes: Negative.    Respiratory: Negative.     Cardiovascular: Negative.    Gastrointestinal: Negative.    Endocrine: Negative.    Genitourinary: Negative.    Musculoskeletal:  Positive for arthralgias, gait problem, joint swelling and myalgias.        Right knee pain, 'loose' 'unstable' feeling. Patient states most of her pain is on the medial aspect. C/o some edema as well.   No hx of falls.    Skin: Negative.    Allergic/Immunologic: Negative.    Hematological: Negative.    Psychiatric/Behavioral: Negative.     All other systems reviewed and are negative.       Physical Exam  Vitals and nursing note reviewed.   Constitutional:       Appearance: Normal appearance.   HENT:      Head: Normocephalic.      Nose: Nose normal.      Mouth/Throat:      Comments:   Mallampati: 2  TMD: >3  Finger breadth: 3  Dentition:  upper full denture, lower partial  Neck ROM: full   Eyes:      Pupils: Pupils are equal, round, and  "reactive to light.   Cardiovascular:      Rate and Rhythm: Normal rate and regular rhythm.      Heart sounds: Normal heart sounds, S1 normal and S2 normal.   Pulmonary:      Effort: Pulmonary effort is normal.      Breath sounds: Normal breath sounds.      Comments: Lungs clear throughout all fields.   Abdominal:      General: Bowel sounds are normal.      Palpations: Abdomen is soft.      Comments: Bowel sounds active x4 quads    Musculoskeletal:         General: Normal range of motion.      Cervical back: Normal range of motion.      Right lower leg: No edema.      Left lower leg: No edema.      Comments: Localized right knee edema.    Skin:     General: Skin is warm and dry.   Neurological:      General: No focal deficit present.      Mental Status: She is alert and oriented to person, place, and time.   Psychiatric:         Mood and Affect: Mood normal.         Behavior: Behavior normal.         Thought Content: Thought content normal.         Judgment: Judgment normal.          Last Recorded Vitals  Blood pressure 120/74, pulse 70, temperature 36.3 °C (97.4 °F), temperature source Temporal, resp. rate 20, height 1.727 m (5' 8\"), weight 117 kg (258 lb 3.2 oz), SpO2 99%.      DASI Risk Score      Flowsheet Row Pre-Admission Testing from 6/2/2025 in  [facility]   Can you take care of yourself (eat, dress, bathe, or use toilet)?  2.75 filed at 06/02/2025 1021   Can you walk indoors, such as around your house? 1.75 filed at 06/02/2025 1021   Can you walk a block or two on level ground?  0 filed at 06/02/2025 1021   Can you climb a flight of stairs or walk up a hill? 0 filed at 06/02/2025 1021   Can you run a short distance? 0 filed at 06/02/2025 1021   Can you do light work around the house like dusting or washing dishes? 2.7 filed at 06/02/2025 1021   Can you do moderate work around the house like vacuuming, sweeping floors or carrying groceries? 3.5 filed at 06/02/2025 1021   Can you do heavy work " around the house like scrubbing floors or lifting and moving heavy furniture?  0 filed at 06/02/2025 1021   Can you do yard work like raking leaves, weeding or pushing a mower? 0 filed at 06/02/2025 1021   Can you participate in moderate recreational activities like golf, bowling, dancing, doubles tennis or throwing a baseball or football? 0 filed at 06/02/2025 1021   Can you participate in strenous sports like swimming, singles tennis, football, basketball, or skiing? 0 filed at 06/02/2025 1021          Caprini DVT Assessment      Flowsheet Row Pre-Admission Testing from 6/2/2025 in  [facility] Admission (Discharged) from 5/17/2024 in [facility] 2 with Ramya Rendon, DO   DVT Score (IF A SCORE IS NOT CALCULATING, MUST SELECT A BMI TO COMPLETE) 14 filed at 06/02/2025 0957 12 filed at 05/16/2024 0900   Medical Factors History of DVT/PE filed at 06/02/2025 0957 --   Surgical Factors Major surgery planned, including arthroscopic and laproscopic (1-2 hours), Elective major lower extremity arthroplasty filed at 06/02/2025 0957 --   BMI (BMI MUST BE CHOSEN) 31-40 (Obesity) filed at 06/02/2025 0957 31-40 (Obesity) filed at 05/16/2024 0900   RETIRED: Current Status -- Elective major lower extremity arthroplasty filed at 05/16/2024 0900   RETIRED: History -- SVT, DVT/PE filed at 05/16/2024 0900   RETIRED: Age -- 40-59 years filed at 05/16/2024 0900          Modified Frailty Index    No data to display       [id number] Stroke Risk Points  Current as of just now        N/A 0 to 9 Points:      Last Change: N/A          The [id number] risk score (Lip TIGRE, et al. 2009. © 2010 American [employer]) quantifies the risk of stroke for a patient with atrial fibrillation. For patients without atrial fibrillation or under the age of 18 this score appears as N/A. Higher score values generally indicate higher risk of stroke.        This score is not applicable to this patient. Components  are not calculated.          Revised Cardiac Risk Index      Flowsheet Row Pre-Admission Testing from 6/2/2025 in  [facility]   High-Risk Surgery (Intraperitoneal, Intrathoracic,Suprainguinal vascular) 1 filed at 06/02/2025 0958   History of ischemic heart disease (History of MI, History of positive exercuse test, Current chest paint considered due to myocardial ischemia, Use of nitrate therapy, ECG with pathological Q Waves) 0 filed at 06/02/2025 0958   History of congestive heart failure (pulmonary edemia, bilateral rales or S3 gallop, Paroxysmal nocturnal dyspnea, CXR showing pulmonary vascular redistribution) 0 filed at 06/02/2025 0958   History of cerebrovascular disease (Prior TIA or stroke) 0 filed at 06/02/2025 0958   Pre-operative insulin treatment 0 filed at 06/02/2025 0958   Pre-operative creatinine>2 mg/dl 0 filed at 06/02/2025 0958   Revised Cardiac Risk Calculator 1 filed at 06/02/2025 0958          Apfel Simplified Score      Flowsheet Row Pre-Admission Testing from 6/2/2025 in  [facility]   Smoking status 0 filed at 06/02/2025 0958   History of motion sickness or PONV  0 filed at 06/02/2025 0958   Use of postoperative opioids 1 filed at 06/02/2025 0958   Gender - Female 1=Yes filed at 06/02/2025 0958   Apfel Simplified Score Calculator 2 filed at 06/02/2025 0958          Risk Analysis Index Results This Encounter    No data found in the last 10 encounters.       Stop Bang Score      Flowsheet Row Pre-Admission Testing from 6/2/2025 in  [facility] Admission (Discharged) from 5/17/2024 in [facility] 2 with Ramya Rendon, DO   Do you snore loudly? 0 filed at 06/02/2025 1020 1 filed at 05/17/2024 1037   Do you often feel tired or fatigued after your sleep? 0 filed at 06/02/2025 1020 0 filed at 05/17/2024 1037   Has anyone ever observed you stop breathing in your sleep? 0 filed at 06/02/2025 1020 0  [patient diagnosed with sleep apnea in past,  re tested, no longer has sleep apnea] filed at 05/17/2024 1037   Do you have or are you being treated for high blood pressure? 0 filed at 06/02/2025 1020 1 filed at 05/17/2024 1037   Recent BMI (Calculated) 39.3 filed at 06/02/2025 1020 36.8 filed at 05/17/2024 1037   Is BMI greater than 35 kg/m2? 1=Yes filed at 06/02/2025 1020 1=Yes filed at 05/17/2024 1037   Age older than 50 years old? 0=No filed at 06/02/2025 1020 0=No filed at 05/17/2024 1037   Is your neck circumference greater than 17 inches (Male) or 16 inches (Female)? 1 filed at 06/02/2025 1020 1 filed at 05/17/2024 1037   Gender - Male 0=No filed at 06/02/2025 1020 0=No filed at 05/17/2024 1037   STOP-BANG Total Score 2 filed at 06/02/2025 1020 4 filed at 05/17/2024 1037          Prodigy: High Risk  Total Score: 0          ARISCAT Score for Postoperative Pulmonary Complications      Flowsheet Row Pre-Admission Testing from 6/2/2025 in  [facility]   Age Calculated Score 0 filed at 06/02/2025 0958   Preoperative SpO2 0 filed at 06/02/2025 0958   Respiratory infection in the last month Either upper or lower (i.e., URI, bronchitis, pneumonia), with fever and antibiotic treatment 0 filed at 06/02/2025 0958   Preoperative anemia (Hgb less than 10 g/dl) 0 filed at 06/02/2025 0958   Surgical incision  0 filed at 06/02/2025 0958   Duration of surgery  0 filed at 06/02/2025 0958   Emergency Procedure  0 filed at 06/02/2025 0958   ARISCAT Total Score  0 filed at 06/02/2025 0958          Villarreal Perioperative Risk for Myocardial Infarction or Cardiac Arrest (KARLI)      Flowsheet Row Pre-Admission Testing from 6/2/2025 in  [facility]   Calculated Age Score 0.94 filed at 06/02/2025 0958   Functional Status  0 filed at 06/02/2025 0958   ASA Class  -1.92 filed at 06/02/2025 0958   Creatinine -0.10 filed at 06/02/2025 0958   Type of Procedure  0.80 filed at 06/02/2025 0958   KARLI Total Score  -5.53 filed at 06/02/2025 0958   KARLI % 0.4 filed at  06/02/2025 0958              Relevant Results  Results for orders placed or performed in visit on 06/02/25 (from the past 24 hours)   Type And Screen Is this order related to pregnancy or an upcoming surgery? Yes; Where will this surgery/delivery be performed? [facility]; What is the date of the surgery? 6/11/2025; Has this patient ever had a transfusion? No; Has thi...   Result Value Ref Range    ABO TYPE O     Rh TYPE POS     ANTIBODY SCREEN NEG                Problem List Items Addressed This Visit       Obstructive sleep apnea syndrome    Relevant Orders    ECG 12 Lead    XR chest 2 views    Lupus erythematosus    Relevant Orders    ECG 12 Lead    XR chest 2 views    Chronic obstructive pulmonary disease, unspecified    Relevant Orders    ECG 12 Lead    XR chest 2 views    Loosening of knee joint prosthesis    Relevant Medications    chlorhexidine (Peridex) 0.12 % solution    Other Relevant Orders    ECG 12 Lead    XR chest 2 views    Staphylococcus aureus/MRSA colonization, Culture     Other Visit Diagnoses         Pre-op evaluation    -  Primary    Relevant Medications    chlorhexidine (Peridex) 0.12 % solution    Other Relevant Orders    ECG 12 Lead    XR chest 2 views    Staphylococcus aureus/MRSA colonization, Culture          hx of right knee replacement with subsequent loosening of joint. Scheduled for right knee revision arthroplasty, both components under general/spinal anesthesia per Dr. Rendon on 6/11/25.   CBC, BMP completed on 5/13/25. Reviewed and these are WNL and acceptable for upcoming surgery.   MRSA ordered. Results are still pending.   T&C ordered. Result pending.   Hx of DASHA, Lupus. EKG ordered, shows SR, with Right BBB. Also noted as far back as 2014 EKG's.   CXR ordered. Result is still pending.   Last A1c was 5.4%, 5/13/25.   H&P and airway assessment completed today.  Please use inhalers morning of surgery.  5/6/25 visit with pulmonary/Dr. King, reviewed note  Patient has  Rheumatologic appointment scheduled for 6/5/25.   Peridex prescription sent to [facility] in [place].  Has Hibiclens.   Did not need to attend the total joint class as she's already had a replacement.   All surgery instructions reviewed with patient by RN. Verbalized understanding.   Encouraged early ambulation, DVT/ PE prevention, constipation prevention, and C&DB post operatively. Patient verbalized understanding.   Please note, patient has a hx of PONV.   Patient states she also has hx of x2 DVT's post op total knee replacement in the past. She states she informed Dr. Rendon's office and they are planning on placing her on Plavix postoperatively.         Princess Alvarez, APRN-CNS

## 2025-06-02 NOTE — PREPROCEDURE INSTRUCTIONS
Medication List            Accurate as of June 2, 2025 11:01 AM. Always use your most recent med list.                * albuterol 90 mcg/actuation inhaler  Commonly known as: ProAir HFA  Inhale 2 puffs every 4 hours if needed for wheezing.     * albuterol 2.5 mg /3 mL (0.083 %) nebulizer solution  Take 3 mL (2.5 mg) by nebulization 4 times a day as needed for wheezing or shortness of breath.     * albuterol 90 mcg/actuation inhaler  Commonly known as: ProAir HFA  Inhale 2 puffs every 4 hours if needed for wheezing or shortness of breath.     cetirizine 10 mg tablet  Commonly known as: ZyrTEC  Take 1 tablet (10 mg) by mouth once daily.     chlorhexidine 0.12 % solution  Commonly known as: Peridex  Swish and Spit 15 ml the night before and the morning of surgery. (2 Doses)     cholecalciferol 25 mcg (1,000 units) capsule  Commonly known as: Vitamin D-3     fluticasone 50 mcg/actuation nasal spray  Commonly known as: Flonase  Administer 2 sprays into each nostril once daily. Shake gently. Before first use, prime pump. After use, clean tip and replace cap.     fluticasone propion-salmeteroL 500-50 mcg/dose diskus inhaler  Commonly known as: Wixela Inhub  Inhale 1 puff 2 times a day. Rinse mouth with water after use to reduce aftertaste and incidence of candidiasis. Do not swallow.     * QUEtiapine 400 mg tablet  Commonly known as: SEROquel     * QUEtiapine 25 mg tablet  Commonly known as: SEROquel           * This list has 5 medication(s) that are the same as other medications prescribed for you. Read the directions carefully, and ask your doctor or other care provider to review them with you.                                  NPO Instructions:    Do not eat any food after midnight the night before your surgery/procedure.    Additional Instructions:     Wear  comfortable loose fitting clothing  Do not use moisturizers, creams, lotions or perfume  All jewelry and valuables should be left at home    Must have a   when you go home  May take seroquel in the morning of surgery with a sip of water  May use inhaler in the morning of surgery  stop vitamins one week prior to surgery  Use hibiclens soap for 5 days prior to surgery including the morning of surgery  Use peridex mouth wash the night before and themorning of surgery              Swish and spit

## 2025-06-03 DIAGNOSIS — J44.9 CHRONIC OBSTRUCTIVE PULMONARY DISEASE, UNSPECIFIED COPD TYPE (MULTI): ICD-10-CM

## 2025-06-03 DIAGNOSIS — J45.40 MODERATE PERSISTENT ASTHMA WITHOUT COMPLICATION (HHS-HCC): Primary | ICD-10-CM

## 2025-06-03 RX ORDER — BUDESONIDE AND FORMOTEROL FUMARATE DIHYDRATE 160; 4.5 UG/1; UG/1
2 AEROSOL RESPIRATORY (INHALATION)
Qty: 10.2 G | Refills: 11 | Status: SHIPPED | OUTPATIENT
Start: 2025-06-03

## 2025-06-04 ENCOUNTER — TELEPHONE (OUTPATIENT)
Dept: PULMONOLOGY | Facility: HOSPITAL | Age: 45
End: 2025-06-04
Payer: MEDICARE

## 2025-06-04 LAB — STAPHYLOCOCCUS SPEC CULT: NORMAL

## 2025-06-04 NOTE — TELEPHONE ENCOUNTER
Patient acknowledged understanding. All questions answered at this time.   ----- Message from Edilberto King sent at 6/3/2025 12:24 PM EDT -----  That is likely a class effect due to side effect of the bronchodilators. I can order a different inhaler for trial if she tolerates that better. I am sending the alternative Breyna.  ----- Message -----  From: Kim Gallego RN  Sent: 6/3/2025   9:50 AM EDT  To: Edilberto King MD    Patient states the wixela makes her heart race and she is not taking it. She is requesting to try something else.   ----- Message -----  From: Edilberto King MD  Sent: 6/3/2025   9:06 AM EDT  To: Kim Gallego RN    The PFTs are consistent with her hx of Asthma and COPD. No significant change from the last PFTs is noted. There is significant air trapping is noted. I highly recommend her to continue to take her   daily inhaler that we prescribed. She should be taking wixela 1 puff twice daily and if that is not covered by insurance, we can prescribe a different inhaler.   ----- Message -----  From: Mandi Pft Results In  Sent: 5/19/2025   4:47 PM EDT  To: Edilberto King MD

## 2025-06-04 NOTE — TELEPHONE ENCOUNTER
Patient acknowledged understanding. All questions answered at this time.     ----- Message from Edilberto King sent at 6/3/2025  9:04 AM EDT -----  The brief allergy panel checked was normal.   Her Alpha 1 enzyme phenotype is MS instead of normal MM. There is no indication to do any treatment in that regard at present but I highly recommend to avoid any cigarette smoking or vaping of   nicotine or other substances. I will discuss further on her upcoming appt.   ----- Message -----  From: Mandi E Ink Results In  Sent: 5/14/2025   5:58 PM EDT  To: Edilberto King MD

## 2025-06-09 ENCOUNTER — ANESTHESIA EVENT (OUTPATIENT)
Dept: OPERATING ROOM | Facility: HOSPITAL | Age: 45
End: 2025-06-09
Payer: MEDICARE

## 2025-06-11 ENCOUNTER — HOME HEALTH ADMISSION (OUTPATIENT)
Dept: HOME HEALTH SERVICES | Facility: HOME HEALTH | Age: 45
End: 2025-06-11
Payer: MEDICARE

## 2025-06-11 ENCOUNTER — HOSPITAL ENCOUNTER (INPATIENT)
Facility: HOSPITAL | Age: 45
LOS: 2 days | Discharge: HOME | End: 2025-06-13
Attending: ORTHOPAEDIC SURGERY | Admitting: ORTHOPAEDIC SURGERY
Payer: MEDICARE

## 2025-06-11 ENCOUNTER — ANESTHESIA (OUTPATIENT)
Dept: OPERATING ROOM | Facility: HOSPITAL | Age: 45
End: 2025-06-11
Payer: MEDICARE

## 2025-06-11 ENCOUNTER — APPOINTMENT (OUTPATIENT)
Dept: RADIOLOGY | Facility: HOSPITAL | Age: 45
End: 2025-06-11
Payer: MEDICARE

## 2025-06-11 DIAGNOSIS — T84.038A LOOSENING OF KNEE JOINT PROSTHESIS, INITIAL ENCOUNTER: Primary | ICD-10-CM

## 2025-06-11 DIAGNOSIS — Z96.659 LOOSENING OF KNEE JOINT PROSTHESIS, INITIAL ENCOUNTER: Primary | ICD-10-CM

## 2025-06-11 DIAGNOSIS — Z96.659 LOOSENING OF KNEE JOINT PROSTHESIS, SUBSEQUENT ENCOUNTER: ICD-10-CM

## 2025-06-11 DIAGNOSIS — T84.038D LOOSENING OF KNEE JOINT PROSTHESIS, SUBSEQUENT ENCOUNTER: ICD-10-CM

## 2025-06-11 PROCEDURE — 3700000001 HC GENERAL ANESTHESIA TIME - INITIAL BASE CHARGE: Performed by: ORTHOPAEDIC SURGERY

## 2025-06-11 PROCEDURE — 2500000005 HC RX 250 GENERAL PHARMACY W/O HCPCS: Performed by: ORTHOPAEDIC SURGERY

## 2025-06-11 PROCEDURE — 3600000005 HC OR TIME - INITIAL BASE CHARGE - PROCEDURE LEVEL FIVE: Performed by: ORTHOPAEDIC SURGERY

## 2025-06-11 PROCEDURE — 2780000003 HC OR 278 NO HCPCS: Performed by: ORTHOPAEDIC SURGERY

## 2025-06-11 PROCEDURE — 7100000002 HC RECOVERY ROOM TIME - EACH INCREMENTAL 1 MINUTE: Performed by: ORTHOPAEDIC SURGERY

## 2025-06-11 PROCEDURE — 2500000004 HC RX 250 GENERAL PHARMACY W/ HCPCS (ALT 636 FOR OP/ED): Performed by: ORTHOPAEDIC SURGERY

## 2025-06-11 PROCEDURE — 2500000004 HC RX 250 GENERAL PHARMACY W/ HCPCS (ALT 636 FOR OP/ED): Performed by: ANESTHESIOLOGY

## 2025-06-11 PROCEDURE — C1776 JOINT DEVICE (IMPLANTABLE): HCPCS | Performed by: ORTHOPAEDIC SURGERY

## 2025-06-11 PROCEDURE — 7100000001 HC RECOVERY ROOM TIME - INITIAL BASE CHARGE: Performed by: ORTHOPAEDIC SURGERY

## 2025-06-11 PROCEDURE — 3700000002 HC GENERAL ANESTHESIA TIME - EACH INCREMENTAL 1 MINUTE: Performed by: ORTHOPAEDIC SURGERY

## 2025-06-11 PROCEDURE — 2500000004 HC RX 250 GENERAL PHARMACY W/ HCPCS (ALT 636 FOR OP/ED): Performed by: NURSE ANESTHETIST, CERTIFIED REGISTERED

## 2025-06-11 PROCEDURE — 73560 X-RAY EXAM OF KNEE 1 OR 2: CPT | Mod: RT

## 2025-06-11 PROCEDURE — 2500000002 HC RX 250 W HCPCS SELF ADMINISTERED DRUGS (ALT 637 FOR MEDICARE OP, ALT 636 FOR OP/ED): Performed by: STUDENT IN AN ORGANIZED HEALTH CARE EDUCATION/TRAINING PROGRAM

## 2025-06-11 PROCEDURE — RXMED WILLOW AMBULATORY MEDICATION CHARGE

## 2025-06-11 PROCEDURE — 99221 1ST HOSP IP/OBS SF/LOW 40: CPT | Performed by: STUDENT IN AN ORGANIZED HEALTH CARE EDUCATION/TRAINING PROGRAM

## 2025-06-11 PROCEDURE — 27487 REVISE/REPLACE KNEE JOINT: CPT

## 2025-06-11 PROCEDURE — 2500000004 HC RX 250 GENERAL PHARMACY W/ HCPCS (ALT 636 FOR OP/ED): Mod: JZ | Performed by: ANESTHESIOLOGY

## 2025-06-11 PROCEDURE — 2720000007 HC OR 272 NO HCPCS: Performed by: ORTHOPAEDIC SURGERY

## 2025-06-11 PROCEDURE — 73560 X-RAY EXAM OF KNEE 1 OR 2: CPT | Mod: RIGHT SIDE | Performed by: STUDENT IN AN ORGANIZED HEALTH CARE EDUCATION/TRAINING PROGRAM

## 2025-06-11 PROCEDURE — 27487 REVISE/REPLACE KNEE JOINT: CPT | Performed by: ORTHOPAEDIC SURGERY

## 2025-06-11 PROCEDURE — 1100000001 HC PRIVATE ROOM DAILY

## 2025-06-11 PROCEDURE — 2500000001 HC RX 250 WO HCPCS SELF ADMINISTERED DRUGS (ALT 637 FOR MEDICARE OP): Performed by: ORTHOPAEDIC SURGERY

## 2025-06-11 PROCEDURE — A4649 SURGICAL SUPPLIES: HCPCS | Performed by: ORTHOPAEDIC SURGERY

## 2025-06-11 PROCEDURE — 3600000010 HC OR TIME - EACH INCREMENTAL 1 MINUTE - PROCEDURE LEVEL FIVE: Performed by: ORTHOPAEDIC SURGERY

## 2025-06-11 PROCEDURE — 0SRC069 REPLACEMENT OF RIGHT KNEE JOINT WITH OXIDIZED ZIRCONIUM ON POLYETHYLENE SYNTHETIC SUBSTITUTE, CEMENTED, OPEN APPROACH: ICD-10-PCS | Performed by: ORTHOPAEDIC SURGERY

## 2025-06-11 PROCEDURE — 0SPC0JZ REMOVAL OF SYNTHETIC SUBSTITUTE FROM RIGHT KNEE JOINT, OPEN APPROACH: ICD-10-PCS | Performed by: ORTHOPAEDIC SURGERY

## 2025-06-11 DEVICE — REVISION TIBIAL BASEPLATE
Type: IMPLANTABLE DEVICE | Site: KNEE | Status: FUNCTIONAL
Brand: TRIATHLON

## 2025-06-11 DEVICE — TOBRA FULL DOSE ANTIBIOTIC BONE CEMENT, 10 PACK CATALOG NUMBER IS 6197-9-010
Type: IMPLANTABLE DEVICE | Site: KNEE | Status: FUNCTIONAL
Brand: SIMPLEX

## 2025-06-11 DEVICE — TRITANIUM TIBIAL SYMMETRIC CONE AUGMENT
Type: IMPLANTABLE DEVICE | Site: KNEE | Status: FUNCTIONAL
Brand: TRIATHLON

## 2025-06-11 DEVICE — FEMORAL DISTAL AUGMENT
Type: IMPLANTABLE DEVICE | Site: KNEE | Status: FUNCTIONAL
Brand: TRIATHLON

## 2025-06-11 DEVICE — CEMENTED STEM
Type: IMPLANTABLE DEVICE | Site: KNEE | Status: FUNCTIONAL
Brand: TRIATHLON

## 2025-06-11 DEVICE — TOTAL STABILIZER FEMORAL COMPONENT
Type: IMPLANTABLE DEVICE | Site: KNEE | Status: FUNCTIONAL
Brand: TRIATHLON

## 2025-06-11 RX ORDER — ENOXAPARIN SODIUM 100 MG/ML
40 INJECTION SUBCUTANEOUS DAILY
Status: DISCONTINUED | OUTPATIENT
Start: 2025-06-12 | End: 2025-06-13 | Stop reason: HOSPADM

## 2025-06-11 RX ORDER — MEPERIDINE HYDROCHLORIDE 25 MG/ML
12.5 INJECTION INTRAMUSCULAR; INTRAVENOUS; SUBCUTANEOUS EVERY 10 MIN PRN
Status: DISCONTINUED | OUTPATIENT
Start: 2025-06-11 | End: 2025-06-11 | Stop reason: HOSPADM

## 2025-06-11 RX ORDER — TRAMADOL HYDROCHLORIDE 50 MG/1
50 TABLET, FILM COATED ORAL EVERY 6 HOURS PRN
Qty: 28 TABLET | Refills: 0 | Status: SHIPPED | OUTPATIENT
Start: 2025-06-11 | End: 2025-06-20

## 2025-06-11 RX ORDER — ROCURONIUM BROMIDE 10 MG/ML
INJECTION, SOLUTION INTRAVENOUS AS NEEDED
Status: DISCONTINUED | OUTPATIENT
Start: 2025-06-11 | End: 2025-06-11

## 2025-06-11 RX ORDER — CEFAZOLIN SODIUM 2 G/50ML
2 SOLUTION INTRAVENOUS EVERY 8 HOURS
Status: COMPLETED | OUTPATIENT
Start: 2025-06-11 | End: 2025-06-12

## 2025-06-11 RX ORDER — BUPIVACAINE HYDROCHLORIDE 2.5 MG/ML
INJECTION, SOLUTION EPIDURAL; INFILTRATION; INTRACAUDAL; PERINEURAL AS NEEDED
Status: DISCONTINUED | OUTPATIENT
Start: 2025-06-11 | End: 2025-06-11

## 2025-06-11 RX ORDER — OXYCODONE HCL 10 MG/1
10 TABLET, FILM COATED, EXTENDED RELEASE ORAL ONCE
Status: COMPLETED | OUTPATIENT
Start: 2025-06-11 | End: 2025-06-11

## 2025-06-11 RX ORDER — FLUTICASONE FUROATE AND VILANTEROL 200; 25 UG/1; UG/1
1 POWDER RESPIRATORY (INHALATION)
Status: DISCONTINUED | OUTPATIENT
Start: 2025-06-11 | End: 2025-06-13 | Stop reason: HOSPADM

## 2025-06-11 RX ORDER — DIPHENHYDRAMINE HYDROCHLORIDE 50 MG/ML
12.5 INJECTION, SOLUTION INTRAMUSCULAR; INTRAVENOUS ONCE AS NEEDED
Status: DISCONTINUED | OUTPATIENT
Start: 2025-06-11 | End: 2025-06-11 | Stop reason: HOSPADM

## 2025-06-11 RX ORDER — SODIUM CHLORIDE, SODIUM LACTATE, POTASSIUM CHLORIDE, CALCIUM CHLORIDE 600; 310; 30; 20 MG/100ML; MG/100ML; MG/100ML; MG/100ML
100 INJECTION, SOLUTION INTRAVENOUS CONTINUOUS
Status: ACTIVE | OUTPATIENT
Start: 2025-06-11 | End: 2025-06-12

## 2025-06-11 RX ORDER — METOCLOPRAMIDE HYDROCHLORIDE 5 MG/ML
10 INJECTION INTRAMUSCULAR; INTRAVENOUS EVERY 6 HOURS PRN
Status: DISCONTINUED | OUTPATIENT
Start: 2025-06-11 | End: 2025-06-13 | Stop reason: HOSPADM

## 2025-06-11 RX ORDER — SODIUM CHLORIDE, SODIUM LACTATE, POTASSIUM CHLORIDE, CALCIUM CHLORIDE 600; 310; 30; 20 MG/100ML; MG/100ML; MG/100ML; MG/100ML
75 INJECTION, SOLUTION INTRAVENOUS CONTINUOUS
Status: DISCONTINUED | OUTPATIENT
Start: 2025-06-11 | End: 2025-06-11

## 2025-06-11 RX ORDER — FLUTICASONE PROPIONATE 50 MCG
2 SPRAY, SUSPENSION (ML) NASAL DAILY
Status: DISCONTINUED | OUTPATIENT
Start: 2025-06-11 | End: 2025-06-13 | Stop reason: HOSPADM

## 2025-06-11 RX ORDER — NALOXONE HYDROCHLORIDE 0.4 MG/ML
0.2 INJECTION, SOLUTION INTRAMUSCULAR; INTRAVENOUS; SUBCUTANEOUS EVERY 5 MIN PRN
Status: DISCONTINUED | OUTPATIENT
Start: 2025-06-11 | End: 2025-06-13 | Stop reason: HOSPADM

## 2025-06-11 RX ORDER — ACETAMINOPHEN 325 MG/1
650 TABLET ORAL EVERY 6 HOURS SCHEDULED
Status: DISCONTINUED | OUTPATIENT
Start: 2025-06-11 | End: 2025-06-13 | Stop reason: HOSPADM

## 2025-06-11 RX ORDER — BISACODYL 5 MG
10 TABLET, DELAYED RELEASE (ENTERIC COATED) ORAL DAILY PRN
Status: DISCONTINUED | OUTPATIENT
Start: 2025-06-11 | End: 2025-06-13 | Stop reason: HOSPADM

## 2025-06-11 RX ORDER — PANTOPRAZOLE SODIUM 40 MG/1
40 TABLET, DELAYED RELEASE ORAL DAILY PRN
Qty: 30 TABLET | Refills: 0 | Status: SHIPPED | OUTPATIENT
Start: 2025-06-11 | End: 2025-07-13

## 2025-06-11 RX ORDER — ONDANSETRON HYDROCHLORIDE 2 MG/ML
4 INJECTION, SOLUTION INTRAVENOUS ONCE AS NEEDED
Status: COMPLETED | OUTPATIENT
Start: 2025-06-11 | End: 2025-06-11

## 2025-06-11 RX ORDER — ROPIVACAINE/EPI/CLONIDINE/KET 2.46-0.005
SYRINGE (ML) INJECTION AS NEEDED
Status: DISCONTINUED | OUTPATIENT
Start: 2025-06-11 | End: 2025-06-11 | Stop reason: HOSPADM

## 2025-06-11 RX ORDER — ALBUTEROL SULFATE 90 UG/1
2 INHALANT RESPIRATORY (INHALATION) EVERY 4 HOURS PRN
Status: DISCONTINUED | OUTPATIENT
Start: 2025-06-11 | End: 2025-06-13 | Stop reason: HOSPADM

## 2025-06-11 RX ORDER — METOCLOPRAMIDE 10 MG/1
10 TABLET ORAL EVERY 6 HOURS PRN
Status: DISCONTINUED | OUTPATIENT
Start: 2025-06-11 | End: 2025-06-13 | Stop reason: HOSPADM

## 2025-06-11 RX ORDER — MIDAZOLAM HYDROCHLORIDE 1 MG/ML
INJECTION, SOLUTION INTRAMUSCULAR; INTRAVENOUS AS NEEDED
Status: DISCONTINUED | OUTPATIENT
Start: 2025-06-11 | End: 2025-06-11

## 2025-06-11 RX ORDER — QUETIAPINE FUMARATE 100 MG/1
400 TABLET, FILM COATED ORAL NIGHTLY
Status: DISCONTINUED | OUTPATIENT
Start: 2025-06-11 | End: 2025-06-11

## 2025-06-11 RX ORDER — ONDANSETRON 4 MG/1
4 TABLET, ORALLY DISINTEGRATING ORAL EVERY 8 HOURS PRN
Status: DISCONTINUED | OUTPATIENT
Start: 2025-06-11 | End: 2025-06-13 | Stop reason: HOSPADM

## 2025-06-11 RX ORDER — DROPERIDOL 2.5 MG/ML
0.62 INJECTION, SOLUTION INTRAMUSCULAR; INTRAVENOUS ONCE AS NEEDED
Status: DISCONTINUED | OUTPATIENT
Start: 2025-06-11 | End: 2025-06-11 | Stop reason: HOSPADM

## 2025-06-11 RX ORDER — LABETALOL HYDROCHLORIDE 5 MG/ML
5 INJECTION, SOLUTION INTRAVENOUS ONCE AS NEEDED
Status: DISCONTINUED | OUTPATIENT
Start: 2025-06-11 | End: 2025-06-11 | Stop reason: HOSPADM

## 2025-06-11 RX ORDER — OXYCODONE HYDROCHLORIDE 10 MG/1
10 TABLET ORAL EVERY 4 HOURS PRN
Status: DISCONTINUED | OUTPATIENT
Start: 2025-06-11 | End: 2025-06-13 | Stop reason: HOSPADM

## 2025-06-11 RX ORDER — CETIRIZINE HYDROCHLORIDE 10 MG/1
10 TABLET ORAL DAILY
Status: DISCONTINUED | OUTPATIENT
Start: 2025-06-11 | End: 2025-06-13 | Stop reason: HOSPADM

## 2025-06-11 RX ORDER — CYCLOBENZAPRINE HCL 10 MG
5 TABLET ORAL 3 TIMES DAILY PRN
Status: DISCONTINUED | OUTPATIENT
Start: 2025-06-11 | End: 2025-06-13 | Stop reason: HOSPADM

## 2025-06-11 RX ORDER — CEFAZOLIN SODIUM 2 G/50ML
2 SOLUTION INTRAVENOUS ONCE
Status: DISCONTINUED | OUTPATIENT
Start: 2025-06-11 | End: 2025-06-11 | Stop reason: HOSPADM

## 2025-06-11 RX ORDER — DOXYCYCLINE 100 MG/1
100 CAPSULE ORAL 2 TIMES DAILY
Qty: 84 CAPSULE | Refills: 0 | Status: SHIPPED | OUTPATIENT
Start: 2025-06-11 | End: 2025-07-25

## 2025-06-11 RX ORDER — SCOPOLAMINE 1 MG/3D
1 PATCH, EXTENDED RELEASE TRANSDERMAL
Status: DISCONTINUED | OUTPATIENT
Start: 2025-06-11 | End: 2025-06-13 | Stop reason: HOSPADM

## 2025-06-11 RX ORDER — SENNOSIDES 8.6 MG/1
2 TABLET ORAL 2 TIMES DAILY
Status: DISCONTINUED | OUTPATIENT
Start: 2025-06-11 | End: 2025-06-13 | Stop reason: HOSPADM

## 2025-06-11 RX ORDER — ALBUTEROL SULFATE 0.83 MG/ML
2.5 SOLUTION RESPIRATORY (INHALATION) 4 TIMES DAILY PRN
Status: DISCONTINUED | OUTPATIENT
Start: 2025-06-11 | End: 2025-06-13 | Stop reason: HOSPADM

## 2025-06-11 RX ORDER — LIDOCAINE HYDROCHLORIDE 10 MG/ML
0.1 INJECTION, SOLUTION EPIDURAL; INFILTRATION; INTRACAUDAL; PERINEURAL ONCE
Status: DISCONTINUED | OUTPATIENT
Start: 2025-06-11 | End: 2025-06-11 | Stop reason: HOSPADM

## 2025-06-11 RX ORDER — ALBUTEROL SULFATE 0.83 MG/ML
2.5 SOLUTION RESPIRATORY (INHALATION) ONCE AS NEEDED
Status: DISCONTINUED | OUTPATIENT
Start: 2025-06-11 | End: 2025-06-11 | Stop reason: HOSPADM

## 2025-06-11 RX ORDER — SODIUM CHLORIDE, SODIUM LACTATE, POTASSIUM CHLORIDE, CALCIUM CHLORIDE 600; 310; 30; 20 MG/100ML; MG/100ML; MG/100ML; MG/100ML
100 INJECTION, SOLUTION INTRAVENOUS CONTINUOUS
Status: DISCONTINUED | OUTPATIENT
Start: 2025-06-11 | End: 2025-06-11 | Stop reason: HOSPADM

## 2025-06-11 RX ORDER — PROPOFOL 10 MG/ML
INJECTION, EMULSION INTRAVENOUS AS NEEDED
Status: DISCONTINUED | OUTPATIENT
Start: 2025-06-11 | End: 2025-06-11

## 2025-06-11 RX ORDER — ACETAMINOPHEN 325 MG/1
975 TABLET ORAL ONCE
Status: COMPLETED | OUTPATIENT
Start: 2025-06-11 | End: 2025-06-11

## 2025-06-11 RX ORDER — CYCLOBENZAPRINE HCL 5 MG
5 TABLET ORAL 3 TIMES DAILY PRN
Qty: 30 TABLET | Refills: 0 | Status: SHIPPED | OUTPATIENT
Start: 2025-06-11 | End: 2025-06-23

## 2025-06-11 RX ORDER — ONDANSETRON HYDROCHLORIDE 2 MG/ML
4 INJECTION, SOLUTION INTRAVENOUS EVERY 8 HOURS PRN
Status: DISCONTINUED | OUTPATIENT
Start: 2025-06-11 | End: 2025-06-13 | Stop reason: HOSPADM

## 2025-06-11 RX ORDER — QUETIAPINE FUMARATE 25 MG/1
25 TABLET, FILM COATED ORAL DAILY PRN
Status: DISCONTINUED | OUTPATIENT
Start: 2025-06-12 | End: 2025-06-11

## 2025-06-11 RX ORDER — OXYCODONE AND ACETAMINOPHEN 5; 325 MG/1; MG/1
1 TABLET ORAL EVERY 4 HOURS PRN
Status: DISCONTINUED | OUTPATIENT
Start: 2025-06-11 | End: 2025-06-11 | Stop reason: HOSPADM

## 2025-06-11 RX ORDER — QUETIAPINE FUMARATE 25 MG/1
25 TABLET, FILM COATED ORAL DAILY
Status: DISCONTINUED | OUTPATIENT
Start: 2025-06-12 | End: 2025-06-11

## 2025-06-11 RX ORDER — VANCOMYCIN HYDROCHLORIDE 1 G/200ML
1 INJECTION, SOLUTION INTRAVENOUS ONCE
Status: COMPLETED | OUTPATIENT
Start: 2025-06-11 | End: 2025-06-11

## 2025-06-11 RX ORDER — LIDOCAINE HYDROCHLORIDE 20 MG/ML
INJECTION, SOLUTION INFILTRATION; PERINEURAL AS NEEDED
Status: DISCONTINUED | OUTPATIENT
Start: 2025-06-11 | End: 2025-06-11

## 2025-06-11 RX ORDER — MORPHINE SULFATE 2 MG/ML
2 INJECTION, SOLUTION INTRAMUSCULAR; INTRAVENOUS EVERY 5 MIN PRN
Status: DISCONTINUED | OUTPATIENT
Start: 2025-06-11 | End: 2025-06-11 | Stop reason: HOSPADM

## 2025-06-11 RX ORDER — FENTANYL CITRATE 50 UG/ML
INJECTION, SOLUTION INTRAMUSCULAR; INTRAVENOUS AS NEEDED
Status: DISCONTINUED | OUTPATIENT
Start: 2025-06-11 | End: 2025-06-11

## 2025-06-11 RX ORDER — ONDANSETRON HYDROCHLORIDE 2 MG/ML
4 INJECTION, SOLUTION INTRAVENOUS ONCE
Status: COMPLETED | OUTPATIENT
Start: 2025-06-11 | End: 2025-06-11

## 2025-06-11 RX ORDER — SENNOSIDES 8.6 MG/1
1 TABLET ORAL 2 TIMES DAILY
Qty: 60 TABLET | Refills: 0 | Status: SHIPPED | OUTPATIENT
Start: 2025-06-11 | End: 2025-07-13

## 2025-06-11 RX ORDER — HYDRALAZINE HYDROCHLORIDE 20 MG/ML
5 INJECTION INTRAMUSCULAR; INTRAVENOUS EVERY 30 MIN PRN
Status: DISCONTINUED | OUTPATIENT
Start: 2025-06-11 | End: 2025-06-11 | Stop reason: HOSPADM

## 2025-06-11 RX ORDER — PANTOPRAZOLE SODIUM 40 MG/1
40 TABLET, DELAYED RELEASE ORAL
Status: DISCONTINUED | OUTPATIENT
Start: 2025-06-12 | End: 2025-06-13 | Stop reason: HOSPADM

## 2025-06-11 RX ORDER — ROPIVACAINE HYDROCHLORIDE 5 MG/ML
INJECTION, SOLUTION EPIDURAL; INFILTRATION; PERINEURAL AS NEEDED
Status: DISCONTINUED | OUTPATIENT
Start: 2025-06-11 | End: 2025-06-11

## 2025-06-11 RX ORDER — CEFAZOLIN 1 G/1
INJECTION, POWDER, FOR SOLUTION INTRAVENOUS AS NEEDED
Status: DISCONTINUED | OUTPATIENT
Start: 2025-06-11 | End: 2025-06-11

## 2025-06-11 RX ORDER — FAMOTIDINE 10 MG/ML
20 INJECTION, SOLUTION INTRAVENOUS ONCE
Status: COMPLETED | OUTPATIENT
Start: 2025-06-11 | End: 2025-06-11

## 2025-06-11 RX ORDER — HYDROMORPHONE HYDROCHLORIDE 1 MG/ML
INJECTION, SOLUTION INTRAMUSCULAR; INTRAVENOUS; SUBCUTANEOUS AS NEEDED
Status: DISCONTINUED | OUTPATIENT
Start: 2025-06-11 | End: 2025-06-11

## 2025-06-11 RX ORDER — VANCOMYCIN HYDROCHLORIDE 1 G/20ML
1 INJECTION, POWDER, LYOPHILIZED, FOR SOLUTION INTRAVENOUS ONCE
Status: COMPLETED | OUTPATIENT
Start: 2025-06-11 | End: 2025-06-11

## 2025-06-11 RX ORDER — PREGABALIN 75 MG/1
75 CAPSULE ORAL ONCE
Status: COMPLETED | OUTPATIENT
Start: 2025-06-11 | End: 2025-06-11

## 2025-06-11 RX ORDER — LIDOCAINE HYDROCHLORIDE AND EPINEPHRINE 10; 10 UG/ML; MG/ML
INJECTION, SOLUTION INFILTRATION; PERINEURAL AS NEEDED
Status: DISCONTINUED | OUTPATIENT
Start: 2025-06-11 | End: 2025-06-11

## 2025-06-11 RX ORDER — QUETIAPINE FUMARATE 25 MG/1
25 TABLET, FILM COATED ORAL DAILY PRN
Status: DISCONTINUED | OUTPATIENT
Start: 2025-06-11 | End: 2025-06-13 | Stop reason: HOSPADM

## 2025-06-11 RX ORDER — QUETIAPINE FUMARATE 100 MG/1
200 TABLET, FILM COATED ORAL NIGHTLY
Status: DISCONTINUED | OUTPATIENT
Start: 2025-06-11 | End: 2025-06-13 | Stop reason: HOSPADM

## 2025-06-11 RX ORDER — OXYCODONE HYDROCHLORIDE 5 MG/1
5 TABLET ORAL EVERY 6 HOURS PRN
Status: DISCONTINUED | OUTPATIENT
Start: 2025-06-11 | End: 2025-06-13 | Stop reason: HOSPADM

## 2025-06-11 RX ORDER — TRANEXAMIC ACID 1 G/10ML
1000 INJECTION, SOLUTION INTRAVENOUS 2 TIMES DAILY
Status: COMPLETED | OUTPATIENT
Start: 2025-06-11 | End: 2025-06-11

## 2025-06-11 RX ORDER — ACETAMINOPHEN 325 MG/1
1000 TABLET ORAL EVERY 8 HOURS PRN
Qty: 90 TABLET | Refills: 1 | Status: SHIPPED | OUTPATIENT
Start: 2025-06-11

## 2025-06-11 RX ORDER — OXYCODONE HYDROCHLORIDE 5 MG/1
5 TABLET ORAL EVERY 6 HOURS PRN
Qty: 28 TABLET | Refills: 0 | Status: SHIPPED | OUTPATIENT
Start: 2025-06-11 | End: 2025-06-20 | Stop reason: SDUPTHER

## 2025-06-11 RX ORDER — CELECOXIB 200 MG/1
400 CAPSULE ORAL ONCE
Status: COMPLETED | OUTPATIENT
Start: 2025-06-11 | End: 2025-06-11

## 2025-06-11 RX ADMIN — ROPIVACAINE HYDROCHLORIDE 20 ML: 5 INJECTION, SOLUTION EPIDURAL; INFILTRATION; PERINEURAL at 11:49

## 2025-06-11 RX ADMIN — CELECOXIB 400 MG: 200 CAPSULE ORAL at 11:05

## 2025-06-11 RX ADMIN — FENTANYL CITRATE 100 MCG: 50 INJECTION INTRAMUSCULAR; INTRAVENOUS at 11:43

## 2025-06-11 RX ADMIN — ONDANSETRON 4 MG: 2 INJECTION, SOLUTION INTRAMUSCULAR; INTRAVENOUS at 16:48

## 2025-06-11 RX ADMIN — SUGAMMADEX 200 MG: 100 INJECTION, SOLUTION INTRAVENOUS at 15:00

## 2025-06-11 RX ADMIN — FENTANYL CITRATE 50 MCG: 50 INJECTION INTRAMUSCULAR; INTRAVENOUS at 12:07

## 2025-06-11 RX ADMIN — SENNOSIDES 17.2 MG: 8.6 TABLET, FILM COATED ORAL at 20:52

## 2025-06-11 RX ADMIN — QUETIAPINE FUMARATE 25 MG: 25 TABLET ORAL at 18:33

## 2025-06-11 RX ADMIN — Medication 2 L/MIN: at 16:37

## 2025-06-11 RX ADMIN — VANCOMYCIN HYDROCHLORIDE 1 G: 1 INJECTION, SOLUTION INTRAVENOUS at 12:00

## 2025-06-11 RX ADMIN — DEXAMETHASONE SODIUM PHOSPHATE 4 MG: 4 INJECTION INTRA-ARTICULAR; INTRALESIONAL; INTRAMUSCULAR; INTRAVENOUS; SOFT TISSUE at 11:49

## 2025-06-11 RX ADMIN — TRANEXAMIC ACID 1000 MG: 100 INJECTION, SOLUTION INTRAVENOUS at 12:18

## 2025-06-11 RX ADMIN — HYDROMORPHONE HYDROCHLORIDE 0.5 MG: 0.5 INJECTION, SOLUTION INTRAMUSCULAR; INTRAVENOUS; SUBCUTANEOUS at 15:20

## 2025-06-11 RX ADMIN — FENTANYL CITRATE 50 MCG: 50 INJECTION INTRAMUSCULAR; INTRAVENOUS at 13:51

## 2025-06-11 RX ADMIN — OXYCODONE HYDROCHLORIDE 10 MG: 5 TABLET ORAL at 16:46

## 2025-06-11 RX ADMIN — HYDROMORPHONE HYDROCHLORIDE 0.5 MG: 1 INJECTION INTRAMUSCULAR; INTRAVENOUS; SUBCUTANEOUS at 15:06

## 2025-06-11 RX ADMIN — ROCURONIUM BROMIDE 50 MG: 10 INJECTION, SOLUTION INTRAVENOUS at 12:07

## 2025-06-11 RX ADMIN — SODIUM CHLORIDE, POTASSIUM CHLORIDE, SODIUM LACTATE AND CALCIUM CHLORIDE: 600; 310; 30; 20 INJECTION, SOLUTION INTRAVENOUS at 12:00

## 2025-06-11 RX ADMIN — FENTANYL CITRATE 50 MCG: 50 INJECTION INTRAMUSCULAR; INTRAVENOUS at 12:02

## 2025-06-11 RX ADMIN — SCOPOLAMINE 1 PATCH: 1.5 PATCH, EXTENDED RELEASE TRANSDERMAL at 11:05

## 2025-06-11 RX ADMIN — ONDANSETRON 4 MG: 2 INJECTION, SOLUTION INTRAMUSCULAR; INTRAVENOUS at 15:23

## 2025-06-11 RX ADMIN — CEFAZOLIN 2 G: 1 INJECTION, POWDER, FOR SOLUTION INTRAMUSCULAR; INTRAVENOUS at 12:00

## 2025-06-11 RX ADMIN — LIDOCAINE HYDROCHLORIDE 60 MG: 20 INJECTION, SOLUTION INFILTRATION; PERINEURAL at 12:07

## 2025-06-11 RX ADMIN — FAMOTIDINE 20 MG: 10 INJECTION, SOLUTION INTRAVENOUS at 11:04

## 2025-06-11 RX ADMIN — FENTANYL CITRATE 50 MCG: 50 INJECTION INTRAMUSCULAR; INTRAVENOUS at 14:11

## 2025-06-11 RX ADMIN — DEXAMETHASONE SODIUM PHOSPHATE 4 MG: 4 INJECTION INTRA-ARTICULAR; INTRALESIONAL; INTRAMUSCULAR; INTRAVENOUS; SOFT TISSUE at 11:54

## 2025-06-11 RX ADMIN — ACETAMINOPHEN 975 MG: 325 TABLET ORAL at 11:06

## 2025-06-11 RX ADMIN — PROPOFOL 200 MG: 10 INJECTION, EMULSION INTRAVENOUS at 12:07

## 2025-06-11 RX ADMIN — ONDANSETRON 4 MG: 2 INJECTION, SOLUTION INTRAMUSCULAR; INTRAVENOUS at 14:52

## 2025-06-11 RX ADMIN — TRANEXAMIC ACID 1000 MG: 100 INJECTION, SOLUTION INTRAVENOUS at 14:51

## 2025-06-11 RX ADMIN — LIDOCAINE HYDROCHLORIDE,EPINEPHRINE BITARTRATE 5 ML: 10; .01 INJECTION, SOLUTION INFILTRATION; PERINEURAL at 11:49

## 2025-06-11 RX ADMIN — QUETIAPINE FUMARATE 200 MG: 100 TABLET ORAL at 20:59

## 2025-06-11 RX ADMIN — MIDAZOLAM 2 MG: 1 INJECTION INTRAMUSCULAR; INTRAVENOUS at 11:43

## 2025-06-11 RX ADMIN — OXYCODONE HYDROCHLORIDE 10 MG: 5 TABLET ORAL at 21:07

## 2025-06-11 RX ADMIN — BUPIVACAINE HYDROCHLORIDE 20 ML: 2.5 INJECTION, SOLUTION EPIDURAL; INFILTRATION; INTRACAUDAL; PERINEURAL at 11:54

## 2025-06-11 RX ADMIN — PREGABALIN 75 MG: 75 CAPSULE ORAL at 11:06

## 2025-06-11 RX ADMIN — SODIUM CHLORIDE, SODIUM LACTATE, POTASSIUM CHLORIDE, AND CALCIUM CHLORIDE 100 ML/HR: .6; .31; .03; .02 INJECTION, SOLUTION INTRAVENOUS at 16:37

## 2025-06-11 RX ADMIN — HYDROMORPHONE HYDROCHLORIDE 0.5 MG: 1 INJECTION INTRAMUSCULAR; INTRAVENOUS; SUBCUTANEOUS at 15:08

## 2025-06-11 RX ADMIN — CEFAZOLIN SODIUM 2 G: 2 SOLUTION INTRAVENOUS at 20:51

## 2025-06-11 RX ADMIN — LIDOCAINE HYDROCHLORIDE,EPINEPHRINE BITARTRATE 5 ML: 10; .01 INJECTION, SOLUTION INFILTRATION; PERINEURAL at 11:54

## 2025-06-11 RX ADMIN — OXYCODONE HYDROCHLORIDE 10 MG: 10 TABLET, FILM COATED, EXTENDED RELEASE ORAL at 11:06

## 2025-06-11 RX ADMIN — ACETAMINOPHEN 650 MG: 325 TABLET ORAL at 17:51

## 2025-06-11 SDOH — ECONOMIC STABILITY: FOOD INSECURITY: WITHIN THE PAST 12 MONTHS, YOU WORRIED THAT YOUR FOOD WOULD RUN OUT BEFORE YOU GOT THE MONEY TO BUY MORE.: NEVER TRUE

## 2025-06-11 SDOH — ECONOMIC STABILITY: HOUSING INSECURITY: AT ANY TIME IN THE PAST 12 MONTHS, WERE YOU HOMELESS OR LIVING IN A SHELTER (INCLUDING NOW)?: NO

## 2025-06-11 SDOH — ECONOMIC STABILITY: FOOD INSECURITY: WITHIN THE PAST 12 MONTHS, THE FOOD YOU BOUGHT JUST DIDN'T LAST AND YOU DIDN'T HAVE MONEY TO GET MORE.: NEVER TRUE

## 2025-06-11 SDOH — SOCIAL STABILITY: SOCIAL INSECURITY: DO YOU FEEL UNSAFE GOING BACK TO THE PLACE WHERE YOU ARE LIVING?: NO

## 2025-06-11 SDOH — SOCIAL STABILITY: SOCIAL INSECURITY: ARE YOU OR HAVE YOU BEEN THREATENED OR ABUSED PHYSICALLY, EMOTIONALLY, OR SEXUALLY BY ANYONE?: NO

## 2025-06-11 SDOH — ECONOMIC STABILITY: FOOD INSECURITY: HOW HARD IS IT FOR YOU TO PAY FOR THE VERY BASICS LIKE FOOD, HOUSING, MEDICAL CARE, AND HEATING?: NOT HARD AT ALL

## 2025-06-11 SDOH — SOCIAL STABILITY: SOCIAL INSECURITY
WITHIN THE LAST YEAR, HAVE YOU BEEN KICKED, HIT, SLAPPED, OR OTHERWISE PHYSICALLY HURT BY YOUR PARTNER OR EX-PARTNER?: NO

## 2025-06-11 SDOH — ECONOMIC STABILITY: INCOME INSECURITY: IN THE PAST 12 MONTHS HAS THE ELECTRIC, GAS, OIL, OR WATER COMPANY THREATENED TO SHUT OFF SERVICES IN YOUR HOME?: NO

## 2025-06-11 SDOH — ECONOMIC STABILITY: HOUSING INSECURITY: IN THE LAST 12 MONTHS, WAS THERE A TIME WHEN YOU WERE NOT ABLE TO PAY THE MORTGAGE OR RENT ON TIME?: NO

## 2025-06-11 SDOH — SOCIAL STABILITY: SOCIAL INSECURITY: HAVE YOU HAD ANY THOUGHTS OF HARMING ANYONE ELSE?: NO

## 2025-06-11 SDOH — SOCIAL STABILITY: SOCIAL INSECURITY: WITHIN THE LAST YEAR, HAVE YOU BEEN HUMILIATED OR EMOTIONALLY ABUSED IN OTHER WAYS BY YOUR PARTNER OR EX-PARTNER?: NO

## 2025-06-11 SDOH — ECONOMIC STABILITY: TRANSPORTATION INSECURITY: IN THE PAST 12 MONTHS, HAS LACK OF TRANSPORTATION KEPT YOU FROM MEDICAL APPOINTMENTS OR FROM GETTING MEDICATIONS?: NO

## 2025-06-11 SDOH — SOCIAL STABILITY: SOCIAL INSECURITY: ARE THERE ANY APPARENT SIGNS OF INJURIES/BEHAVIORS THAT COULD BE RELATED TO ABUSE/NEGLECT?: NO

## 2025-06-11 SDOH — HEALTH STABILITY: MENTAL HEALTH: CURRENT SMOKER: 1

## 2025-06-11 SDOH — SOCIAL STABILITY: SOCIAL INSECURITY: WITHIN THE LAST YEAR, HAVE YOU BEEN AFRAID OF YOUR PARTNER OR EX-PARTNER?: NO

## 2025-06-11 SDOH — SOCIAL STABILITY: SOCIAL INSECURITY: HAVE YOU HAD THOUGHTS OF HARMING ANYONE ELSE?: NO

## 2025-06-11 SDOH — ECONOMIC STABILITY: HOUSING INSECURITY: IN THE PAST 12 MONTHS, HOW MANY TIMES HAVE YOU MOVED WHERE YOU WERE LIVING?: 1

## 2025-06-11 SDOH — SOCIAL STABILITY: SOCIAL INSECURITY
WITHIN THE LAST YEAR, HAVE YOU BEEN RAPED OR FORCED TO HAVE ANY KIND OF SEXUAL ACTIVITY BY YOUR PARTNER OR EX-PARTNER?: NO

## 2025-06-11 SDOH — SOCIAL STABILITY: SOCIAL INSECURITY: ABUSE: ADULT

## 2025-06-11 SDOH — SOCIAL STABILITY: SOCIAL INSECURITY: DO YOU FEEL ANYONE HAS EXPLOITED OR TAKEN ADVANTAGE OF YOU FINANCIALLY OR OF YOUR PERSONAL PROPERTY?: NO

## 2025-06-11 SDOH — SOCIAL STABILITY: SOCIAL INSECURITY: DOES ANYONE TRY TO KEEP YOU FROM HAVING/CONTACTING OTHER FRIENDS OR DOING THINGS OUTSIDE YOUR HOME?: NO

## 2025-06-11 SDOH — SOCIAL STABILITY: SOCIAL INSECURITY: HAS ANYONE EVER THREATENED TO HURT YOUR FAMILY OR YOUR PETS?: NO

## 2025-06-11 SDOH — SOCIAL STABILITY: SOCIAL INSECURITY
ASK PARENT OR GUARDIAN: ARE THERE TIMES WHEN YOU, YOUR CHILD(REN), OR ANY MEMBER OF YOUR HOUSEHOLD FEEL UNSAFE, HARMED, OR THREATENED AROUND PERSONS WITH WHOM YOU KNOW OR LIVE?: NO

## 2025-06-11 SDOH — SOCIAL STABILITY: SOCIAL INSECURITY: WERE YOU ABLE TO COMPLETE ALL THE BEHAVIORAL HEALTH SCREENINGS?: YES

## 2025-06-11 ASSESSMENT — COGNITIVE AND FUNCTIONAL STATUS - GENERAL
WALKING IN HOSPITAL ROOM: A LITTLE
MOBILITY SCORE: 20
CLIMB 3 TO 5 STEPS WITH RAILING: A LITTLE
DAILY ACTIVITIY SCORE: 23
MOVING TO AND FROM BED TO CHAIR: A LITTLE
CLIMB 3 TO 5 STEPS WITH RAILING: A LITTLE
DAILY ACTIVITIY SCORE: 23
MOBILITY SCORE: 20
DRESSING REGULAR LOWER BODY CLOTHING: A LITTLE
PATIENT BASELINE BEDBOUND: NO
STANDING UP FROM CHAIR USING ARMS: A LITTLE
MOVING TO AND FROM BED TO CHAIR: A LITTLE
DRESSING REGULAR LOWER BODY CLOTHING: A LITTLE
WALKING IN HOSPITAL ROOM: A LITTLE
STANDING UP FROM CHAIR USING ARMS: A LITTLE

## 2025-06-11 ASSESSMENT — PAIN SCALES - GENERAL
PAINLEVEL_OUTOF10: 7
PAINLEVEL_OUTOF10: 3
PAINLEVEL_OUTOF10: 9
PAIN_LEVEL: 5
PAINLEVEL_OUTOF10: 6
PAINLEVEL_OUTOF10: 4
PAINLEVEL_OUTOF10: 5 - MODERATE PAIN
PAINLEVEL_OUTOF10: 5 - MODERATE PAIN
PAINLEVEL_OUTOF10: 7
PAINLEVEL_OUTOF10: 0 - NO PAIN
PAINLEVEL_OUTOF10: 7

## 2025-06-11 ASSESSMENT — ACTIVITIES OF DAILY LIVING (ADL)
FEEDING YOURSELF: INDEPENDENT
PATIENT'S MEMORY ADEQUATE TO SAFELY COMPLETE DAILY ACTIVITIES?: YES
HEARING - LEFT EAR: FUNCTIONAL
GROOMING: INDEPENDENT
WALKS IN HOME: NEEDS ASSISTANCE
TOILETING: INDEPENDENT
JUDGMENT_ADEQUATE_SAFELY_COMPLETE_DAILY_ACTIVITIES: YES
LACK_OF_TRANSPORTATION: NO
ADEQUATE_TO_COMPLETE_ADL: YES
LACK_OF_TRANSPORTATION: NO
DRESSING YOURSELF: INDEPENDENT
BATHING: INDEPENDENT
HEARING - RIGHT EAR: FUNCTIONAL

## 2025-06-11 ASSESSMENT — PAIN DESCRIPTION - LOCATION: LOCATION: KNEE

## 2025-06-11 ASSESSMENT — PAIN - FUNCTIONAL ASSESSMENT
PAIN_FUNCTIONAL_ASSESSMENT: 0-10
PAIN_FUNCTIONAL_ASSESSMENT: FLACC (FACE, LEGS, ACTIVITY, CRY, CONSOLABILITY)
PAIN_FUNCTIONAL_ASSESSMENT: 0-10

## 2025-06-11 ASSESSMENT — COLUMBIA-SUICIDE SEVERITY RATING SCALE - C-SSRS
6. HAVE YOU EVER DONE ANYTHING, STARTED TO DO ANYTHING, OR PREPARED TO DO ANYTHING TO END YOUR LIFE?: NO
1. IN THE PAST MONTH, HAVE YOU WISHED YOU WERE DEAD OR WISHED YOU COULD GO TO SLEEP AND NOT WAKE UP?: NO
2. HAVE YOU ACTUALLY HAD ANY THOUGHTS OF KILLING YOURSELF?: NO

## 2025-06-11 ASSESSMENT — LIFESTYLE VARIABLES
HOW OFTEN DO YOU HAVE A DRINK CONTAINING ALCOHOL: NEVER
AUDIT-C TOTAL SCORE: 0
HOW MANY STANDARD DRINKS CONTAINING ALCOHOL DO YOU HAVE ON A TYPICAL DAY: PATIENT DOES NOT DRINK
AUDIT-C TOTAL SCORE: 0
SKIP TO QUESTIONS 9-10: 1
HOW OFTEN DO YOU HAVE 6 OR MORE DRINKS ON ONE OCCASION: NEVER

## 2025-06-11 ASSESSMENT — PAIN DESCRIPTION - DESCRIPTORS
DESCRIPTORS: ACHING
DESCRIPTORS: SHARP

## 2025-06-11 ASSESSMENT — PATIENT HEALTH QUESTIONNAIRE - PHQ9
1. LITTLE INTEREST OR PLEASURE IN DOING THINGS: NOT AT ALL
SUM OF ALL RESPONSES TO PHQ9 QUESTIONS 1 & 2: 0
2. FEELING DOWN, DEPRESSED OR HOPELESS: NOT AT ALL

## 2025-06-11 ASSESSMENT — PAIN DESCRIPTION - ORIENTATION: ORIENTATION: RIGHT

## 2025-06-11 NOTE — ANESTHESIA PREPROCEDURE EVALUATION
Patient: Chloe Mclean    Procedure Information       Date/Time: 06/11/25 1225    Procedure: RIGHT KNEE REVISION ARTHROPLASTY BOTH COMPONENTS *SDS* MARVIN TRIATHLON REVISION KNEE, MARVIN CONES, SAMIR KNEE (Right: Knee) - SCHEDULE: 2 HRS, SAME DAY ADMIT, MARVIN TRIATHLON REVISION KNEE, MARVIN CONES, SAMIR KNEE, GENERAL    Location: [facility] / [facility]    Surgeons: Ramya Rendon, DO            Relevant Problems   Anesthesia   (+) PONV (postoperative nausea and vomiting)      Pulmonary   (+) Aspiration pneumonia (Multi)   (+) Asthma   (+) Chronic obstructive pulmonary disease, unspecified   (+) Exertional shortness of breath      Neuro   (+) Bipolar depression (Multi)   (+) Carpal tunnel syndrome of right wrist   (+) Chronic schizophrenia (Multi)   (+) Moderate episode of recurrent major depressive disorder      GI   (+) Rectal hemorrhage      /Renal   (+) Urinary tract infectious disease      Hematology   (+) Anemia      Musculoskeletal   (+) Carpal tunnel syndrome of right wrist   (+) Fibromyalgia   (+) Primary osteoarthritis of right knee      ID   (+) Abscess of female pelvis   (+) Aspiration pneumonia (Multi)   (+) Local infection of wound   (+) MSSA (methicillin susceptible Staphylococcus aureus) infection   (+) Methicillin susceptible Staphylococcus aureus infection   (+) Pelvic abscess in female   (+) Postoperative wound infection   (+) Trichomonas vaginitis   (+) Urinary tract infectious disease   (+) Wound infection after surgery      Skin   (+) Rash and other nonspecific skin eruption       Clinical information reviewed:   Tobacco  Allergies  Meds   Med Hx  Surg Hx  OB Status  Fam Hx  Soc   Hx        NPO Detail:  NPO/Void Status  Date of Last Liquid: 06/10/25  Time of Last Liquid: 2230  Date of Last Solid: 06/10/25  Time of Last Solid: 2230  Last Intake Type: Light meal         Physical Exam    Airway  Mallampati: II  TM distance: >3 FB  Neck ROM: full  Mouth opening: 3 or more  finger widths     Cardiovascular - normal exam   Dental     (+) upper dentures       Pulmonary - normal exam   Abdominal (+) obese             Anesthesia Plan    History of general anesthesia?: yes  History of complications of general anesthesia?: no    ASA 3     general and regional   (Right Adductor Canal and IPACK Nerve Blocks)  The patient is a current smoker.  Patient was not previously instructed to abstain from smoking on day of procedure.    intravenous induction   Postoperative pain plan includes opioids.  Anesthetic plan and risks discussed with patient.  Use of blood products discussed with patient who.    Plan discussed with CRNA.

## 2025-06-11 NOTE — ANESTHESIA PROCEDURE NOTES
Peripheral Block    Patient location during procedure: pre-op  Medication administered at: 6/11/2025 11:51 AM  End time: 6/11/2025 11:54 AM  Reason for block: procedure for pain, at surgeon's request and post-op pain management  Staffing  Performed: CRNA   Authorized by: ABIMAEL Aguilera    Performed by: ABIMAEL Rose  Preanesthetic Checklist  Completed: patient identified, IV checked, site marked, risks and benefits discussed, surgical consent, monitors and equipment checked, pre-op evaluation and timeout performed   Timeout performed at: 6/11/2025 11:40 AM  Peripheral Block  Patient position: laying flat  Prep: ChloraPrep  Patient monitoring: heart rate, cardiac monitor and continuous pulse ox  Block type: IPACK  Laterality: right  Injection technique: single-shot  Local infiltration: bupivicaine  Infiltration strength: 0.3 %  Dose: 20 mL  Needle  Needle gauge: 20 G  Needle localization: ultrasound guidance  Assessment  Injection assessment: negative aspiration for heme, no paresthesia on injection, incremental injection and local visualized surrounding nerve on ultrasound  Paresthesia pain: none  Heart rate change: no  Slow fractionated injection: yes  Additional Notes  Block requested by surgeon. Risks benefits discussed. Patient agreeable to ipack nerve block. Site marked. Time out complete. Monitors on. Sedation administered as stated in adductor canal block. Sterile prep and drape. Ultrasound guided. 20g 4inch stimuplex needle used. Attempt x1 needle tip visualized entire procedure. Anatomy id. 5ml 1% lidocaine with epinephrine 1:100,000 20 ml 0.25% bupivacaine with 4 mg decadron preservative free administered in divided doses. Aspiration every 3-5ml. Negative heme negative paresthesia. Pt tolerated procedure well.

## 2025-06-11 NOTE — ANESTHESIA POSTPROCEDURE EVALUATION
Patient: Chloe Mclean    Procedure Summary       Date: 06/11/25 Room / Location: [facility] 07 / [facility]    Anesthesia Start: 1157 Anesthesia Stop: 1517    Procedure: RIGHT KNEE REVISION ARTHROPLASTY BOTH COMPONENTS *SDS* MARVIN TRIATHLON REVISION KNEE, MARVIN CONES, SAMIR KNEE (Right: Knee) Diagnosis:       Loosening of knee joint prosthesis, initial encounter      (Loosening of knee joint prosthesis, initial encounter [T84.038A, Z96.659])    Surgeons: Ramya Rendon DO Responsible Provider: JAMSHID Aguilera-MADISON    Anesthesia Type: general, regional ASA Status: 3            Anesthesia Type: general, regional    Vitals Value Taken Time   /93 06/11/25 16:10   Temp 36.9 °C (98.5 °F) 06/11/25 15:15   Pulse 90 06/11/25 16:15   Resp 24 06/11/25 16:15   SpO2 100 % 06/11/25 16:15   Vitals shown include unfiled device data.    Anesthesia Post Evaluation    Patient location during evaluation: PACU  Patient participation: complete - patient participated  Level of consciousness: awake  Pain score: 5  Pain management: adequate  Airway patency: patent  Cardiovascular status: acceptable  Respiratory status: acceptable  Hydration status: acceptable  Postoperative Nausea and Vomiting: none        No notable events documented.

## 2025-06-11 NOTE — ANESTHESIA PROCEDURE NOTES
Airway  Date/Time: 6/11/2025 12:06 PM  Reason: elective    Airway not difficult    Staffing  Performed: CRNA   Authorized by: ABIMAEL Aguilera    Performed by: ABIMAEL Aguilera  Patient location during procedure: OR    Patient Condition  Indications for airway management: anesthesia  Patient position: sniffing  MILS maintained throughout  Planned trial extubation  Sedation level: deep     Final Airway Details   Preoxygenated: yes  Final airway type: endotracheal airway  Successful airway: ETT  Cuffed: yes   Successful intubation technique: video laryngoscopy  Adjuncts used in placement: intubating stylet and cricoid pressure  Endotracheal tube insertion site: oral  Blade: Seamus (Brewer)  Blade size: #3  ETT size (mm): 7.0  Cormack-Lehane Classification: grade I - full view of glottis  Placement verified by: chest auscultation and capnometry   Cuff volume (mL): 6  Measured from: lips  Number of attempts at approach: 1    Additional Comments  Modified Rapid Sequence

## 2025-06-11 NOTE — OP NOTE
Date: 2025   OR Location: [facility] OR    Name: Chloe Mclean  : 1980    MRN: [id number]    Diagnosis  Pre-op Diagnosis      * Loosening of knee joint prosthesis, initial encounter [T84.038A, Z96.659]  Post-op Diagnosis     * Loosening of knee joint prosthesis, initial encounter [T84.038A, Z96.659]      Procedures  Both component total knee arthroplasty revision CPT code 58501    Case Complexity:  High.  A 22 modifier is added to the case for removal of total knee components.  A 22 modifier is also added to the case secondary to difficulty of revision knee arthroplasty in the setting of severe bone loss of both the distal femur and proximal tibia and attenuation of the surrounding soft tissue structures.  This resulted in a more difficult case and resulted in increased operative time and effort compared to a standard knee arthroplasty procedure, and required specific expertise in complex joint reconstruction techniques to safely complete the operation.    Surgeons      * Ramya Rendon - Primary     Specimen: none    Staff: Circulator: Israel Hickman RN; Nitin Kerr RN  Scrub Person: Jayna Rangel     Drains and/or Catheters: none    Tourniquet Times:   Total Tourniquet Time Documented:  Thigh (Right) - 112 minutes  Total: Thigh (Right) - 112 minutes      Estimated Blood Loss: 250 cc    Resident/Fellow/Other Assistant:  Surgeons and Role:     * Karlene Thompson PA-C - MARCEL First Assist    was present for the entire case. Their assistance was necessary and critical to the successful completion of the procedure.They provided skilled assistance with leg positioning, retraction, leg manipulation, implant insertion and wound closure.  A qualified assistant was not available to perform their portion of the case.    Procedure Summary  Anesthesia: General    Anesthesia Staff: CRNA: JAMSHID Aguilera-CRNA   ASA: III       Intra-op Medications:   - 1 Gram Tranexamic acid prior to surgical incision  - 1 Gram  Tranexamic acid at start of incision close  - MILY Pain cockail: ropivacaine-epinephrine-clonidine-ketorolac 2.46-0.005- 0.0008-0.3mg/mL periarticular syringe: 50 cc    IMPLANTS:   Implant Name Type Inv. Item Serial No.  Lot No. LRB No. Used Action   CEMENT, BONE, TOBRAMYCIN, FULL DOSE - [id number] Implant CEMENT, BONE, TOBRAMYCIN, FULL DOSE  MARVIN [employer] [id number] Right 1 Implanted   CEMENT, BONE, TOBRAMYCIN, FULL DOSE - [id number] Implant CEMENT, BONE, TOBRAMYCIN, FULL DOSE  MARVIN [employer] MARILIN [id number] Right 1 Implanted   CEMENT, BONE, TOBRAMYCIN, FULL DOSE - [id number] Implant CEMENT, BONE, TOBRAMYCIN, FULL DOSE  MARVIN [employer] [id number] Right 1 Implanted   CEMENT, BONE, TOBRAMYCIN, FULL DOSE - [id number] Implant CEMENT, BONE, TOBRAMYCIN, FULL DOSE  MARVIN [employer] [id number] Right 1 Implanted   AUGMENT, FEMUR DIST 5MM SZ 4 RT TRIATH - [id number] Joint AUGMENT, FEMUR DIST 5MM SZ 4 RT TRIATH  MARVIN [employer] [id number] Right 1 Implanted   AUGMENT, FEMUR DIST 5MM SZ 4 RT TRIATH - [id number] Joint AUGMENT, FEMUR DIST 5MM SZ 4 RT TRIATH  MARVIN [employer] MARILIN [id number] Right 1 Implanted   STEM, TRIATHLON CEMENTED, 15MM X 50MM - [id number] Screw STEM, TRIATHLON CEMENTED, 15MM X 50MM  MARVIN [employer] [id number] Right 1 Implanted   STEM, TRIATHLON CEMENTED, 15MM X 50MM - [id number] Screw STEM, TRIATHLON CEMENTED, 15MM X 50MM  MARVIN [employer] [id number] Right 1 Implanted   COMPONENT, FEMORAL TS SZ 4 RT TRIATH - [id number] Joint COMPONENT, FEMORAL TS SZ 4 RT TRIATH  MARVIN [employer] [id number] Right 1 Implanted   TRIATHLON REVISION TIBIAL BASEPLATE    MAR[employer]IN [employer] [id number] Right 1 Implanted   ARTISAN BONE PLUG CANAL SIZES 13-17 MM- CEMENTED    MARVIN [employer] [id number] Right 2 Implanted   AUGMENT, CONE, TRIATHLON TRITANIUM SYMMETRIC, SZ B - [id number] Joint AUGMENT, CONE, TRIATHLON TRITANIUM SYMMETRIC, SZ B  [employer] MARILIN [id number] Right 1 Implanted   TRIATHLON TRITANIUM CENTRAL FEMORAL CONE ADJUSTMENT     [employer] [id number] Right 1 Implanted   TRIATHLON REVISION INSERT X3    [employer] [id number] Right 1 Implanted       Findings:   - Femoral component: Stable  - After removal of femoral component, femoral bone stock demonstrated: Osteolysis noted at the medial and lateral femoral condyles but intact condylar bone with no fractures appreciated  - Tibial component demonstrated: Subsidence with obvious loosening of the baseplate  - After removal of tibial component, tibial bone stock demonstrated: Intact cortical margins with no fractures appreciated  - Patellar component demonstrated: Well-fixed, appropriately sized and in appropriate position  - Joint polyethylene demonstrated: Wear and fracturing the polyethylene noted in the posterior lateral corner  - Surrounding soft tissues of the knee demonstrated severe hypertrophic scar tissue  - Ligamentous structures demonstrated: Intact extensor mechanism, intact medial lateral collaterals      Operative Indications:  This is a patient that presented with painful right total knee arthroplasty.  Patient had right knee replacement done 12 to 13 years ago by an outside surgeon.  She did well postoperatively and no postoperative infections or wound complications noted.  Workup in the office did demonstrate some lucencies in the femoral tibial component of the knee and given this infectious workup was done which did not demonstrate any sort of infection of the right knee.  She initially had a bone scan performed which did demonstrate uptake in both the femur and tibia and this was consistent with aseptic loosening noted on her x-rays and examination.  Given these findings we discussed revision arthroplasty.    Revision of the total  knee arthroplasty was recommended at this time. We had a long discussion about the problems associated with the knee. Knee joint balance and spacing was discussed. The revision procedure process itself was then outlined. The risks,  benefits and potential complications of the revision arthroplasty surgery were discussed with the patient in detail. Specific details of the procedure, hospitalization, recovery, rehabilitation, and long-term precautions were also provided. A long period of post-operative restricted weight bearing following the procedure was also outlined for appropriate bone and muscle healing and to redevelop appropriate stability if necessary for the patient. Pre-operative teaching was provided. Implant/prosthesis selection was outlined, and the many options available were explained; the final choice will be made at the time of the procedure to match the anatomy and condition of the bone, ligaments, tendons, and muscles. Understanding of all topics was conveyed to me by the patient, and consent was given to proceed with a revision total knee arthroplasty.     The patient was seen by PAT for pre-operative optimization, and risk assessment. Elizabeth-operative blood management and the potential for blood transfusion were discussed with risks and options clearly outlined. The patient has consented to the use of banked allogeneic blood if medically necessary. Infectious workup was negative for PJI at this point.    Procedure In Detail:  The patient was identified in the preoperative holding area.  Once again the surgery to be done was confirmed with the patient along with the consent.  Operative extremities marked.  The patient was identified and brought to the operating room by Anesthesia and Nursing teams.  Sign in was done with myself present. General Anesthesia was successfully performed.  The patient was then positioned supine on the operating room table and all bony prominences were  padded.  Intravenous antibiotic prophylaxis dosing was confirmed.  A padded tourniquet was applied to the operative upper thigh.  Full exam of the knee was done under anesthesia.  The leg was prepped and draped in the usual  sterile fashion. Tranexamic  was given prior to incision and again at the time of closure for blood conservation.     Surgical time-out was performed immediately preceding the incision with all personnel in  the  operating  room;  the  patient identity was again confirmed,  the  operative  knee,  surgical  site, and extremity were identified and confirmed.  X-rays were reviewed and availability of appropriate surgical equipment was established. The  knee was exsanguinated using Esmarch and the tourniquet was inflated.  The knee was exposed as necessary with anterior midline incision, incorporating the patient's previous incision. Incision was taken down to the capsule.   Large medial and lateral skin flaps were obtained. Careful dissection was performed creating full skin flaps in an effort not to disrupt blood supply to the skin. A medial parapatellar arthrotomy was  performed,  significant  scarring of synovial  tissue  and hypertrophy was encountered.  Joint fluid encountered was normal in appearance.     Starting  in  the  inferior medial part of the joint, we established a medial capsular release  of  the  proximal  medial  tibial plateau back to the semimembranosus insertion.   A  radical  synovectomy was performed in a counter-clockwise fashion starting in the inferior medial area. After performing a counter-clockwise radical synovectomy, we improved our exposure by carefully releasing the tissue superior to the tibial tubercle and all way over to Gerdy's tubercle and also the lateral gutter and  medial  gutters  around the distal femur.       Attention was then turned to the patella.  We removed heterotopic ossification, osteophytes, and redundant soft tissue around  the patellar implant.  Patellar implant was well  fixed.   No  asymmetric  ear.  Thus, the patella implant was left.        The knee was then flexed up and polyethylene was examined with findings above. Utilizing an osteotome the polyethylene was removed. Attention was then  turned to the femoral component.  Femoral component was examined with findings above. Appropriate femoral retractors were placed to protect all ligaments, tendons and neurovascular structures. Using a combination of a microsagittal saw and osteotomes both  medial  and  laterally  to separate the cement mantle from the femoral implant, the femoral implant was removed with the use of bone tamp and mallet. All remaining cement and fibrous tissue was removed from under and around the femoral prosthesis at this time. Femoral bone was examined with findings above.      At this time, we turned our attention to the tibial component.  Appropriate tibial retractors were placed and we performed a complete synovectomy of all tissue around the tibial implant to improve exposure and assess the bone interface.  Posterior capsular tissue was then released off of both the posterior tibia and posterior femoral condyles for purposes of exposure.  The tibial implant was examined with noted findings above.   Microsagittal saw and osteotomes were used to separate the cement and tibial implant.  With the use of bone tamp and mallet we were able to disimpact the tibial component.  All remaining cement on the plateau and the canal was then removed with a combination of rongeurs, osteotomes, and the cement removal set. Tibial bone was examined with findings above.      5 L of sterile saline were taken throughout the entire operative site at this time.  Patient also had dilute Betadine  placed into the wound and allowed to sit for 3 minutes and was thoroughly irrigated.     We then turned our attention to preparing the tibial plateau and canal.  Starting with a #9 reamer we sequentially reamed until we felt that we had good contact with surrounding bone.  All reamers were buried to the 175 surya by hand.  Reamer was checked in both the varus and valgus position and also  in  the  anterior-posterior position.  The internal medullary plateau  cutting jig was placed over and an appropriate clean-up cut of the tibial plateau at 0  degrees  angulation  was  made.  Bone loss on the tibia demonstrated central metaphyseal bone loss.  [email or web address] tibial cone system used to sequentially ream the metaphysis to an appropriate size,  With reaming for the tibial cone we paid special attention to the surrounding bone, varus valgus angulation, and also anterior posterior translation to obtain a center center location on the tibia over the tibial canal.  Appropriate sized cone was then impacted into placed in the appropriate rotation in regards to the tibial tubercle.  Rotation was marked.  Trial cone was then removed.  Trial baseplate guide was then placed in the set rotation and heel preparation was done followed by the boss reamer by hand.   Then the trial tibial construct was formed and impacted into place.  Drop thuy was used to confirm appropriate varus valgus positioning and also slope positioning.     After this was done we returned our attention to the femur.  In approximately 90° of flexion,  the flexion gap was assessed for discrepancy between the medial and lateral sides.  At this time, we appreciated appropriate rotation compared to the trans-epicondylar axis and also Whitesides line.  We also assessed the medial to lateral width and also the size of the explanted component to determine our femoral size.  Sequential reaming of the femoral canal was performed to allow for the appropriate size femoral stem.  Once we had good cortical contact distal femur cutting guide was placed and a cleanup cut was made in cut was noted for placement of augments along the distal femoral construct.  Following this our 4-in-1 cutting guide was then placed and rotation was in line with the epicondylar axis and noted to be parallel to the tibial baseplate.  This was then marked and pinned in place.  We then made cleanup cuts on the remainder of the femur.  Following this the box  cutting guide was then placed and our box cut was made as well.  Augments were added to the  distal femoral construct to restore the joint line to 28 mm from the medial epicondylar sulcus and 24 mm from the lateral epicondyle  and also to restore 6° of valgus angulation.  Secondary to the central metaphyseal bone loss a femoral cone was necessary for reconstruction of the distal femoral bone and metaphyseal fixation.  Reaming was performed for [place] femoral cone to the appropriate size and depth.  Trial cone was impacted into place with special attention to the depth and rotation. Femoral trial component was placed with trial polyethylene.  Knee taken through a range of motion which demonstrated 0-125 ROM and stability consisting of stability throughout the range of motion.  There are some laxity noted in both extension and flexion and given this we upsized and trialed a polyethylene and had good stability noted with 11 mm.   Varus and valgus stability was found throughout the range of motion including full extension, mid flexion, and flexion.  Appropriate anterior posterior stability demonstrated.     At  this  time,  all  trial  implants were removed.   The  whole  tibial plateau was dried and cleaned at this time. Cement restrictor was placed in the tibial canal to appropriate depth. Two bags of antibiotic cement  were  mixed.   Prior  to  mixing,  the  tibial  cone  was  placed and impacted.  Appropriate  rotation which had been marked was noted.  Tibial construct with stem was assembled on the back table.   Less viscous cement was placed on the undersurface of the tibial prosthesis prior to implantation.  Also less viscous cement was placed in the tibial canal for interdigitation. The final  tibial  component  was impacted  into  place.   Component was held in position, with proper rotation.  until  the  cement  hardened  and dried.  All excess cement was removed prior to hardening.       Our attention  was   then  turned  to the femoral component.  The femoral component was assembled on  the  back table. Femoral canal cement restrictor placed in the femoral canal to the appropriate distance of prosthesis.  Femoral cone that had been prepped for on the femur was now inserted into the femoral canal.  The distal femur and canal were irrigated and dried.  Antibiotic cement , 2 bags , placed into the cement gun.  Less viscous cement was placed on the femoral component.  Slightly less viscous cement was then pressurized into the femoral canal for proper interdigitation.  Cement was then placed on the distal femur to encompass all contact with the femoral component.  The femoral implant was then impacted into place. All excess cement was removed.  Knee  was brought into extension and then flexion, and again  all  excess cement was removed.   Patellar implant as stated before, was left from previous surgery.      A trial polyethylene was then placed mimicking previous trial.  Knee was held in extension until all cement was dry and hardened.  Any remaining hardened cement that was extra was then excised.  Knee was taken through a range of motion which found 0° of extension and approximately 125° of flexion.  Varus valgus stability was found throughout the range of motion.  Trial polyethylene removed and all excess cement and possible impinging tissue was removed from the posterior capsule in relation to the tibial plateau. Retractors were placed and final polyethylene was impacted into place measuring 11.  Knee was again taken through range of motion which demonstrated 0° of extension and approximately 125° of flexion with excellent stability found with a varus valgus stress throughout the range of motion.  Appropriate anterior posterior stability found throughout range of motion.  Patella also demonstrated midline tracking throughout the range of motion.     The tourniquet  was  let  down  and any bleeding was coagulated with  the use of Bovie.  Dilute betadine  bath was allowed to set for 3 minutes and then irrigated out withth 3 L sterile saline.  Pain cocktail placed throughout the capsule and intra-articularly.  Medial parapatellar arthrotomy was closed to a watertight position. Knee was taken through a range of motion to check for any clunking or drainage which none was found.  Subcutaneous tissues on  the  knee  were closed to a watertight closure.  Skin edges were approximated.  The patient had an incisional VAC placed.  The patient was awoken by Anesthesia, placed back  upon  the  transport  bed,  and  the  patient left the operative  room  stable  and  alert  condition.       Complications:  None; patient tolerated the procedure well.    Disposition: PACU - hemodynamically stable.  Condition: stable      Plan:                         Weight Bearing Status:  WBAT Bilateral LE                        Range of Motion: As tolerated                        Mike: none placed                        Dressing: Maintain for one week                        DVT Prophylaxis:  xarelto 10mg daily for four weeks                        Antibiotics: Continue x24 hours mar-operatively and dc home with six weeks doxycycline                        Discharge/Follow-up: Follow up in clinic in two weeks      Ramya Rendon DO  Attending Surgeon  Joint Replacement and Adult Reconstructive Surgery  [place], [place]      Attending Attestation: I was present and scrubbed for the entire procedure.    This note was dictated using speech recognition software and was not corrected for spelling or grammatical errors

## 2025-06-11 NOTE — DISCHARGE INSTRUCTIONS
Ramya Rendon,   Phone: [phone] - Maria C, Medical Assistant    PLEASE READ CAREFULLY BEFORE CONTACTING YOUR PROVIDER.    WE WORK COLLABORATIVELY AS A TEAM. CALLING MULTIPLE STAFF MEMBERS REGARDING THE SAME ISSUE WILL DELAY YOUR CARE.  MYCHART IS THE PREFERRED COMMUNICATION FOR ALL TEAM MEMBERS.  ________________________________________________________________________________________________________________________________________________________________________    After Surgery Instructions: TOTAL KNEE ARTHROPLASTY    The following is a general guide and may be applied to most patients that go home from the hospital after surgery. If you go to a rehab facility the timeline may deviate a little. Please do not hesitate to call our office for any questions or additional guidance. We will work with you to get the best experience from your new joint replacement.     WEEK 1  Relax…Don't Overdo it!  - WEIGHT BEARING: As tolerated, unless otherwise specified  - Get up once an hour for small activities. (get a drink, go to the bathroom, etc.)  - Keep the surgical site iced and elevated above your heart, if possible, while you are resting.  - You will have some light exercises given to you from the physical therapist in the hospital. Work diligently on your range of motion.  - BANDAGE CARE: REMOVE YOUR BANDAGE AT HOME 7 DAYS AFTER SURGERY    DO NOT place a pillow under the knee for comfort  DO NOT sleep or rest in a recliner if you are able to get in your bed  DO NOT wait until you start therapy to bend the knee.  The sooner the better! (work within your pain tolerance).    All of this may sound scary but knees can get stiff easily and we want you to get your range of motion back as quickly as possible!    SWELLING AND BRUISING  BRUISING AND SWELLING AFTER SURGERY IS NORMAL. As the patient becomes more mobile the bruising and swelling will begin to migrate towards the ankle and foot and is usually noticed toward the  end of the day.    WEEK 2-3  You will have a prescription for physical therapy given to you or electronically prescribed and you should start outpatient therapy 7-10 days after your operation. Be sure to do the home exercises on the days you are not attending physical therapy.    - Continue to progress walking as tolerated.   - Continue to work on range of motion exercises at home with a goal of 0-120 degrees by 12 weeks post operative  - Continue to use ice for soreness on the surgical site  - You may wean from your walker to a cane when you feel safe and comfortable to do so. Your physical therapist will also be helpful with progressing your assistive device.   - Be careful with bending and twisting - If it hurts, stop!!    FOLLOW UP  - Your first post-operative visit with Dr. Ramya Rendon should have been scheduled already. Please call [phone] for appointment questions  - You do not need new xrays for this visit  - Don't be nervous! This visit is to see how you are doing and make sure your incision is healing nicely and have begun working with physical therapy.       MEDICATION REFILLS - Please call main office number: [phone]    You will not receive a call indicating that your prescription has been filled.  Please contact your pharmacy with any questions.    Medication refills will be filled Monday-Friday 7am to 1pm ONLY. Please call the office or send a [employer] message for a refill request.  Any requests received outside of this timeframe will be handled on the next business day.  The office staff and orthopedic nurses cannot refill medications; messages should be left directly through the office or via my chart.  Please do not call multiple times or call other members of the care team for medication needs, this will cause the refill to take longer.    Per State and Institutional policy, pain medications can only be refilled every 7 days for up to six weeks following surgery.    DRIVING & TRAVEL  AFTER SURGERY   Patients should anticipate waiting at least 3-6 weeks before traveling long distances after surgery.  At minimum you cannot be using your walker before considering driving and you cannot take any narcotic medications prior to driving. You will need to stop to walk around ever 1 hour during your travel to help with blood clot prevention.  Please call the office or your joint nurse to discuss prior to post-surgical travel.  Patients may not drive until cleared by the joint nurse or the office.    DENTAL PROCEDURES & CLEANINGS  You must wait a minimum of 3 months for elective dental appointments (if possible, we understand emergencies happen), including routine cleanings or dental work including bridges, crowns, extractions, etc..  For any dental visit - cleaning or dental procedures - patients must take an antibiotic 1 hour before the appointment.  Antibiotics are a lifelong need before dental appointments.  The antibiotic prescribed will be based on each patient's allergies.    WOUND CARE  You will have an ace wrap on your leg put on during surgery. This compression wrap is for comfort and may be removed 24 hours after surgery. You may continue to use compression throughout your recovery if this is comfortable for you.  You have a waterproof bandage on your wound and may shower with this on. The waterproof bandage is to remain in place for 7 days. You may remove it yourself. You may leave your incision open to air after the bandage has been removed.  Under your waterproof bandage you have a mesh and glue dressin in place. Do not peel this off. This will be on for 3-4 weeks. You may trim the edges as they peel off on their own. You can continue to shower with this on. Let the water run freely over your incision when showering and do not scrub.   You may shower upon discharging from the hospital.  Soap and water is permitted to run over the surgical dressing.  Do not scrub directly over these items.  DO  NOT soak your incision in a bath, hot tub, pool or pond/lake for a minimum of 3 weeks following your surgery.  DO NOT use lotions, creams, ointments on your wound for a minimum of 3 weeks following your surgery. At that time you may use vitamin E to assist with softening of your incision.    NUMBNESS & CLICKING  Audible clicking with movement or exercises is considered normal following joint replacement.  You may also feel decreased sensation or numbness near the incision site.  These are usually normal and may or may not fade over time.     RESTARTING HOME ROUTINE - DIET & MEDICATIONS  Post-operative constipation can result due to a combination of inactivity, anesthesia and pain medication. To help prevent this, you should increase your water and fiber intake. Physical activity such as walking will also help stimulate the bowels.   You may resume your normal diet when you discharge home.  Choose foods that help promote good bowel habits and prevent constipation, such as foods high in fiber.  You may restart your home medications the following day after your surgery UNLESS you have been given alternate instructions.  Follow the instructions given to you on your hospital discharge instructions for more information regarding your home medications.    IN-HOME PHYSICAL THERAPY & OUTPATIENT PHYSICAL THERAPY  Continue the exercises you were given in the hospital until you have been seen by in-home therapy.  Make sure to provide a phone number with the ability for the home care staff to leave a message if you do not answer your phone.    Depending on your treatment plan you will begin outpatient or home therapy after surgery. This should be arranged through the office of hospital during discharge  FOR PATIENT DOING HOME THERAPY: Outpatient physical therapy following knee replacement surgery should begin 2-3 weeks after surgery.  You should call to schedule this appointment ASAP if not already scheduled before surgery.   Waiting until you are ready for outpatient physical therapy will cause a delay in your care.  You may choose any outpatient physical therapy location.  Call the office for an order if needed.    EMERGENCIES - WHEN TO CONTACT THE SURGEON'S OFFICE IMMEDIATELY  Fever >101 with chills that has been present for at least 48 hours.   Excessive bleeding from incision that will not slow down. A small amount of drainage is normal and expected.  Once pressure is applied and the area is covered, do not continue to check the area regularly.  This will remove pressure and bleeding will continue.  Leave in place for 4-6 hours.  Signs of infection of incision-excessive drainage that is soaking through your dressing (especially if it is pus-like), redness that is spreading out from the edges of your incision, or increased warmth around the area.  Excruciating pain for which the pain medication, taken as instructed, is not helping.  Severe calf pain.  Go directly to the emergency room or call 911, if you are experiencing chest pain or difficulty breathing.      ICE & COLD THERAPY INSTRUCTIONS    To assist with pain control and post-op swelling, you should be using ice regularly throughout recovery, especially for the first 6 weeks, regardless of the cold therapy method you use.      Always make sure there is a layer of protection between the cold pad and your skin.    If you are using ICE PACKS or GEL PACKS, you will need to alternate 20 minutes on, 20 minutes off twice per hour.    If you are using an ICE MACHINE, please follow the provided ice machine instructions.  These devices differ from ice or ice packs whereas the mechanism circulates water through tubing and a pad to provide longer periods of cold therapy to the desired site.  You can use your cold devices around the clock for optimal comfort.  We recommend using cold therapy after working with therapy or completing exercises on your own.  There is no set schedule in which  you must follow while using cold therapy.  Below are a few points to remember when using a cold therapy device:    You do not need to need to use the 20 on, 20 off method.  Detach the pad from the cooler and ambulate at least once every hour.  You can check your skin under the pad at this time.  You may wear the cold therapy device during periods of sleep including overnight.  If you wake up during the night, you can check the skin at this time.  You do not need to wake up specifically to perform skin checks.  Empty the cooler and pad when device is not in use.  Follow 's instructions for cleaning your cold therapy device.      DISCHARGE MEDICATIONS - Please reference the sample schedule on the reverse side for instructions on how to best schedule medications.    PAIN MEDICATION    _______ Oxycodone   Oxycodone has been prescribed for post-operative pain control. Take one 5 mg tablet every 6 hours for pain. Alternate with Tramadol. See medication example sheet. This medication will only be refilled ONCE every 7 days for a period of 6 weeks. After 6 weeks, you will transition to acetaminophen (Tylenol) and over -the- counter anti-inflammatories such as Ibuprofen, Advil or Aleve in conjunction with ICE.    Side effects may be constipation and nausea, vomiting, sleepiness, dizziness, lightheadedness, headache, blurred vision, dry mouth sweating, itching (if you have itching, over-the -counter Benadryl can be used as needed).   You may NOT operate a motor vehicle while taking this medication or have been cleared by your surgeon or PA.     ________ Tramadol   Tramadol has been prescribed for post-operative pain control. Take one 50 mg tablet every 6 hours for pain. Alternate with Oxycodone. See medication example sheet. This medication will only be refilled ONCE every 7 days for a period of 6 weeks. After 6 weeks, you will transition to acetaminophen (Tylenol) and over- the- counter anti-inflammatories such  as Ibuprofen, Advil or Aleve in conjunction with ICE.    Side effects may be constipation and nausea, vomiting, sleepiness, dizziness, lightheadedness, headache, blurred vision, dry mouth, sweating, itching (if you have itching, over-the -counter Benadryl can be used as needed).   You may NOT operate a motor vehicle while taking this medication or have been cleared by your surgeon or PA.    NON-NARCOTIC PAIN MEDICATION     _________ Celecoxib (Celebrex) or Meloxicam (Mobic) - Prescription anti-inflammatory medication   One of these will be prescribed (if you are able to take it) as an adjunct anti-inflammatory to assist in pain control. Take one twice daily for 4 weeks. See medication example sheet. You will not receive refills on this medication.   Side effects may include nausea or upset stomach    _________ Acetaminophen (Tylenol)   Acetaminophen has been prescribed as an adjunct for pain control. Take two 500 mg tablet every 6-8 hours for 4 weeks. See medication example sheet. No refills will be given after initial prescription.   Side effects may include nausea, heartburn, drowsiness, and headache.    _________Cyclobenzaprine (Flexeril)   This is a muscle relaxer that will be prescribed    Take this AS NEEDED per instructions on bottle   This will be only prescribed once and is available to help with muscle spasms. There will be no refills of this medication    BLOOD THINNER    __________ Aspirin or Other Blood Thinner   Aspirin or another medication has been prescribed as a blood thinner to prevent blood clots in your leg or lungs. Take as prescribed on the bottle for 4 weeks. You will not receive a refill on this medication.   Do not take this medication if you are on another blood thinner.    ANTI NAUSEA    __________ Pantoprazole   Pantoprazole has been prescribed to help with nausea and protect your stomach while taking pain medication. Take one 40 mg tablet once daily for 4 weeks. You will not receive a  refill on this medication.    ANTIBIOTIC     If you are deemed “high risk” for infection after surgery and antibiotic will be prescribed. Please take this as directed for one week.     STOOL SOFTENERS    __________ Senna   Post-operative constipation can result due to a combination of inactivity, anesthesia and pain medication. To help prevent this, you should increase your water and fiber intake. Physical activity such as walking will also help stimulate the bowels.    Senna has been prescribed to help with constipation while on Oxycodone and Tramadol. Take one tablet twice daily for 4 weeks. No refills will be given.   You may also take Miralax in combination with Senna to prevent constipation.  This can be purchased over the counter at your local pharmacy.  Take as instructed on the bottle.   Pain Medication Refills -[phone] or MyChart- Monday through Friday 7am-1pm    Medication refills will be sent upon receipt of your request during the times listed above. Due to the high call volume, you will not receive a call confirming prescription refills; please do not call multiple times.  Prescription refills may take a few hours to process, you may follow up with your pharmacy for pickup availability.    SAMPLE              The times below are an example of how to organize medications to optimize pain control  Your actual medication schedule may vary based on your last dose taken IN THE HOSPITAL      Time 3:00am 6:00am 9:00am 12:00pm 3:00pm 6:00pm 9:00pm 12:00am   Medications Tramadol Acetaminophen (Tylenol)   Oxycodone   Blood Thinner  Senna  Pantoprazole  Tramadol  Celebrex Acetaminophen (Tylenol)   Oxycodone Tramadol Acetaminophen (Tylenol)   Oxycodone Blood Thinner  Senna  Tramadol  Celebrex   Acetaminophen (Tylenol)   Oxycodone            You may begin to wean off the pain medication as your pain remains controlled with increased activity.  The schedules provided are meant to serve as an example.  You may  wean off based on your pain control.  Please note that pain medications are not filled beyond 6 weeks after surgery.              The times below are an example of how to WEAN OFF medications WHILE CONTINUING TO OPTIMIZE PAIN CONTROL.  Your actual medication schedule may vary based on your last dose taken.  Time 12:00am 4:00am 8:00am 12:00pm 4:00pm 8:00pm   Med Tramadol Oxycodone   Tramadol Oxycodone Tramadol Oxycodone     Time 12:00am 6:00am 12:00pm 6:00pm   Med Tramadol Oxycodone   Tramadol Oxycodone     Time 12:00am 8:00am 4:00pm   Med Tramadol Oxycodone   Tramadol     Time 12:00am 12:00pm   Med Tramadol Tramadol       _________________________________________________________________________________________________________________________________________________________________________    OFFICE INFORMATION    OFFICE LOCATIONS    Location 1: [facility]  [address], [address]  [place] [address]  Office Number: [phone]    Location 2: [facility]  [address]  [facility] [address]  Office Number: [phone]    Location 3: [id number] [place] [address]  Office Number: [phone]

## 2025-06-11 NOTE — CARE PLAN
Problem: Pain  Goal: Takes deep breaths with improved pain control throughout the shift  Outcome: Progressing  Goal: Turns in bed with improved pain control throughout the shift  Outcome: Progressing  Goal: Walks with improved pain control throughout the shift  Outcome: Progressing  Goal: Performs ADL's with improved pain control throughout shift  Outcome: Progressing  Goal: Participates in PT with improved pain control throughout the shift  Outcome: Progressing  Goal: Free from opioid side effects throughout the shift  Outcome: Progressing  Goal: Free from acute confusion related to pain meds throughout the shift  Outcome: Progressing     Problem: Pain - Adult  Goal: Verbalizes/displays adequate comfort level or baseline comfort level  Outcome: Progressing     Problem: Safety - Adult  Goal: Free from fall injury  Outcome: Progressing     Problem: Discharge Planning  Goal: Discharge to home or other facility with appropriate resources  Outcome: Progressing     Problem: Chronic Conditions and Co-morbidities  Goal: Patient's chronic conditions and co-morbidity symptoms are monitored and maintained or improved  Outcome: Progressing     Problem: Nutrition  Goal: Nutrient intake appropriate for maintaining nutritional needs  Outcome: Progressing     Problem: Fall/Injury  Goal: Not fall by end of shift  Outcome: Progressing  Goal: Be free from injury by end of the shift  Outcome: Progressing  Goal: Verbalize understanding of personal risk factors for fall in the hospital  Outcome: Progressing  Goal: Verbalize understanding of risk factor reduction measures to prevent injury from fall in the home  Outcome: Progressing  Goal: Use assistive devices by end of the shift  Outcome: Progressing  Goal: Pace activities to prevent fatigue by end of the shift  Outcome: Progressing    The patient's goals for the shift include      The clinical goals for the shift include Patient will remain free from falls and injury during  shift

## 2025-06-11 NOTE — CONSULTS
Inpatient consult to Medicine  Consult performed by: Saumya Holly PA-C  Consult ordered by: Ramya Rendon DO        [facility] GENERAL MEDICINE CONSULTATION NOTE    ASSESSMENT AND PLAN:     Chloe Mclean is a 45 y.o. female with past medical history s/f COPD / asthma overlap (no baseline O2), DASHA (no PAP therapy), allergic rhinitis, seasonal allergies, nicotine dependence (vaping), anxiety, depression, schizophrenia, obesity, who was admitted to the orthopedic service s/p R TKA revision (both components) 2/2 loosening of the prosthesis. Medicine was consulted for medical management.    R TKA loosening of prosthesis s/p R TKA revision (both components) with Dr. Rendon on 06/11/25  -POD #0  -Post-op Dressing: C/D/I  -NVS: Intact  -Mike: n/a  -Pain Management / Pain Control: Under moderate control presently, still uncomfortable   -Diet / Appetite: Tolerating sips of her drink, still a bit nauseous and does not want to try advancing her diet. She admits to a history of PONV in the past.   -DVT Prophylaxis: as per primary service (Lovenox --> Xarelto 10mg daily x 1 month)  -Working with PT/OT     H/o provoked VTE (post-operative), remote  -Patient is currently on Lovenox and will be discharged on Xarelto out of abundance of caution   -Continue as directed by orthopedics    COPD / asthma without acute exacerbation   -Patient without evidence of decompensation at this time   -Continued on home therapies (scheduled Breo, prn albuterol MDI and prn nebs). Encourage pulmonary hygiene while admitted.    -Continue outpatient follow up with [facility] (Dr. King) as previously done. I reviewed the last note from her visits.     DASHA  -Patient does not currently use PAP therapy   -Continue follow up as scheduled     Allergic rhinitis, seasonal allergies   -Continue formulary alternative (Zyrtec) while admitted  -Continue home flonase     Nicotine Dependence (vapes nicotine containing substances)  -Cessation  "education provided during hospitalization   -NRT discussed and offered --> patient declines need for this presently. She will make myself or her nurse aware if she changes her mind.      Anxiety, depression  Schizophrenia   -Patient is continued on home Seroquel nightly. She admits that she only uses 200mg nightly instead of prescribed 400mg because the dose makes her way too tired through the day. She denies any breakthrough symptoms and has been using decreased dose for a few weeks.   -She is due to follow up with her psychiatric provider in the next few weeks as well. Counseled her to discuss these symptoms and discuss formal dose reduction to be prescribed.     Obesity (BMI 39.08)  -Patient does not meet morbid/severe criteria for obesity on admission   -Continued outpatient follow-up with PCP, healthy dietary and lifestyle changes as able      Other:   -I have reviewed the patient's chart and documentation/notes from Primary Care / PCP / Family Medicine, Cardiology, Pulmonology, Orthopedics, CPM / PAT, and Anesthesiology in the inpatient and/or outpatient setting   -I have reviewed the patient's most recent lab values in the chart (greater detail below)  -I have reconciled the patient's outside information and chart during this consultation, including: allergies, medications (confirmed at bedside as well), prior diagnoses, and immunizations.     Care / Plan Discussed With: Patient; Patient's nurse (Leticia) who was present at the bedside during exam    Saumya Holly PA-C  General Inpatient Medicine (\"IMS\") / Hospitalist Service  Available via weipasst [phone]. Outside of these hours, please contact providers assigned to the team and/or via the Medicine Acute Pager.  Thank you for including the hospitalist service in the care of your patient. We will continue to follow while they remain admitted.     I spent a total of 50 minutes in the overall professional care of this patient on this date of " service.    Dragon dictation software was used to dictate this note and thus there may be minor errors in translation/transcription including garbled speech or misspellings. Please contact for clarification if needed.    HISTORY OF PRESENT ILLNESS:     Primary Source: Patient  Secondary / Collateral Source (if applicable): N/A    History Of Present Illness:    Chloe Mclean is a 45 y.o. female with a significant past medical history of COPD / asthma overlap (no baseline O2), DASHA (no PAP therapy), allergic rhinitis, seasonal allergies, nicotine dependence (vaping), anxiety, depression, schizophrenia, obesity, who was admitted to the orthopedic service s/p R TKA revision (both components) 2/2 loosening of the prosthesis. Medicine was consulted for medical management. Patient seen and examined with her nurse, Leticia, present at the bedside. The patient reports that her pain is under moderate control and that she is overall predominantly sore through her entire RLE. There is no associated distal paresthesias, numbness, tingling; she has good pulses and sensation of the RLE/foot. She just rec'd pain medication and willing to see if that works for her before trying anything else or making adjustments. Otherwise, unfortunately, her room is quite stuffy and there is not much airflow but it is improved with use of the small bedside fans blowing air on her face. She is dealing with some nausea post-op, no emesis; she does have a history of PONV in the past. Otherwise, we discussed that she takes 200mg of Seroquel nightly instead of prescribed 400mg nightly and has been doing this for a few weeks without any new symptoms or concerns re: her mental health. She is going to follow up with her psychiatric provider to discuss possible formal dose reduction 2/2 fairly severe fatigue through the day whenever she takes 400mg at night. She otherwise denies cp/pressure, palpitations, diaphoresis, SOB, GREEN, dizziness / lightheadedness,  syncope or near syncope, HA, vision changes, f/c/v/d/abd pain. Patient is aware of hospitalist service presence in-house overnight if questions, concerns, or any changes in condition arise. All questions answered to the best of my ability.     Review of Systems:   I performed a complete 12 point review of systems and it is negative except as noted in HPI or above. All other systems have been reviewed and are negative.    PAST HISTORIES:     Past Medical History:  She has a past medical history of Anxiety and depression, Asthma, COPD (chronic obstructive pulmonary disease) (Multi), Fungal infection of lung (07/12/2021), H/O being hospitalized (07/2009), History of venous thromboembolism, Lung mass, Lupus, Mucus plugging of bronchi (07/2021), Obstructive sleep apnea syndrome (9/7/2023), Osteoarthritis, PONV (postoperative nausea and vomiting), Psoriatic arthritis (Multi), Schizophrenia, Seasonal allergies, and Snores (12/2023).    Past Surgical History:  She has a past surgical history that includes Total knee arthroplasty (Right, 11/2012); Knee arthroscopy w/ debridement (Right, 12/08/2008); ORIF pelvic fracture (Left, 07/07/2021); Bronchoscopy (Left); Pelvis debridement (Left, 09/15/2021); Pelvis debridement (Left, 10/14/2021); Appendectomy; Hysterectomy; Cholecystectomy; and Total hip arthroplasty (Left, 05/17/2024).      Social History:  She reports that she has quit smoking. Her smoking use included cigarettes. She has never used smokeless tobacco. She reports current drug use. Frequency: 7.00 times per week. Drug: Marijuana. She reports that she does not drink alcohol.    Family History:  Family History[1]    Allergies:  Iodine and Latex    OBJECTIVE:     Last Recorded Vitals:  Vitals:    06/11/25 1545 06/11/25 1550 06/11/25 1600 06/11/25 1615   BP:  139/86 138/90    Pulse: 79  80 90   Resp: 11 11 24   Temp:       TempSrc:       SpO2: 98%  98% 100%   Weight:       Height:         /90   Pulse 90   Temp  "36.9 °C (98.5 °F) (Temporal)   Resp 24   Ht 1.727 m (5' 8\")   Wt 117 kg (257 lb)   SpO2 100%   BMI 39.08 kg/m²      Physical Exam:  Vital signs and nursing notes reviewed.   Constitutional: Pleasant and cooperative. Laying in bed in no acute distress. Conversant.   Skin: Warm and dry; Post-op dressing C/D/I  Eyes: EOMI. Anicteric sclera. Glasses in place.   ENT: Mucous membranes moist; no obvious injury or deformity appreciated.   Head and Neck: Normocephalic, atraumatic. ROM preserved. Trachea midline. No appreciable JVD.   Respiratory: Nonlabored on NC. Lungs clear to auscultation bilaterally without obvious adventitious sounds. Chest rise is equal.  Cardiovascular: RRR. No gross murmur, gallop, or rub. Extremities are warm and well-perfused.   Chest Wall: No chest wall tenderness.   Gastrointestinal: Abdomen soft, obese, nontender, nondistended. No obvious organomegaly appreciated. Bowel sounds are present.  Musculoskeletal: RLE with post-op dressing C/D/I, I did not remove dressings. Distal NVS intact.   Extremities: RLE as noted above, otherwise no cyanosis, edema, or clubbing evident. Neurovascularly intact.   Neurologic: A&Ox3. CN 2-12 grossly intact. Able to respond to questions appropriately and clearly. No acute focal neurologic deficits appreciated.  Psychiatric: Appropriate mood and behavior.    MEDICATIONS:     Inpatient Medications:  Scheduled Medications[2]  PRN Medications[3]    Outpatient Medications:  Prior to Admission medications    Medication Sig Start Date End Date Taking? Authorizing Provider   albuterol (ProAir HFA) 90 mcg/actuation inhaler Inhale 2 puffs every 4 hours if needed for wheezing. 5/6/25 5/6/26 Yes Edilberto King MD   albuterol (ProAir HFA) 90 mcg/actuation inhaler Inhale 2 puffs every 4 hours if needed for wheezing or shortness of breath. 5/6/25 5/6/26 Yes Edilberto King MD   albuterol 2.5 mg /3 mL (0.083 %) nebulizer solution Take 3 mL (2.5 mg) by nebulization 4 times a " day as needed for wheezing or shortness of breath. 5/6/25 11/2/25 Yes Edilberto King MD   budesonide-formoterol (Breyna) 160-4.5 mcg/actuation inhaler Inhale 2 puffs 2 times a day. Rinse mouth with water after use to reduce aftertaste and incidence of candidiasis. Do not swallow. 6/3/25  Yes Edilberto King MD   chlorhexidine (Peridex) 0.12 % solution Swish and Spit 15 ml the night before and the morning of surgery. (2 Doses) 5/28/25  Yes ENRRIQUE Peter   fluticasone (Flonase) 50 mcg/actuation nasal spray Administer 2 sprays into each nostril once daily. Shake gently. Before first use, prime pump. After use, clean tip and replace cap. 5/6/25 11/2/25 Yes Edilberto King MD   fluticasone propion-salmeteroL (Wixela Inhub) 500-50 mcg/dose diskus inhaler Inhale 1 puff 2 times a day. Rinse mouth with water after use to reduce aftertaste and incidence of candidiasis. Do not swallow. 5/6/25 5/6/26 Yes Edilberto King MD   QUEtiapine (SEROquel) 25 mg tablet 1 tablet (25 mg). 1/9/25  Yes Historical Provider, MD   QUEtiapine (SEROquel) 400 mg tablet Take 1 tablet (400 mg) by mouth once daily at bedtime. 7/9/23  Yes Historical Provider, MD   acetaminophen (Tylenol) 325 mg tablet Take 3 tablets (975 mg) by mouth every 8 hours if needed for mild pain (1 - 3). 6/11/25   Ramya Rendon,    cetirizine (ZyrTEC) 10 mg tablet Take 1 tablet (10 mg) by mouth once daily. 5/6/25 5/6/26  Edilberto King MD   cholecalciferol (Vitamin D-3) 25 mcg (1,000 units) capsule Take 1 capsule (25 mcg) by mouth once daily.    Historical Provider, MD   cyclobenzaprine (Flexeril) 5 mg tablet Take 1 tablet (5 mg) by mouth 3 times a day as needed for muscle spasms for up to 10 days. 6/11/25 6/21/25  Ramya Rendon DO   doxycycline (Monodox) 100 mg capsule Take 1 capsule (100 mg) by mouth 2 times a day. To prevent infection 6/11/25 7/23/25  Ramya Rendon DO   oxyCODONE (Roxicodone) 5 mg immediate release tablet Take 1 tablet (5 mg) by mouth  every 6 hours if needed for severe pain (7 - 10) for up to 7 days. 6/11/25 6/18/25  Ramya Rendon, DO   pantoprazole (ProtoNix) 40 mg EC tablet Take 1 tablet (40 mg) by mouth once daily as needed (heartburn). Do not crush, chew, or split. 6/11/25 7/11/25  Ramya Rendon, DO   rivaroxaban (Xarelto) 10 mg tablet Take 1 tablet (10 mg) by mouth once daily. To prevent blood clots 6/11/25 7/11/25  Ramya Rendon, DO   sennosides (Senokot) 8.6 mg tablet Take 1 tablet (8.6 mg) by mouth 2 times a day. To prevent constipation 6/11/25 7/11/25  Ramya Rendon, DO   traMADol (Ultram) 50 mg tablet Take 1 tablet (50 mg) by mouth every 6 hours if needed for severe pain (7 - 10) for up to 7 days. 6/11/25 6/18/25  Ramya Rendon DO       LABS AND IMAGING:     Labs:  Recent labs personally reviewed in the EMR (from date: 05/13/25), including: CBC, CMP, and TSH, A1c    Most recent CBC: WBC 6.2, Hgb 12.8, Plts 249.   Most recent chemistries: glucose 94, Na 140, K 4.2, BUN 9, sCr 0.71, alk phos 92, ALT 7, AST 9, bilirubin 0.3.   Most recent A1c: 5.4%  Most recent TSH: 2.15    Imaging: I have reviewed the patient's post-operative Knee XR and agree with impression as dictated below from the radiologist.   Imaging  XR knee right 1-2 views  Result Date: 6/11/2025  Revision constrained total knee arthroplasty without periprosthetic fracture or immediate hardware complication.     Signed by: Eulogio Goodwin 6/11/2025 4:21 PM Dictation workstation:   [id number]      Cardiology, Vascular, and Other Imaging  No other imaging results found for the past 7 days       [1]   Family History  Problem Relation Name Age of Onset    Anesthesia problems Mother          delayed emergence   [2] acetaminophen, 650 mg, oral, q6h GLENROY  ceFAZolin, 2 g, intravenous, q8h  cetirizine, 10 mg, oral, Daily  [START ON 6/12/2025] enoxaparin, 40 mg, subcutaneous, Daily  fluticasone, 2 spray, Each Nostril, Daily  fluticasone furoate-vilanteroL, 1 puff, inhalation,  Daily  [START ON 6/12/2025] pantoprazole, 40 mg, oral, Daily before breakfast  [START ON 6/12/2025] QUEtiapine, 25 mg, oral, Daily  QUEtiapine, 400 mg, oral, Nightly  scopolamine, 1 patch, transdermal, q72h  sennosides, 2 tablet, oral, BID    [3] PRN medications: albuterol, albuterol, benzocaine-menthol, bisacodyl, cyclobenzaprine, metoclopramide **OR** metoclopramide, naloxone, ondansetron ODT **OR** ondansetron, oxyCODONE, oxyCODONE

## 2025-06-11 NOTE — ANESTHESIA PROCEDURE NOTES
"Peripheral Block    Patient location during procedure: pre-op  Medication administered at: 6/11/2025 11:45 AM  End time: 6/11/2025 11:49 AM  Reason for block: procedure for pain, at surgeon's request and post-op pain management  Staffing  Performed: CRNA   Authorized by: ABIMAEL Aguilera    Performed by: ABIMAEL Rose  Preanesthetic Checklist  Completed: patient identified, IV checked, site marked, risks and benefits discussed, surgical consent, monitors and equipment checked, pre-op evaluation and timeout performed   Timeout performed at: 6/11/2025 11:40 AM  Peripheral Block  Patient position: laying flat  Prep: ChloraPrep  Patient monitoring: heart rate, cardiac monitor and continuous pulse ox  Block type: adductor canal  Laterality: right  Injection technique: single-shot  Local infiltration: ropivacaine  Infiltration strength: 0.5 %  Dose: 20 mL  Needle  Needle gauge: 20 G  Needle localization: ultrasound guidance  Assessment  Injection assessment: negative aspiration for heme, no paresthesia on injection, incremental injection and local visualized surrounding nerve on ultrasound  Paresthesia pain: none  Heart rate change: no  Slow fractionated injection: yes  Additional Notes  Anesthesia consult was placed by Dr. Rendon for post procedural analgesia.  The patient's chart was reviewed and they were deemed an appropriate candidate for the procedure. The patient was educated in detail on the risks, benefits, and alternatives to the block including but not limited to: temporary or permanent nerve damage, bleeding, infection, damage to surrounding tissues, possible block failure and the potential for local anesthesia toxicity syndrome.  The patient expressed understanding and all questions were answered prior to the initiation of the procedure.  Informed consent was also signed by the patient and laterality determined per institutional policy.  A formal \"time out \"consistent with the institutions " rules and regulations was performed by the anesthesia provider and appropriate RN.     Procedure  The patient was placed in the supine position. The RIGHT side was marked and skin prep applied and allowed to dry. Proper monitors were applied with oxygen.  Sedation was provided by administering     Versed 2 mg IV  Fentanyl 100 mcg IV    A high frequency linear ultrasound probe with probe cover was placed over the anterior/ medial thigh and saphenous nerve with adductor canal vascular structures identified using sterile coupling gel following institutional skin prep. The distal femoral vein was easily collapsible, and distal femoral artery in the adductor canal found to be grossly normal under color Doppler flow prior to procedure.  An 20g 4inch stimuplex needle was then advanced maintaining an in-plane visualization throughout the procedure, under ultrasound guidance from lateral to medial to come to rest just deep to the neurovascular sheath, but avoiding contact of the saphenous nerve.  Upon negative aspiration, 5ml of 1% lidocaine with epinephrine 1:100,000, 20 ml 0.5% Ropivacaine with 4 mg decadron preservative free was administered safely and cautiously around the nerve structure.  Aspiration every 3-5mls was done to avoid potential intravascular injection.  All injections were done without resistance and were free of blood.  The patient tolerated the procedure well without report of intense pain, tinnitus, metallic taste, or circumoral numbness.  The block was then evaluated after a few minutes and appeared to be functioning appropriately.

## 2025-06-12 PROCEDURE — 2500000002 HC RX 250 W HCPCS SELF ADMINISTERED DRUGS (ALT 637 FOR MEDICARE OP, ALT 636 FOR OP/ED): Performed by: STUDENT IN AN ORGANIZED HEALTH CARE EDUCATION/TRAINING PROGRAM

## 2025-06-12 PROCEDURE — 97116 GAIT TRAINING THERAPY: CPT | Mod: GP,CQ

## 2025-06-12 PROCEDURE — 97165 OT EVAL LOW COMPLEX 30 MIN: CPT | Mod: GO | Performed by: OCCUPATIONAL THERAPIST

## 2025-06-12 PROCEDURE — 99231 SBSQ HOSP IP/OBS SF/LOW 25: CPT | Performed by: PHYSICIAN ASSISTANT

## 2025-06-12 PROCEDURE — 2500000001 HC RX 250 WO HCPCS SELF ADMINISTERED DRUGS (ALT 637 FOR MEDICARE OP): Performed by: STUDENT IN AN ORGANIZED HEALTH CARE EDUCATION/TRAINING PROGRAM

## 2025-06-12 PROCEDURE — 97161 PT EVAL LOW COMPLEX 20 MIN: CPT | Mod: GP

## 2025-06-12 PROCEDURE — 2500000004 HC RX 250 GENERAL PHARMACY W/ HCPCS (ALT 636 FOR OP/ED): Performed by: ORTHOPAEDIC SURGERY

## 2025-06-12 PROCEDURE — 97530 THERAPEUTIC ACTIVITIES: CPT | Mod: GP

## 2025-06-12 PROCEDURE — 2500000001 HC RX 250 WO HCPCS SELF ADMINISTERED DRUGS (ALT 637 FOR MEDICARE OP): Performed by: ORTHOPAEDIC SURGERY

## 2025-06-12 PROCEDURE — 1100000001 HC PRIVATE ROOM DAILY

## 2025-06-12 PROCEDURE — 97110 THERAPEUTIC EXERCISES: CPT | Mod: GP,CQ

## 2025-06-12 RX ADMIN — ONDANSETRON 4 MG: 4 TABLET, ORALLY DISINTEGRATING ORAL at 01:20

## 2025-06-12 RX ADMIN — ACETAMINOPHEN 650 MG: 325 TABLET ORAL at 00:13

## 2025-06-12 RX ADMIN — ENOXAPARIN SODIUM 40 MG: 40 INJECTION SUBCUTANEOUS at 08:57

## 2025-06-12 RX ADMIN — SENNOSIDES 17.2 MG: 8.6 TABLET, FILM COATED ORAL at 20:42

## 2025-06-12 RX ADMIN — ONDANSETRON 4 MG: 4 TABLET, ORALLY DISINTEGRATING ORAL at 12:17

## 2025-06-12 RX ADMIN — OXYCODONE HYDROCHLORIDE 10 MG: 5 TABLET ORAL at 20:42

## 2025-06-12 RX ADMIN — ACETAMINOPHEN 650 MG: 325 TABLET ORAL at 06:01

## 2025-06-12 RX ADMIN — QUETIAPINE FUMARATE 200 MG: 100 TABLET ORAL at 20:42

## 2025-06-12 RX ADMIN — OXYCODONE HYDROCHLORIDE 10 MG: 5 TABLET ORAL at 09:58

## 2025-06-12 RX ADMIN — ACETAMINOPHEN 650 MG: 325 TABLET ORAL at 12:17

## 2025-06-12 RX ADMIN — PANTOPRAZOLE SODIUM 40 MG: 40 TABLET, DELAYED RELEASE ORAL at 06:01

## 2025-06-12 RX ADMIN — CETIRIZINE HYDROCHLORIDE 10 MG: 10 TABLET, FILM COATED ORAL at 08:57

## 2025-06-12 RX ADMIN — ACETAMINOPHEN 650 MG: 325 TABLET ORAL at 17:17

## 2025-06-12 RX ADMIN — OXYCODONE HYDROCHLORIDE 10 MG: 5 TABLET ORAL at 01:19

## 2025-06-12 RX ADMIN — CEFAZOLIN SODIUM 2 G: 2 SOLUTION INTRAVENOUS at 04:44

## 2025-06-12 RX ADMIN — SENNOSIDES 17.2 MG: 8.6 TABLET, FILM COATED ORAL at 08:57

## 2025-06-12 RX ADMIN — SODIUM CHLORIDE, SODIUM LACTATE, POTASSIUM CHLORIDE, AND CALCIUM CHLORIDE 100 ML/HR: .6; .31; .03; .02 INJECTION, SOLUTION INTRAVENOUS at 04:44

## 2025-06-12 RX ADMIN — OXYCODONE HYDROCHLORIDE 10 MG: 5 TABLET ORAL at 15:25

## 2025-06-12 RX ADMIN — OXYCODONE HYDROCHLORIDE 10 MG: 5 TABLET ORAL at 06:01

## 2025-06-12 ASSESSMENT — ENCOUNTER SYMPTOMS
HEMATURIA: 0
WEAKNESS: 0
BACK PAIN: 0
FEVER: 0
FLANK PAIN: 0
PALPITATIONS: 0
TROUBLE SWALLOWING: 0
CHILLS: 0
DYSURIA: 0
NAUSEA: 0
JOINT SWELLING: 0
SORE THROAT: 0
LIGHT-HEADEDNESS: 0
FATIGUE: 0
APPETITE CHANGE: 0
HEADACHES: 0
EYE PAIN: 0
ABDOMINAL PAIN: 0
WHEEZING: 0
DIAPHORESIS: 0
BLOOD IN STOOL: 0
BRUISES/BLEEDS EASILY: 0
CONSTIPATION: 0
DIARRHEA: 0
FACIAL SWELLING: 0
NUMBNESS: 0
VOMITING: 0
WOUND: 0
DIZZINESS: 0
HALLUCINATIONS: 0
FREQUENCY: 0
SHORTNESS OF BREATH: 0
COUGH: 0
CHEST TIGHTNESS: 0

## 2025-06-12 ASSESSMENT — PAIN SCALES - GENERAL
PAINLEVEL_OUTOF10: 8
PAINLEVEL_OUTOF10: 7
PAINLEVEL_OUTOF10: 8
PAINLEVEL_OUTOF10: 8
PAINLEVEL_OUTOF10: 4
PAINLEVEL_OUTOF10: 5 - MODERATE PAIN
PAINLEVEL_OUTOF10: 6
PAINLEVEL_OUTOF10: 9
PAINLEVEL_OUTOF10: 5 - MODERATE PAIN
PAINLEVEL_OUTOF10: 6
PAINLEVEL_OUTOF10: 4
PAINLEVEL_OUTOF10: 3
PAINLEVEL_OUTOF10: 7

## 2025-06-12 ASSESSMENT — PAIN DESCRIPTION - DESCRIPTORS
DESCRIPTORS: ACHING
DESCRIPTORS: ACHING
DESCRIPTORS: BURNING
DESCRIPTORS: ACHING

## 2025-06-12 ASSESSMENT — COGNITIVE AND FUNCTIONAL STATUS - GENERAL
WALKING IN HOSPITAL ROOM: A LITTLE
TURNING FROM BACK TO SIDE WHILE IN FLAT BAD: A LITTLE
MOVING TO AND FROM BED TO CHAIR: A LITTLE
CLIMB 3 TO 5 STEPS WITH RAILING: A LITTLE
MOBILITY SCORE: 20
CLIMB 3 TO 5 STEPS WITH RAILING: TOTAL
MOBILITY SCORE: 18
MOBILITY SCORE: 20
DAILY ACTIVITIY SCORE: 22
HELP NEEDED FOR BATHING: A LITTLE
DRESSING REGULAR LOWER BODY CLOTHING: A LITTLE
DAILY ACTIVITIY SCORE: 22
TOILETING: A LITTLE
CLIMB 3 TO 5 STEPS WITH RAILING: A LITTLE
STANDING UP FROM CHAIR USING ARMS: A LITTLE
STANDING UP FROM CHAIR USING ARMS: A LITTLE
MOVING TO AND FROM BED TO CHAIR: A LITTLE
MOVING TO AND FROM BED TO CHAIR: A LITTLE
WALKING IN HOSPITAL ROOM: A LITTLE
STANDING UP FROM CHAIR USING ARMS: A LITTLE
CLIMB 3 TO 5 STEPS WITH RAILING: A LITTLE
TOILETING: A LITTLE
DRESSING REGULAR LOWER BODY CLOTHING: A LITTLE
TOILETING: A LITTLE
MOVING FROM LYING ON BACK TO SITTING ON SIDE OF FLAT BED WITH BEDRAILS: A LITTLE
DAILY ACTIVITIY SCORE: 21
WALKING IN HOSPITAL ROOM: A LITTLE
MOBILITY SCORE: 21
DRESSING REGULAR LOWER BODY CLOTHING: A LITTLE

## 2025-06-12 ASSESSMENT — PAIN DESCRIPTION - ORIENTATION
ORIENTATION: RIGHT

## 2025-06-12 ASSESSMENT — PAIN DESCRIPTION - LOCATION
LOCATION: KNEE
LOCATION: KNEE
LOCATION: LEG
LOCATION: KNEE
LOCATION: KNEE

## 2025-06-12 ASSESSMENT — ACTIVITIES OF DAILY LIVING (ADL)
ADL_ASSISTANCE: INDEPENDENT
BATHING_ASSISTANCE: MINIMAL
ADL_ASSISTANCE: INDEPENDENT

## 2025-06-12 NOTE — PROGRESS NOTES
Physical Therapy    Physical Therapy Evaluation    Patient Name: Chloe Mclean  MRN: [id number]  Today's Date: 6/12/2025   Time Calculation  Start Time: 0913  Stop Time: 0937  Time Calculation (min): 24 min  3324/3324-A    Assessment/Plan   PT Assessment  PT Assessment Results: Decreased strength, Decreased range of motion, Decreased mobility, Pain  Rehab Prognosis: Excellent  Barriers to Discharge Home: No anticipated barriers  Evaluation/Treatment Tolerance: Patient tolerated treatment well  Medical Staff Made Aware: Yes  End of Session Communication: Bedside nurse    Assessment Comment: Patient has begun her R LE exercises on her own, including ankle pumps and SLR. She is motivated to return to full independence, currently requiring SBA for bed mobility, transfers, and ambulation with BWW 80 feet. Deficits in R LE strength, ROM, pain, gait technique. Recommend continued PT with referral for LOW INTENSITY rehab.    End of Session Patient Position: Bed, 3 rail up, Alarm off, caregiver present (Working with OT.)  IP OR SWING BED PT PLAN  Inpatient or Swing Bed: Inpatient     06/12/25 0913   PT Plan   Treatment/Interventions Transfer training;Gait training;Stair training;Strengthening;Range of motion;Therapeutic exercise;Therapeutic activity;Home exercise program   PT Plan Ongoing PT   PT Frequency 3 times per week   PT Discharge Recommendations Low intensity level of continued care   Equipment Recommended upon Discharge   (Pt has a BWW at home.)   PT Recommended Transfer Status Assist x1   PT - OK to Discharge Yes  (Once medically cleared.)       Subjective     Current Problem:  1. Loosening of knee joint prosthesis, initial encounter  Referral to Home Health    acetaminophen (Tylenol) 325 mg tablet    cyclobenzaprine (Flexeril) 5 mg tablet    doxycycline (Monodox) 100 mg capsule    oxyCODONE (Roxicodone) 5 mg immediate release tablet    pantoprazole (ProtoNix) 40 mg EC tablet    sennosides (Senokot) 8.6 mg tablet     traMADol (Ultram) 50 mg tablet    rivaroxaban (Xarelto) 10 mg tablet        Problem List[1]    General Visit Information:  General  Reason for Referral: Revision R TKA.  Referred By: Ramya Rendon DO  Past Medical History Relevant to Rehab: 44 y/o female admitted due to loosening of knee joint prosthesis s/p R TKA revision. WBAT R LE. (PMHx:  COPD / asthma overlap (no baseline O2), DASHA (no PAP therapy), allergic rhinitis, seasonal allergies, nicotine dependence (vaping), anxiety, depression, schizophrenia, obesity)  Family/Caregiver Present: No  Prior to Session Communication: Bedside nurse  Patient Position Received: Bed, 3 rail up, Alarm on  General Comment: Patient alert, agreeable to participate in PT.    Home Living:  Home Living  Type of Home:  ([facility])  Lives With: Significant other  Home Adaptive Equipment: Walker rolling or standard  Home Layout: Multi-level, Able to live on main level with bedroom/bathroom, 1/2 bath on main level (8 steps + 8 steps to access 2nd level/ full bath.)  Home Access:  (1 CHAGO)  Bathroom Shower/Tub: Tub/shower unit  Bathroom Toilet:  (Has a toilet riser)  Bathroom Equipment: Shower chair with back  Home Living Comments: Boyfriend works; to have rotator cuff repair next Monday.    Prior Level of Function:  Prior Function Per Pt/Caregiver Report  Level of [place]: Independent with ADLs and functional transfers, Independent with homemaking with ambulation  ADL Assistance: Independent  Homemaking Assistance: Independent  Ambulatory Assistance: Independent (Without AD)  Vocational: On disability  Prior Function Comments: (-) falls. Family/ friends provide transportation. Pt does not drive.    Precautions:  Precautions  Precautions Comment: Fall precautions. WBAT R LE. +wound vac    Vital Signs:     Objective     Pain:  Pain Assessment  Pain Assessment: 0-10  0-10 (Numeric) Pain Score: 6  Pain Type: Acute pain, Surgical pain  Pain Location: Knee  Pain Orientation:  400 Right  Pain Descriptors: Burning  Effect of Pain on Daily Activities: Increased to 7/10 after ambulation.  Pain Interventions: Ambulation/increased activity, Cold applied    Cognition:  Cognition  Orientation Level: Oriented X4  Safety/Judgement: Within Functional Limits  Insight: Within function limits    General Assessments:  General Observation  General Observation: Instructed in R LE HEP, completing quad sets, heel slides, SLR, SAQ, LAQ, hamstring curls x 5 reps each. Provided written instructions for pt referral. Assessed pt awareness of stair negotiation technique; pt correctly states sequencing.   Activity Tolerance  Endurance: Tolerates 10 - 20 min exercise with multiple rests  Sensation  Light Touch: No apparent deficits  Strength  Strength Comments: L LE WFL. R LE 3/5.  Perception  Inattention/Neglect: Appears intact  Coordination  Movements are Fluid and Coordinated: Yes     Static Sitting Balance  Static Sitting-Comment/Number of Minutes: independent  Dynamic Sitting Balance  Dynamic Sitting-Comments: MOD I  Static Standing Balance  Static Standing-Comment/Number of Minutes: SBA with BWW.    Functional Assessments:     Bed Mobility  Bed Mobility:  (Supine <> sit MOD I. HOB elevated.)  Transfers  Transfer:  (Sit <> stand SBA. VC to slowly progress into R knee flexion as tolerated with tranfsers.)  Ambulation/Gait Training  Ambulation/Gait Training Performed:  (Ambulated 80 feet with WW, SBA, rigid R LE. VC with visual demo provided for knee flexion during gait cycle with pt implementing well.)  Stairs  Stairs:  (Not addressed with OT entering the room.)       Extremity/Trunk Assessments:        RLE   RLE :  (R knee 16-85 degrees.)  LLE   LLE : Within Functional Limits    Outcome Measures:     [facility] Basic Mobility  Turning from your back to your side while in a flat bed without using bedrails: None  Moving from lying on your back to sitting on the side of a flat bed without using bedrails: None  Moving to  and from bed to chair (including a wheelchair): None  Standing up from a chair using your arms (e.g. wheelchair or bedside chair): None  To walk in hospital room: None  Climbing 3-5 steps with railing: Total  Basic Mobility - Total Score: 21                                                             Goals:  Encounter Problems       Encounter Problems (Active)       To improve strength, ROM, overall functional independence:        Patient will participate in R knee exercises and flexibility x 15 repetitions to increase R knee flexion during gait cycle 150 feet with BWW and SBA.       Start:  06/12/25    Expected End:  06/26/25            Patient will negotiate 1 step with WW and SBA.        Start:  06/12/25    Expected End:  06/26/25                 Education Documentation  Handouts, taught by Carlita Herman PT at 6/12/2025 12:23 PM.  Learner: Patient  Readiness: Eager  Method: Explanation  Response: Verbalizes Understanding, Demonstrated Understanding  Comment: Educated pt in HEP/technique, progression for R knee flexion with transfers, and normalized gait technique.    Home Exercise Program, taught by Carlita Herman PT at 6/12/2025 12:23 PM.  Learner: Patient  Readiness: Eager  Method: Explanation  Response: Verbalizes Understanding, Demonstrated Understanding  Comment: Educated pt in HEP/technique, progression for R knee flexion with transfers, and normalized gait technique.    Mobility Training, taught by Carlita Herman PT at 6/12/2025 12:23 PM.  Learner: Patient  Readiness: Eager  Method: Explanation  Response: Verbalizes Understanding, Demonstrated Understanding  Comment: Educated pt in HEP/technique, progression for R knee flexion with transfers, and normalized gait technique.    Education Comments  No comments found.                   [1]   Patient Active Problem List  Diagnosis    Bipolar depression (Multi)    Closed displaced fracture of neck of right fifth metacarpal bone    Acetabulum  fracture, left    Fracture, hip (Multi)    Hemoptysis    Systemic lupus erythematosus, unspecified    MSSA (methicillin susceptible Staphylococcus aureus) infection    Pelvic abscess in female    Right knee pain    Seborrheic dermatitis of scalp    Urinary retention    Weight gain    Wound infection after surgery    Medicare annual wellness visit, subsequent    Hyperglycemia    Anemia    Exertional shortness of breath    Vaping nicotine dependence, tobacco product    Advance directive discussed with patient    Abnormal computerized axial tomography of chest    Allergic rhinitis    HERBERT positive    Aspiration pneumonia (Multi)    Asthma    Carpal tunnel syndrome of right wrist    Chronic schizophrenia (Multi)    Closed fracture of posterior wall of acetabulum (Multi)    Closed traumatic dislocation of hip (Multi)    Colitis    Facial cellulitis    Fibromyalgia    Insomnia due to medical condition    Lung mass    Moderate episode of recurrent major depressive disorder    Obesity with body mass index 30 or greater    Obstructive sleep apnea syndrome    Primary osteoarthritis of right knee    Arthropathic psoriasis, unspecified (Multi)    Pulmonary collapse    Rectal hemorrhage    Trichomonas vaginitis    Urinary tract infectious disease    Fracture of acetabulum    Fracture of bone of hip (Multi)    Acute bronchitis    Folliculitis    Acute dermatitis    Closed fracture of neck of fifth metacarpal bone    Vitamin D deficiency    Lupus erythematosus    Methicillin susceptible Staphylococcus aureus infection    Abscess of female pelvis    Rash and other nonspecific skin eruption    Arthralgia of hip    Knee pain    Seborrhea capitis    Tobacco dependence due to cigarettes    Retention of urine    Local infection of wound    Postoperative wound infection    Wound dehiscence    History of osteomyelitis    PONV (postoperative nausea and vomiting)    Chronic obstructive pulmonary disease, unspecified    Wheezing    Encounter  for screening for HIV    Need for hepatitis C screening test    Loosening of knee joint prosthesis    Loosening of knee joint prosthesis, initial encounter

## 2025-06-12 NOTE — PROGRESS NOTES
6/12/25 1129   06/12/25 1129   Discharge Planning   Living Arrangements Spouse/significant other   Support Systems Spouse/significant other   Assistance Needed none   Type of Residence Private residence   Home or Post Acute Services In home services   Type of Home Care Services Home OT;Home PT;Home nursing visits   Expected Discharge Disposition Home H   Does the patient need discharge transport arranged? No   Patient Choice   Provider Choice list and CMS website (https://medicare.gov/care-compare#search) for post-acute Quality and Resource Measure Data were provided and reviewed with: Patient   Patient / Family choosing to utilize agency / facility established prior to hospitalization No   Intensity of Service   Intensity of Service 0-30 min     Spoke with pt. Pt from home and lives in a townhouse with SO. Pt appeared alert and oriented. Pt states being independent and breann use of any assistive devices prior. Pt stated does have a walker for home going. Pt states so drives and is able to help pt obtain meds and get to appointments. Pt confirmed having portable wound vac at bedside for home going. Discussed HHC and pt is agreeable. Provided HHC list. Pt identified [facility] as AOC. Pt aware to reach out if  any other needs arise.   Treva Cordero RN, BSN, Daniel/ Mindi CELESTIN Supervisor

## 2025-06-12 NOTE — CARE PLAN
The patient's goals for the shift include      The clinical goals for the shift include remain HDS, report ppain =<5 during shift    Over the shift, the patient did not make progress toward the following goals. Barriers to progression include n/a. Recommendations to address these barriers include n/a.

## 2025-06-12 NOTE — CARE PLAN
Problem: Pain  Goal: Takes deep breaths with improved pain control throughout the shift  Outcome: Progressing  Goal: Turns in bed with improved pain control throughout the shift  Outcome: Progressing  Goal: Walks with improved pain control throughout the shift  Outcome: Progressing  Goal: Performs ADL's with improved pain control throughout shift  Outcome: Progressing  Goal: Participates in PT with improved pain control throughout the shift  Outcome: Progressing  Goal: Free from opioid side effects throughout the shift  Outcome: Progressing  Goal: Free from acute confusion related to pain meds throughout the shift  Outcome: Progressing     Problem: Safety - Adult  Goal: Free from fall injury  Outcome: Progressing     Problem: Discharge Planning  Goal: Discharge to home or other facility with appropriate resources  Outcome: Progressing     Problem: Chronic Conditions and Co-morbidities  Goal: Patient's chronic conditions and co-morbidity symptoms are monitored and maintained or improved  Outcome: Progressing     Problem: Nutrition  Goal: Nutrient intake appropriate for maintaining nutritional needs  Outcome: Progressing     Problem: Fall/Injury  Goal: Not fall by end of shift  Outcome: Progressing  Goal: Be free from injury by end of the shift  Outcome: Progressing  Goal: Verbalize understanding of personal risk factors for fall in the hospital  Outcome: Progressing  Goal: Verbalize understanding of risk factor reduction measures to prevent injury from fall in the home  Outcome: Progressing  Goal: Use assistive devices by end of the shift  Outcome: Progressing  Goal: Pace activities to prevent fatigue by end of the shift  Outcome: Progressing

## 2025-06-12 NOTE — PROGRESS NOTES
Chloe Mclean is a 45 y.o. female admitted for Loosening of knee joint prosthesis. Pharmacy reviewed the patient's [id number] medications and allergies for accuracy.    The list below reflects the PTA list prior to pharmacy medication history. A summary a changes to the PTA medication list has been listed below. Please review each medication in order reconciliation for additional clarification and justification.    Source of information: t2p     Medications added:  Bone and muscle tablet- 1 every day     Medications modified:  Flonase --> added prn   Seroquel 25mg --> 1 bid prn     Medications to be removed:  Albuterol inhaler- duplicate therapy   Peridex solution   Wixela inhaler     Medications of concern:  Breyna inhaler- hasn't started yet       Prior to Admission Medications   Prescriptions Last Dose Informant Patient Reported? Taking?   QUEtiapine (SEROquel) 25 mg tablet 6/10/2025  Yes Yes   Si tablet (25 mg).   QUEtiapine (SEROquel) 400 mg tablet 6/10/2025  Yes Yes   Sig: Take 1 tablet (400 mg) by mouth once daily at bedtime.   albuterol (ProAir HFA) 90 mcg/actuation inhaler 6/10/2025  No Yes   Sig: Inhale 2 puffs every 4 hours if needed for wheezing.   albuterol (ProAir HFA) 90 mcg/actuation inhaler 6/10/2025  No Yes   Sig: Inhale 2 puffs every 4 hours if needed for wheezing or shortness of breath.   albuterol 2.5 mg /3 mL (0.083 %) nebulizer solution 6/10/2025  No Yes   Sig: Take 3 mL (2.5 mg) by nebulization 4 times a day as needed for wheezing or shortness of breath.   budesonide-formoterol (Breyna) 160-4.5 mcg/actuation inhaler 6/10/2025  No Yes   Sig: Inhale 2 puffs 2 times a day. Rinse mouth with water after use to reduce aftertaste and incidence of candidiasis. Do not swallow.   cetirizine (ZyrTEC) 10 mg tablet 2025  No No   Sig: Take 1 tablet (10 mg) by mouth once daily.   chlorhexidine (Peridex) 0.12 % solution 2025  No Yes   Sig: Swish and Spit 15 ml the night before and the  morning of surgery. (2 Doses)   cholecalciferol (Vitamin D-3) 25 mcg (1,000 units) capsule 6/4/2025  Yes No   Sig: Take 1 capsule (25 mcg) by mouth once daily.   fluticasone (Flonase) 50 mcg/actuation nasal spray More than a month  No Yes   Sig: Administer 2 sprays into each nostril once daily. Shake gently. Before first use, prime pump. After use, clean tip and replace cap.   fluticasone propion-salmeteroL (Wixela Inhub) 500-50 mcg/dose diskus inhaler 6/10/2025  No Yes   Sig: Inhale 1 puff 2 times a day. Rinse mouth with water after use to reduce aftertaste and incidence of candidiasis. Do not swallow.      Facility-Administered Medications: None       MACARENA MACIAS

## 2025-06-12 NOTE — PROGRESS NOTES
Chloe Mclean is a 45 y.o. female on day 1 of admission presenting with Loosening of knee joint prosthesis.      Subjective   Chloe Mclean is a 45 y.o. female with past medical history s/f COPD / asthma overlap (no baseline O2), DASHA (no PAP therapy), allergic rhinitis, seasonal allergies, nicotine dependence (vaping), anxiety, depression, schizophrenia, obesity, who was admitted to the orthopedic service s/p R TKA revision (both components) 2/2 loosening of the prosthesis 6/11/25. Medicine was consulted for medical management.    6/12/2025: No acute events overnight. Vitals stable. No AM labs.  Patient reports that she discussed with Dr. Rendon who will be keeping her overnight  Discussed with Dr. Rendon who states this is the plan with eventual discharge to home tomorrow  Has hospital wound VAC on right knee at this time, will transition upon discharge to home-going unit.       Patient evaluated in the presence of Pharmacist and RN    Review of Systems   Constitutional:  Negative for appetite change, chills, diaphoresis, fatigue and fever.   HENT:  Negative for congestion, ear pain, facial swelling, hearing loss, nosebleeds, sore throat, tinnitus and trouble swallowing.    Eyes:  Negative for pain.   Respiratory:  Negative for cough, chest tightness, shortness of breath and wheezing.    Cardiovascular:  Negative for chest pain, palpitations and leg swelling.   Gastrointestinal:  Negative for abdominal pain, blood in stool, constipation, diarrhea, nausea and vomiting.   Genitourinary:  Negative for dysuria, flank pain, frequency, hematuria and urgency.   Musculoskeletal:  Negative for back pain and joint swelling.        + Right knee pain   Skin:  Negative for rash and wound.   Neurological:  Negative for dizziness, syncope, weakness, light-headedness, numbness and headaches.   Hematological:  Does not bruise/bleed easily.   Psychiatric/Behavioral:  Negative for behavioral problems, hallucinations and suicidal  ideas.           Objective     Last Recorded Vitals  /73 (BP Location: Right arm, Patient Position: Lying)   Pulse 71   Temp 36.2 °C (97.2 °F) (Temporal)   Resp 16   Wt 117 kg (257 lb)   SpO2 97%     Image Results  Imaging  XR knee right 1-2 views  Result Date: 6/11/2025  Revision constrained total knee arthroplasty without periprosthetic fracture or immediate hardware complication.     Signed by: Eulogio oGodwin 6/11/2025 4:21 PM Dictation workstation:   [id number]      Cardiology, Vascular, and Other Imaging  No other imaging results found for the past 7 days       Lab Results  No results found for this or any previous visit (from the past 24 hours).     Medications  Scheduled medications:  Scheduled Medications[1]  Continuous medications:  Continuous Medications[2]  PRN medications:  PRN Medications[3]     Physical Exam  Constitutional:       General: She is not in acute distress.     Appearance: Normal appearance. She is obese.   HENT:      Head: Normocephalic and atraumatic.      Right Ear: External ear normal.      Left Ear: External ear normal.      Nose: Nose normal.      Mouth/Throat:      Mouth: Mucous membranes are moist.      Pharynx: Oropharynx is clear.   Eyes:      Extraocular Movements: Extraocular movements intact.      Conjunctiva/sclera: Conjunctivae normal.      Pupils: Pupils are equal, round, and reactive to light.   Cardiovascular:      Rate and Rhythm: Normal rate and regular rhythm.      Pulses: Normal pulses.      Heart sounds: Normal heart sounds.   Pulmonary:      Effort: Pulmonary effort is normal. No respiratory distress.      Breath sounds: Normal breath sounds. No wheezing, rhonchi or rales.   Abdominal:      General: Bowel sounds are normal.      Palpations: Abdomen is soft.      Tenderness: There is no abdominal tenderness. There is no right CVA tenderness, left CVA tenderness, guarding or rebound.   Musculoskeletal:      Cervical back: Normal range of motion and neck  supple.      Comments: Post-operative dressing is C/D/I, did not remove dressing  NVS intact distal to operative site    Skin:     General: Skin is warm and dry.      Capillary Refill: Capillary refill takes less than 2 seconds.      Findings: No lesion or rash.   Neurological:      General: No focal deficit present.      Mental Status: She is alert and oriented to person, place, and time. Mental status is at baseline.   Psychiatric:         Mood and Affect: Mood normal.         Behavior: Behavior normal.                  Assessment/Plan        R TKA loosening of prosthesis s/p R TKA revision (both components) with Dr. Rendon on 06/11/25  -Post-op course per primary  -Pain control  -Constipation ppx  -PT/OT  -Incentive Spirometry   -DVT Prophylaxis: as per primary service (Lovenox --> Xarelto 10mg daily x 1 month)  -Wound VAC  -Doxycycline prophylaxis until 7/23/2025 per orthopedics     H/o provoked VTE (post-operative), remote  -Patient is currently on Lovenox and will be discharged on Xarelto out of abundance of caution   -Continue as directed by orthopedics     COPD / asthma without acute exacerbation   -Patient without evidence of decompensation at this time   -Continued on home therapies (scheduled Breo, prn albuterol MDI and prn nebs). Encourage pulmonary hygiene while admitted.    -Continue outpatient follow up with [facility] (Dr. King) as previously done. I reviewed the last note from her visits.      DASHA  -Patient does not currently use PAP therapy   -Continue follow up as scheduled      Allergic rhinitis, seasonal allergies   -Continue formulary alternative (Zyrtec) while admitted  -Continue home flonase      Nicotine Dependence (vapes nicotine containing substances)  -Cessation education provided during hospitalization   -NRT discussed and offered --> patient declines need for this presently. She will make myself or her nurse aware if she changes her mind.      Anxiety,  depression  Schizophrenia   -Patient is continued on home Seroquel nightly. She admits that she only uses 200mg nightly instead of prescribed 400mg because the dose makes her way too tired through the day. She denies any breakthrough symptoms and has been using decreased dose for a few weeks.   -She is due to follow up with her psychiatric provider in the next few weeks as well. Counseled her to discuss these symptoms and discuss formal dose reduction to be prescribed.      Obesity (BMI 39.08)  -Patient does not meet morbid/severe criteria for obesity on admission   -Continued outpatient follow-up with PCP, healthy dietary and lifestyle changes as able      Code Status: Full Code                 DVT ppx: Per primary    Thank you for involving us in the care of this very pleasant patient. We will follow along with you while they remain admitted. Please contact Hospitalist service if any clarification needed.      Please see orders for more complete plan    Casie Julio PA-C         [1] acetaminophen, 650 mg, oral, q6h GLENROY  cetirizine, 10 mg, oral, Daily  enoxaparin, 40 mg, subcutaneous, Daily  fluticasone, 2 spray, Each Nostril, Daily  fluticasone furoate-vilanteroL, 1 puff, inhalation, Daily  pantoprazole, 40 mg, oral, Daily before breakfast  QUEtiapine, 200 mg, oral, Nightly  scopolamine, 1 patch, transdermal, q72h  sennosides, 2 tablet, oral, BID    [2] lactated Ringer's, 100 mL/hr, Last Rate: 100 mL/hr (06/12/25 0613)  oxygen, 2 L/min, Last Rate: 2 L/min (06/11/25 1637)    [3] PRN medications: albuterol, albuterol, benzocaine-menthol, bisacodyl, cyclobenzaprine, metoclopramide **OR** metoclopramide, naloxone, ondansetron ODT **OR** ondansetron, oxyCODONE, oxyCODONE, QUEtiapine

## 2025-06-12 NOTE — PROGRESS NOTES
"POST-OPERATIVE PROGRESS NOTE      ============================  IMPRESSION/PLAN:  ============================  45 y.o. female s/p right knee revision arthroplasty completed on 6/11/2025 by Dr. Rendon    PLAN:  -Weightbearing: Weight bearing as tolerated  -Prevena to remain intact for 1 week postoperatively.  Patient to discharged home with portable home unit  -DVT Prophylaxis: Xarelto daily for 4 weeks postoperatively  -Continue PT/OT Eval  -Disposition: Home with home health  -Discharge Instructions in EMR  -Follow-up: Outpatient in 2 weeks with Dr. Rendon as previously scheduled        Chloe Mclean seen and examined at bedside. Doing well, no issues overnight. Pain controlled with current treatment plan.  Patient states she has not been up walking yet with therapy but she is feeling good so far this morning.    Review of Systems:   Constitutional: See HPI for pain assessment, No significant weight loss, recent trauma. Denies fevers/chills  Cardiovascular: No chest pain, shortness of breath  Respiratory: No difficulty breathing, cough  Gastrointestinal: No nausea, vomiting, diarrhea, constipation  Musculoskeletal: Noted in HPI, no arthralgias   Integumentary: No rashes, easy bruising, redness   Neurological: no numbness or tingling in extremities, no gait disturbances     Last Recorded Vitals  Blood pressure 113/73, pulse 71, temperature 36.2 °C (97.2 °F), temperature source Temporal, resp. rate 16, height 1.727 m (5' 8\"), weight 117 kg (257 lb), SpO2 97%.      Problem List[1]    =================================  EXAM  =================================  Constitutional   General appearance: Alert and in no acute distress. Well developed, well nourished.    Eyes   External Eye, Conjunctiva and lids: Normal external exam - extraocular movements intact (EOMI).   Ears, Nose, Mouth, and Throat   Hearing: Normal.   Neck   Neck: No neck mass was observed. Supple.   Pulmonary   Respiratory effort: No respiratory " distress.   Cardiovascular   Examination of extremities: No peripheral edema.   Psychiatric   Judgment and insight: Intact.   Orientation to person, place, and time: Alert and oriented x 3.   Mood and affect: Normal.   Musculoskeletal:   Prevena clean dry and intact.  No output to date.  Right lower extremity in neutral alignment.  Passive motion with mild discomfort.  Neurovascular intact distally to light touch.  Dorsalis pedis pulse 2+, capillary fill less than 2 seconds.  Negative Homans.      Karlene Thompson PA-C         [1]   Patient Active Problem List  Diagnosis    Bipolar depression (Multi)    Closed displaced fracture of neck of right fifth metacarpal bone    Acetabulum fracture, left    Fracture, hip (Multi)    Hemoptysis    Systemic lupus erythematosus, unspecified    MSSA (methicillin susceptible Staphylococcus aureus) infection    Pelvic abscess in female    Right knee pain    Seborrheic dermatitis of scalp    Urinary retention    Weight gain    Wound infection after surgery    Medicare annual wellness visit, subsequent    Hyperglycemia    Anemia    Exertional shortness of breath    Vaping nicotine dependence, tobacco product    Advance directive discussed with patient    Abnormal computerized axial tomography of chest    Allergic rhinitis    HERBERT positive    Aspiration pneumonia (Multi)    Asthma    Carpal tunnel syndrome of right wrist    Chronic schizophrenia (Multi)    Closed fracture of posterior wall of acetabulum (Multi)    Closed traumatic dislocation of hip (Multi)    Colitis    Facial cellulitis    Fibromyalgia    Insomnia due to medical condition    Lung mass    Moderate episode of recurrent major depressive disorder    Obesity with body mass index 30 or greater    Obstructive sleep apnea syndrome    Primary osteoarthritis of right knee    Arthropathic psoriasis, unspecified (Multi)    Pulmonary collapse    Rectal hemorrhage    Trichomonas vaginitis    Urinary tract infectious disease     Fracture of acetabulum    Fracture of bone of hip (Multi)    Acute bronchitis    Folliculitis    Acute dermatitis    Closed fracture of neck of fifth metacarpal bone    Vitamin D deficiency    Lupus erythematosus    Methicillin susceptible Staphylococcus aureus infection    Abscess of female pelvis    Rash and other nonspecific skin eruption    Arthralgia of hip    Knee pain    Seborrhea capitis    Tobacco dependence due to cigarettes    Retention of urine    Local infection of wound    Postoperative wound infection    Wound dehiscence    History of osteomyelitis    PONV (postoperative nausea and vomiting)    Chronic obstructive pulmonary disease, unspecified    Wheezing    Encounter for screening for HIV    Need for hepatitis C screening test    Loosening of knee joint prosthesis    Loosening of knee joint prosthesis, initial encounter

## 2025-06-12 NOTE — PROGRESS NOTES
Social work consult placed for discharge planning. SW reviewed pt's chart and communicated with TCC. No SW needs foreseen at this time. SW signing off; available upon request.    Isiah Ac, MSW, LSW ([id number])   Care Transitions

## 2025-06-12 NOTE — PROGRESS NOTES
Occupational Therapy    Evaluation    Patient Name: Chloe RAGSDALE Vinay  MRN: [id number]  Department: [facility] 3 E  [facility]  Today's Date: 6/12/2025  Time Calculation  Start Time: 0932  Stop Time: 0946  Time Calculation (min): 14 min    Assessment  IP OT Assessment  OT Assessment: Initial assessment complete; Pt demonstrates min A to supervision for all areas of occupational performance. Pt with good understanding of precautions, good safety awareness and strong support system to assist as needed in home setting.  No further acute intervention is indicated however, pt would benefit from LOW intensity at discharge to promote continued progress and carryover.  Prognosis: Good  Barriers to Discharge Home: No anticipated barriers  Evaluation/Treatment Tolerance: Patient tolerated treatment well  Medical Staff Made Aware: Yes  End of Session Communication: Bedside nurse  End of Session Patient Position: Bed, 2 rail up, Alarm on  Plan:  Treatment Interventions: ADL retraining, Functional transfer training, Patient/family training, Equipment evaluation/education, Compensatory technique education  OT Frequency: OT eval only  OT Discharge Recommendations: Low intensity level of continued care  OT Recommended Transfer Status: Stand by assist, Assist of 1  OT - OK to Discharge: Yes    Subjective   Current Problem:  1. Loosening of knee joint prosthesis, initial encounter  Referral to Home Health    acetaminophen (Tylenol) 325 mg tablet    cyclobenzaprine (Flexeril) 5 mg tablet    doxycycline (Monodox) 100 mg capsule    oxyCODONE (Roxicodone) 5 mg immediate release tablet    pantoprazole (ProtoNix) 40 mg EC tablet    sennosides (Senokot) 8.6 mg tablet    traMADol (Ultram) 50 mg tablet    rivaroxaban (Xarelto) 10 mg tablet        OT Visit Info:  OT Received On: 06/12/25  General Visit Info:  General  Reason for Referral: OT for ADL assessment  Referred By: Ramya Rendon DO  Past Medical History Relevant to Rehab: 45 YOF s/p R  knee revision. Pt presented with loosening of knee joint prosthesis  Family/Caregiver Present: No  Prior to Session Communication: Bedside nurse (PT)  Patient Position Received:  (pt sitting independently on EOB; PT  exiting room)  Preferred Learning Style: verbal, visual  General Comment: OT orders received and chart reviewed. Pt educated on reason for OT consultation and acute services. Pt agreeable to participate.  Precautions:  Hearing/Visual Limitations: WFL  LE Weight Bearing Status: Weight Bearing as Tolerated  Medical Precautions: Fall precautions    Pain:  Pain Assessment  Pain Assessment: 0-10  0-10 (Numeric) Pain Score: 8  Pain Type: Surgical pain  Pain Location: Leg  Pain Orientation: Right  Pain Descriptors: Aching  Pain Frequency: Constant/continuous  Pain Onset: Ongoing  Clinical Progression: Gradually improving  Effect of Pain on Daily Activities: increased pain with activity; improves with rest/elevation/icing  Patient's Stated Pain Goal: No pain  Pain Interventions: Repositioned, Ambulation/increased activity, Elevated, Emotional support, Cold pack  Response to Interventions: Content/relaxed    Objective   Cognition:  Overall Cognitive Status: Within Functional Limits  Orientation Level: Oriented X4  Attention: Within Functional Limits  Memory: Within Funtional Limits  Problem Solving: Within Functional Limits  Safety/Judgement: Within Functional Limits  Insight: Within function limits  Impulsive: Within functional limits  Processing Speed: Within funtional limits     Confusion Assessment Method (CAM)  Acute Onset and Fluctuating Course (1A): No  [place] Agitation Sedation Scale  [place]on Agitation Sedation Scale (RASS): Alert and calm  Home Living:  Type of Home: House (3 level town house)  Home Adaptive Equipment: Walker rolling or standard, Sock aid, Long-handled shoehorn, Long-handled sponge, Reacher  Home Layout: Multi-level, Able to live on main level with bedroom/bathroom (1/2 bath)  Home  Access:  (1 CHAGO)  Bathroom Shower/Tub: Tub/shower unit  Bathroom Toilet: Standard  Home Living Comments: pt with history of hip and knee replacement; all needs/equipment in place; pt has no concerns   Prior Function:  Level of [place]: Independent with ADLs and functional transfers, Independent with homemaking with ambulation  ADL Assistance: Independent  Homemaking Assistance: Independent  Ambulatory Assistance: Independent  Vocational: On disability  IADL History:  Current License: No  Mode of Transportation: Car  ADL:  Equipment: Reacher, Sock aid, Long-handled shoe horn (pt owns these items from previous procedures and demonstrates understanding of use)  Eating Assistance: Independent  Grooming Assistance: Independent  Bathing Assistance: Minimal  Bathing Deficit: Increased time to complete , Supervision/safety, Setup  UE Dressing Assistance: Independent  LE Dressing Assistance: Minimal  LE Dressing Deficit: Increased time to complete, Supervision/safety, Use of adaptive equipment  Toileting Assistance with Device: Stand by  Functional Assistance: Stand by  ADL Comments: Pt is SBA for ADL performance, supervision for safety; limited by pain, wound vac and IV at this time. Pt with good awareness of precautions and willing to delegate/request assistance when appropriate.  Activity Tolerance:  Endurance: Tolerates 10 - 20 min exercise with multiple rests  Bed Mobility/Transfers: Bed Mobility  Bed Mobility: Yes  Bed Mobility 1  Bed Mobility 1: Sitting to supine  Level of Assistance 1: Modified independent    Transfers  Transfer: Yes  Transfer 1  Transfer From 1: Bed to, Sit to  Transfer to 1: Stand  Technique 1: Sit to stand  Transfer Device 1: Walker  Transfer Level of Assistance 1:  (supervision)  Trials/Comments 1: x2  Transfers 2  Transfer From 2: Stand to, Commode-standard to  Transfer to 2: Commode-standard, Stand  Technique 2: Stand to sit, Sit to stand  Transfer Device 2: Walker  Transfer Level of  Assistance 2: Close supervision, +1 to manage equipment  Trials/Comments 2: pt demonstrates good awareness for safety      Functional Mobility:  Functional Mobility  Functional Mobility Performed: Yes  Functional Mobility 1  Surface 1: Level tile  Device 1: Rolling walker  Assistance 1: Close supervision, Contact guard, Minimal verbal cues  Quality of Functional Mobility 1:  (WFL)  Comments 1: Pt with use of FWW and assistance to manage lines ambulated 5 ft to/from [facility].  Sitting Balance:  Static Sitting Balance  Static Sitting-Level of Assistance: Independent  Dynamic Sitting Balance  Dynamic Sitting-Level of Assistance: Independent  Standing Balance:  Static Standing Balance  Static Standing-Balance Support: Bilateral upper extremity supported  Static Standing-Level of Assistance: Close supervision  Dynamic Standing Balance  Dynamic Standing-Balance Support: Bilateral upper extremity supported  Dynamic Standing-Level of Assistance: Close supervision, Contact guard  Dynamic Standing-Balance: Reaching for objects, Turning, Reaching across midline, Forward lean  Dynamic Standing-Comments: toileting; clothing management  Modalities:  Modalities Used: No  IADL's:   Current License: No  Mode of Transportation: Car  Vision: Vision - Basic Assessment  Current Vision: Wears glasses all the time  Sensation:  Light Touch: No apparent deficits  Sharp/Dull: No apparent deficits  Stereognosis: No apparent deficits  Proprioception: No apparent deficits  Strength:  Strength Comments: BUE WFL  Perception:  Inattention/Neglect: Appears intact  Initiation: Appears intact  Motor Planning: Appears intact  Perseveration: Not present  Coordination:  Movements are Fluid and Coordinated: Yes   Hand Function:  Hand Function  Gross Grasp: Functional  Coordination: Functional  Extremities: RUE   RUE : Within Functional Limits and LUE   LUE: Within Functional Limits      Outcome Measures: [facility] Daily Activity  Putting on and taking off regular  lower body clothing: A little  Bathing (including washing, rinsing, drying): A little  Putting on and taking off regular upper body clothing: None  Toileting, which includes using toilet, bedpan or urinal: A little  Taking care of personal grooming such as brushing teeth: None  Eating Meals: None  Daily Activity - Total Score: 21    Education:  Pt educated on reason for OT consultation and acute services; knee precautions; adaptive equipment; modifications for home mobility.  All education completed

## 2025-06-12 NOTE — PROGRESS NOTES
Physical Therapy    Physical Therapy Treatment    Patient Name: Chloe Mclean  MRN: [id number]  Department: [facility] E  Room: [facility]3324-A  Today's Date: 6/12/2025  Time Calculation  Start Time: 1430  Stop Time: 1454  Time Calculation (min): 24 min         Assessment/Plan   PT Assessment  Barriers to Discharge Home: No anticipated barriers  End of Session Communication: Bedside nurse  Assessment Comment: patient ROm 6- 70 self asssit. increased giat 200 feet  End of Session Patient Position: Bed, 3 rail up, Alarm off, not on at start of session  PT Plan  Inpatient/Swing Bed or Outpatient: Inpatient  PT Plan  Treatment/Interventions: Transfer training, Gait training, Stair training, Strengthening, Range of motion, Therapeutic exercise, Therapeutic activity, Home exercise program  PT Plan: Ongoing PT  PT Frequency: 3 times per week  PT Discharge Recommendations: Low intensity level of continued care  Equipment Recommended upon Discharge:  (Pt has a BWW at home.)  PT Recommended Transfer Status: Assist x1  PT - OK to Discharge: Yes (Once medically cleared.)    PT Visit Info:  PT Received On: 06/12/25  Response to Previous Treatment: Patient reporting fatigue but able to participate.     General Visit Information:   General  Prior to Session Communication: Bedside nurse  Patient Position Received: Bed, 3 rail up, Alarm off, not on at start of session  General Comment: patient in bed, states just finished  exercises.    Subjective   Precautions:  Precautions  Precautions Comment: Fall precautions. WBAT R LE. +wound vac            Objective   Pain:  Pain Assessment  Pain Assessment: 0-10  0-10 (Numeric) Pain Score: 6  Pain Interventions: Ambulation/increased activity, Cold applied  Response to Interventions: Decrease in pain (with gait)  Cognition:  Cognition  Overall Cognitive Status: Within Functional Limits    Activity Tolerance:  Activity Tolerance  Endurance: Tolerates 30 min exercise with multiple rests  Treatments:    did have patient show flexion. limited, so added sheet to self assist.  donned socks  independently. sit to stand  SBa, gait training with FWW  300 feet with SBA.  sBa for transfer in and out of bathroom. sit to supine sBA. new ice applied. reviewed home going instructions again.    Outcome Measures:  [facility] Basic Mobility  Turning from your back to your side while in a flat bed without using bedrails: None  Moving from lying on your back to sitting on the side of a flat bed without using bedrails: None  Moving to and from bed to chair (including a wheelchair): A little  Standing up from a chair using your arms (e.g. wheelchair or bedside chair): A little  To walk in hospital room: A little  Climbing 3-5 steps with railing: A little  Basic Mobility - Total Score: 20    Education Documentation  Handouts, taught by Effie Mtz PTA at 6/12/2025  3:10 PM.  Learner: Patient  Readiness: Acceptance  Method: Explanation  Response: Verbalizes Understanding    Home Exercise Program, taught by Effie Mtz PTA at 6/12/2025  3:10 PM.  Learner: Patient  Readiness: Acceptance  Method: Explanation  Response: Verbalizes Understanding    Mobility Training, taught by Effie Mtz PTA at 6/12/2025  3:10 PM.  Learner: Patient  Readiness: Acceptance  Method: Explanation  Response: Verbalizes Understanding    Education Comments  No comments found.        OP EDUCATION:       Encounter Problems       Encounter Problems (Active)       To improve strength, ROM, overall functional independence:        Patient will participate in R knee exercises and flexibility x 15 repetitions to increase R knee flexion during gait cycle 150 feet with BWW and SBA. (Progressing)       Start:  06/12/25    Expected End:  06/26/25            Patient will negotiate 1 step with WW and SBA.  (Progressing)       Start:  06/12/25    Expected End:  06/26/25

## 2025-06-13 ENCOUNTER — PHARMACY VISIT (OUTPATIENT)
Dept: PHARMACY | Facility: CLINIC | Age: 45
End: 2025-06-13
Payer: COMMERCIAL

## 2025-06-13 ENCOUNTER — DOCUMENTATION (OUTPATIENT)
Dept: HOME HEALTH SERVICES | Facility: HOME HEALTH | Age: 45
End: 2025-06-13
Payer: MEDICARE

## 2025-06-13 VITALS
BODY MASS INDEX: 38.95 KG/M2 | DIASTOLIC BLOOD PRESSURE: 79 MMHG | OXYGEN SATURATION: 97 % | SYSTOLIC BLOOD PRESSURE: 112 MMHG | HEART RATE: 78 BPM | HEIGHT: 68 IN | RESPIRATION RATE: 16 BRPM | WEIGHT: 257 LBS | TEMPERATURE: 97.1 F

## 2025-06-13 PROCEDURE — RXMED WILLOW AMBULATORY MEDICATION CHARGE

## 2025-06-13 PROCEDURE — 97116 GAIT TRAINING THERAPY: CPT | Mod: GP,CQ

## 2025-06-13 PROCEDURE — 2500000001 HC RX 250 WO HCPCS SELF ADMINISTERED DRUGS (ALT 637 FOR MEDICARE OP): Performed by: STUDENT IN AN ORGANIZED HEALTH CARE EDUCATION/TRAINING PROGRAM

## 2025-06-13 PROCEDURE — 2500000001 HC RX 250 WO HCPCS SELF ADMINISTERED DRUGS (ALT 637 FOR MEDICARE OP): Performed by: ORTHOPAEDIC SURGERY

## 2025-06-13 PROCEDURE — 97110 THERAPEUTIC EXERCISES: CPT | Mod: GP,CQ

## 2025-06-13 PROCEDURE — 99233 SBSQ HOSP IP/OBS HIGH 50: CPT | Performed by: PHYSICIAN ASSISTANT

## 2025-06-13 PROCEDURE — 2500000004 HC RX 250 GENERAL PHARMACY W/ HCPCS (ALT 636 FOR OP/ED): Performed by: ORTHOPAEDIC SURGERY

## 2025-06-13 RX ORDER — ONDANSETRON 4 MG/1
4 TABLET, ORALLY DISINTEGRATING ORAL EVERY 8 HOURS PRN
Qty: 5 TABLET | Refills: 0 | Status: SHIPPED | OUTPATIENT
Start: 2025-06-13

## 2025-06-13 RX ADMIN — OXYCODONE HYDROCHLORIDE 10 MG: 5 TABLET ORAL at 02:25

## 2025-06-13 RX ADMIN — CETIRIZINE HYDROCHLORIDE 10 MG: 10 TABLET, FILM COATED ORAL at 08:23

## 2025-06-13 RX ADMIN — PANTOPRAZOLE SODIUM 40 MG: 40 TABLET, DELAYED RELEASE ORAL at 06:32

## 2025-06-13 RX ADMIN — ACETAMINOPHEN 650 MG: 325 TABLET ORAL at 06:32

## 2025-06-13 RX ADMIN — OXYCODONE HYDROCHLORIDE 10 MG: 5 TABLET ORAL at 08:23

## 2025-06-13 RX ADMIN — SENNOSIDES 17.2 MG: 8.6 TABLET, FILM COATED ORAL at 08:23

## 2025-06-13 RX ADMIN — ACETAMINOPHEN 650 MG: 325 TABLET ORAL at 00:18

## 2025-06-13 RX ADMIN — ENOXAPARIN SODIUM 40 MG: 40 INJECTION SUBCUTANEOUS at 08:23

## 2025-06-13 ASSESSMENT — ENCOUNTER SYMPTOMS
NUMBNESS: 0
FEVER: 0
NAUSEA: 1
FACIAL SWELLING: 0
PALPITATIONS: 0
TROUBLE SWALLOWING: 0
DIZZINESS: 0
FLANK PAIN: 0
WHEEZING: 0
BRUISES/BLEEDS EASILY: 0
FREQUENCY: 0
BACK PAIN: 0
WEAKNESS: 0
DIAPHORESIS: 0
VOMITING: 0
CONSTIPATION: 0
DYSURIA: 0
BLOOD IN STOOL: 0
COUGH: 0
APPETITE CHANGE: 0
HALLUCINATIONS: 0
SORE THROAT: 0
WOUND: 0
JOINT SWELLING: 0
ABDOMINAL PAIN: 0
CHILLS: 0
HEMATURIA: 0
SHORTNESS OF BREATH: 0
LIGHT-HEADEDNESS: 0
DIARRHEA: 0
FATIGUE: 0
EYE PAIN: 0
CHEST TIGHTNESS: 0
HEADACHES: 0

## 2025-06-13 ASSESSMENT — PAIN DESCRIPTION - LOCATION
LOCATION: KNEE
LOCATION: KNEE

## 2025-06-13 ASSESSMENT — PAIN SCALES - GENERAL
PAINLEVEL_OUTOF10: 0 - NO PAIN
PAINLEVEL_OUTOF10: 7
PAINLEVEL_OUTOF10: 3
PAINLEVEL_OUTOF10: 7

## 2025-06-13 ASSESSMENT — PAIN - FUNCTIONAL ASSESSMENT
PAIN_FUNCTIONAL_ASSESSMENT: 0-10

## 2025-06-13 ASSESSMENT — COGNITIVE AND FUNCTIONAL STATUS - GENERAL
WALKING IN HOSPITAL ROOM: A LITTLE
STANDING UP FROM CHAIR USING ARMS: A LITTLE
MOBILITY SCORE: 20
WALKING IN HOSPITAL ROOM: A LITTLE
MOBILITY SCORE: 18
STANDING UP FROM CHAIR USING ARMS: A LITTLE
CLIMB 3 TO 5 STEPS WITH RAILING: A LITTLE
CLIMB 3 TO 5 STEPS WITH RAILING: TOTAL
DAILY ACTIVITIY SCORE: 22
DRESSING REGULAR LOWER BODY CLOTHING: A LITTLE
MOVING TO AND FROM BED TO CHAIR: A LITTLE
MOVING TO AND FROM BED TO CHAIR: A LITTLE
TOILETING: A LITTLE

## 2025-06-13 ASSESSMENT — PAIN DESCRIPTION - ORIENTATION
ORIENTATION: RIGHT
ORIENTATION: RIGHT

## 2025-06-13 ASSESSMENT — PAIN DESCRIPTION - DESCRIPTORS: DESCRIPTORS: ACHING

## 2025-06-13 NOTE — DISCHARGE SUMMARY
Discharge Diagnosis  Right Knee Revision Arthroplasty    Issues Requiring Follow-Up  Home care services to start within 48 hours. Outpatient PT to start per surgeon instructions.    Test Results Pending At Discharge  Pending Labs       No current pending labs.            Hospital Course  Patient underwent surgery for Right Knee Revision Arthroplasty without complications. Patient was then takent to the PACU in stabe condition. Patient was then transferred to the or.  Pain was appropriately controlled and weaned to oral medications. Diet was advanced as tolerated. PT/OT was consulted to begin on post operative day 0 and recommended Home for patient on discharge. Patient had uneventful hospital course. Patient is to follow-up in 3 weeks at scheduled post-op visit.      Face to Face following surgical procedure on 06/13/25. The patient was awake and alert. VSS, afebrile. Sensation intact bilaterally, sural/saph/sp/tibal n. Motor intact flexion/extension/DF/PF/EHL/FHL bilaterally. Palpable symmetric DP/PT pulse bilaterally.    Pertinent Physical Exam At Time of Discharge  Physical Exam  Vitals and nursing note reviewed.  Constitutional:       Appearance: Normal appearance.   HENT:      Head: Normocephalic and atraumatic.   Eyes:      Extraocular Movements: Extraocular movements intact.   Cardiovascular:      Pulses: Normal pulses.   Pulmonary:      Effort: Pulmonary effort is normal.   Musculoskeletal:      Operative lower extremity in neutral alignment.  Knee dressing is clean, dry, intact. Prevena with no output to date. Passive range of motion with mild discomfort, appropriate for postoperative timeframe.  Dermis is intact.  Neurovascular status is intact.  2+ dorsalis pedis pulses, capillary refill <2 seconds. Mild swelling and warmth, no erythema. Negative Homans.   Skin:     General: Skin is warm and dry.      Capillary Refill: Capillary refill takes less than 2 seconds.   Neurological:      General: No focal  deficit present.      Mental Status: Patient is alert and oriented to person, place, and time. Mental status is at baseline.   Psychiatric:         Mood and Affect: Mood normal.         Thought Content: Thought content normal.         Judgment: Judgment normal.           Home Medications  Medication List      Your medication list        START taking these medications        Instructions Last Dose Given Next Dose Due   acetaminophen 325 mg tablet  Commonly known as: Tylenol      Take 3 tablets (975 mg) by mouth every 8 hours if needed for mild pain (1 - 3).       cyclobenzaprine 5 mg tablet  Commonly known as: Flexeril      Take 1 tablet (5 mg) by mouth 3 times a day as needed for muscle spasms for up to 10 days.       doxycycline 100 mg capsule  Commonly known as: Monodox      Take 1 capsule (100 mg) by mouth 2 times a day. To prevent infection       oxyCODONE 5 mg immediate release tablet  Commonly known as: Roxicodone      Take 1 tablet (5 mg) by mouth every 6 hours if needed for severe pain (7 - 10) for up to 7 days.       pantoprazole 40 mg EC tablet  Commonly known as: ProtoNix      Take 1 tablet (40 mg) by mouth once daily as needed (heartburn). Do not crush, chew, or split.       senna 8.6 mg tablet  Generic drug: sennosides      Take 1 tablet (8.6 mg) by mouth 2 times a day. To prevent constipation       traMADol 50 mg tablet  Commonly known as: Ultram      Take 1 tablet (50 mg) by mouth every 6 hours if needed for severe pain (7 - 10) for up to 7 days.       Xarelto 10 mg tablet  Generic drug: rivaroxaban      Take 1 tablet (10 mg) by mouth once daily. To prevent blood clots              CHANGE how you take these medications        Instructions Last Dose Given Next Dose Due   fluticasone 50 mcg/actuation nasal spray  Commonly known as: Flonase  What changed:   when to take this  reasons to take this      Administer 2 sprays into each nostril once daily. Shake gently. Before first use, prime pump. After use,  clean tip and replace cap.              CONTINUE taking these medications        Instructions Last Dose Given Next Dose Due   albuterol 90 mcg/actuation inhaler  Commonly known as: ProAir HFA      Inhale 2 puffs every 4 hours if needed for wheezing.       albuterol 2.5 mg /3 mL (0.083 %) nebulizer solution      Take 3 mL (2.5 mg) by nebulization 4 times a day as needed for wheezing or shortness of breath.       albuterol 90 mcg/actuation inhaler  Commonly known as: ProAir HFA      Inhale 2 puffs every 4 hours if needed for wheezing or shortness of breath.       budesonide-formoterol 160-4.5 mcg/actuation inhaler  Commonly known as: Breyna      Inhale 2 puffs 2 times a day. Rinse mouth with water after use to reduce aftertaste and incidence of candidiasis. Do not swallow.       cetirizine 10 mg tablet  Commonly known as: ZyrTEC      Take 1 tablet (10 mg) by mouth once daily.       cholecalciferol 25 mcg (1,000 units) capsule  Commonly known as: Vitamin D-3           fluticasone propion-salmeteroL 500-50 mcg/dose diskus inhaler  Commonly known as: Wixela Inhub      Inhale 1 puff 2 times a day. Rinse mouth with water after use to reduce aftertaste and incidence of candidiasis. Do not swallow.       QUEtiapine 400 mg tablet  Commonly known as: SEROquel           QUEtiapine 25 mg tablet  Commonly known as: SEROquel                  STOP taking these medications      chlorhexidine 0.12 % solution  Commonly known as: Peridex               ASK your doctor about these medications        Instructions Last Dose Given Next Dose Due   UNABLE TO FIND                     Where to Get Your Medications        These medications were sent to [facility]  [address] [place] [id number]      Hours: 8AM to 6PM Mon-Fri, 8AM to 4PM Sat-Sun Phone: [phone]   acetaminophen 325 mg tablet  cyclobenzaprine 5 mg tablet  doxycycline 100 mg capsule  oxyCODONE 5 mg immediate release tablet  pantoprazole 40 mg EC  tablet  senna 8.6 mg tablet  traMADol 50 mg tablet  Xarelto 10 mg tablet             Outpatient Follow-Up  Patient to follow up in three weeks in the clinic  Special Instructions    Please read discharge instructions provided by your surgeon before calling with questions as this will delay care.    Medication refills-Narcotic pain medication will be refilled every 7 days per state law. Please request refills through [facility] preferably/[phone]. All medication requests may take up to 72 hours to refill and refills after Friday 1pm will be refilled on the next business day.

## 2025-06-13 NOTE — PROGRESS NOTES
Physical Therapy    Physical Therapy Treatment    Patient Name: Chloe Mclean  MRN: [id number]  Department: [facility] 3 E  Room: [id number]  Today's Date: 6/13/2025  Time Calculation  Start Time: 0825  Stop Time: 0850  Time Calculation (min): 25 min         Assessment/Plan   PT Assessment  PT Assessment Results: Decreased strength  Rehab Prognosis: Excellent  Barriers to Discharge Home: No anticipated barriers  Evaluation/Treatment Tolerance: Patient tolerated treatment well  End of Session Communication: Bedside nurse  Assessment Comment: reviewed HEP. pt had no LOB with amb. pt able to perform transfers safely. pt states she does not have to do any stairs at this time and is on one floor. reviewed home safety and answered questions.  End of Session Patient Position: Alarm on, Up in chair  PT Plan  Inpatient/Swing Bed or Outpatient: Inpatient  PT Plan  Treatment/Interventions: Transfer training, Gait training, Stair training, Strengthening, Range of motion, Therapeutic exercise, Therapeutic activity, Home exercise program  PT Plan: Ongoing PT  PT Frequency: 3 times per week  PT Discharge Recommendations: Low intensity level of continued care  Equipment Recommended upon Discharge:  (Pt has a BWW at home.)  PT Recommended Transfer Status: Assist x1  PT - OK to Discharge: Yes (Once medically cleared.)    PT Visit Info:  PT Received On: 06/13/25     General Visit Information:        Subjective   Precautions:               Objective   Pain:  Pain Assessment  0-10 (Numeric) Pain Score:  (pain in R knee not rated)  Cognition:  Cognition  Orientation Level: Oriented X4  Coordination:          Treatments:       Bed Mobility 1  Bed Mobility 1: Supine to sitting  Level of Assistance 1: Modified independent    Ambulation/Gait Training  Ambulation/Gait Training Performed: Yes  Ambulation/Gait Training 1  Device 1: Rolling walker  Assistance 1:  (SBA)  Quality of Gait 1: Decreased step length, Diminished heel  strike  Comments/Distance (ft) 1: 250  Transfer 1  Technique 1: Sit to stand, Stand to sit  Transfer Device 1: Walker  Transfer Level of Assistance 1: Close supervision    Outcome Measures:  [facility] Basic Mobility  Turning from your back to your side while in a flat bed without using bedrails: None  Moving from lying on your back to sitting on the side of a flat bed without using bedrails: None  Moving to and from bed to chair (including a wheelchair): A little  Standing up from a chair using your arms (e.g. wheelchair or bedside chair): A little  To walk in hospital room: A little  Climbing 3-5 steps with railing: Total  Basic Mobility - Total Score: 18    Education Documentation  Mobility Training, taught by Juliet Nassar PTA at 6/13/2025 10:28 AM.  Learner: Patient  Readiness: Acceptance  Method: Explanation  Response: Verbalizes Understanding    Education Comments  No comments found.        OP EDUCATION:       Encounter Problems       Encounter Problems (Active)       To improve strength, ROM, overall functional independence:        Patient will participate in R knee exercises and flexibility x 15 repetitions to increase R knee flexion during gait cycle 150 feet with BWW and SBA. (Progressing)       Start:  06/12/25    Expected End:  06/26/25            Patient will negotiate 1 step with WW and SBA.  (Progressing)       Start:  06/12/25    Expected End:  06/26/25

## 2025-06-13 NOTE — HH CARE COORDINATION
Home Care received a Referral for Physical Therapy. We have processed the referral for a Start of Care on 6/14/25.     If you have any questions or concerns, please feel free to contact us at [phone]. Follow the prompts, enter your five digit zip code, and you will be directed to your care team on [facility].

## 2025-06-13 NOTE — PROGRESS NOTES
Chloe Mclean is a 45 y.o. female on day 2 of admission presenting with Loosening of knee joint prosthesis.      Subjective   Chloe Mclean is a 45 y.o. female with past medical history s/f COPD / asthma overlap (no baseline O2), DASHA (no PAP therapy), allergic rhinitis, seasonal allergies, nicotine dependence (vaping), anxiety, depression, schizophrenia, obesity, who was admitted to the orthopedic service s/p R TKA revision (both components) 2/2 loosening of the prosthesis 6/11/25. Medicine was consulted for medical management.    6/13/2025: No acute events overnight. Vitals stable. No AM labs.   Has hospital wound VAC on right knee at this time, will transition upon discharge to home-going unit.  She is eager to be discharged home today       Patient evaluated in the presence of Pharmacist and RN    Review of Systems   Constitutional:  Negative for appetite change, chills, diaphoresis, fatigue and fever.   HENT:  Negative for congestion, ear pain, facial swelling, hearing loss, nosebleeds, sore throat, tinnitus and trouble swallowing.    Eyes:  Negative for pain.   Respiratory:  Negative for cough, chest tightness, shortness of breath and wheezing.    Cardiovascular:  Negative for chest pain, palpitations and leg swelling.   Gastrointestinal:  Positive for nausea. Negative for abdominal pain, blood in stool, constipation, diarrhea and vomiting.   Genitourinary:  Negative for dysuria, flank pain, frequency, hematuria and urgency.   Musculoskeletal:  Negative for back pain and joint swelling.        + Right knee pain   Skin:  Negative for rash and wound.   Neurological:  Negative for dizziness, syncope, weakness, light-headedness, numbness and headaches.   Hematological:  Does not bruise/bleed easily.   Psychiatric/Behavioral:  Negative for behavioral problems, hallucinations and suicidal ideas.           Objective     Last Recorded Vitals  /79 (BP Location: Right arm, Patient Position: Lying)   Pulse 78    Temp 36.2 °C (97.1 °F) (Temporal)   Resp 16   Wt 117 kg (257 lb)   SpO2 97%     Image Results  Imaging  XR knee right 1-2 views  Result Date: 6/11/2025  Revision constrained total knee arthroplasty without periprosthetic fracture or immediate hardware complication.     Signed by: Eulogio Goodwin 6/11/2025 4:21 PM Dictation workstation:   [id number]      Cardiology, Vascular, and Other Imaging  No other imaging results found for the past 7 days       Lab Results  No results found for this or any previous visit (from the past 24 hours).     Medications  Scheduled medications:  Scheduled Medications[1]  Continuous medications:  Continuous Medications[2]  PRN medications:  PRN Medications[3]     Physical Exam  Constitutional:       General: She is not in acute distress.     Appearance: Normal appearance. She is obese.      Comments: Sitting upright in bedside chair   HENT:      Head: Normocephalic and atraumatic.      Right Ear: External ear normal.      Left Ear: External ear normal.      Nose: Nose normal.      Mouth/Throat:      Mouth: Mucous membranes are moist.      Pharynx: Oropharynx is clear.   Eyes:      Extraocular Movements: Extraocular movements intact.      Conjunctiva/sclera: Conjunctivae normal.      Pupils: Pupils are equal, round, and reactive to light.   Cardiovascular:      Rate and Rhythm: Normal rate and regular rhythm.      Pulses: Normal pulses.      Heart sounds: Normal heart sounds.   Pulmonary:      Effort: Pulmonary effort is normal. No respiratory distress.      Breath sounds: Normal breath sounds. No wheezing, rhonchi or rales.   Abdominal:      General: Bowel sounds are normal.      Palpations: Abdomen is soft.      Tenderness: There is no abdominal tenderness. There is no right CVA tenderness, left CVA tenderness, guarding or rebound.   Musculoskeletal:      Cervical back: Normal range of motion and neck supple.      Comments: Post-operative dressing is C/D/I, did not remove  dressing  NVS intact distal to operative site    Skin:     General: Skin is warm and dry.      Capillary Refill: Capillary refill takes less than 2 seconds.      Findings: No lesion or rash.   Neurological:      General: No focal deficit present.      Mental Status: She is alert and oriented to person, place, and time. Mental status is at baseline.   Psychiatric:         Mood and Affect: Mood normal.         Behavior: Behavior normal.                  Assessment/Plan        R TKA loosening of prosthesis s/p R TKA revision (both components) with Dr. Rendon on 06/11/25  -Post-op course per primary  -Pain control  -Constipation ppx  -PT/OT  -Incentive Spirometry   -DVT Prophylaxis: as per primary service (Lovenox --> Xarelto 10mg daily x 1 month)  -Wound VAC  -Doxycycline prophylaxis until 7/23/2025 per orthopedics     H/o provoked VTE (post-operative), remote  -Patient is currently on Lovenox and will be discharged on Xarelto out of abundance of caution   -Continue as directed by orthopedics     COPD / asthma without acute exacerbation   -Patient without evidence of decompensation at this time   -Continued on home therapies (scheduled Breo, prn albuterol MDI and prn nebs). Encourage pulmonary hygiene while admitted.    -Continue outpatient follow up with [facility] (Dr. King) as previously done. I reviewed the last note from her visits.      DASHA  -Patient does not currently use PAP therapy   -Continue follow up as scheduled      Allergic rhinitis, seasonal allergies   -Continue formulary alternative (Zyrtec) while admitted  -Continue home flonase      Nicotine Dependence (vapes nicotine containing substances)  -Cessation education provided during hospitalization   -NRT discussed and offered --> patient declines need for this presently. She will make myself or her nurse aware if she changes her mind.      Anxiety, depression  Schizophrenia   -Patient is continued on home Seroquel nightly. She admits that she  only uses 200mg nightly instead of prescribed 400mg because the dose makes her way too tired through the day. She denies any breakthrough symptoms and has been using decreased dose for a few weeks.   -She is due to follow up with her psychiatric provider in the next few weeks as well. Counseled her to discuss these symptoms and discuss formal dose reduction to be prescribed.      Obesity (BMI 39.08)  -Patient does not meet morbid/severe criteria for obesity on admission   -Continued outpatient follow-up with PCP, healthy dietary and lifestyle changes as able      Code Status: Full Code                 DVT ppx: Per primary    Thank you for involving us in the care of this very pleasant patient. We will follow along with you while they remain admitted. Please contact Hospitalist service if any clarification needed.      Please see orders for more complete plan    Casie Julio PA-C         [1] acetaminophen, 650 mg, oral, q6h GLENROY  cetirizine, 10 mg, oral, Daily  enoxaparin, 40 mg, subcutaneous, Daily  fluticasone, 2 spray, Each Nostril, Daily  fluticasone furoate-vilanteroL, 1 puff, inhalation, Daily  pantoprazole, 40 mg, oral, Daily before breakfast  QUEtiapine, 200 mg, oral, Nightly  scopolamine, 1 patch, transdermal, q72h  sennosides, 2 tablet, oral, BID     [2] oxygen, 2 L/min, Last Rate: 2 L/min (06/11/25 3447)     [3] PRN medications: albuterol, albuterol, benzocaine-menthol, bisacodyl, cyclobenzaprine, metoclopramide **OR** metoclopramide, naloxone, ondansetron ODT **OR** ondansetron, oxyCODONE, oxyCODONE, QUEtiapine

## 2025-06-13 NOTE — NURSING NOTE
Pt being discharged, IV removed, New home wound vac put on and hospital wound vac left in room.  Instructions provided and understood.  Questions answered.  Belongings with patient.  She is waiting on ride.

## 2025-06-14 ENCOUNTER — HOME CARE VISIT (OUTPATIENT)
Dept: HOME HEALTH SERVICES | Facility: HOME HEALTH | Age: 45
End: 2025-06-14
Payer: MEDICARE

## 2025-06-14 PROCEDURE — 169592 NO-PAY CLAIM PROCEDURE

## 2025-06-14 PROCEDURE — G0152 HHCP-SERV OF OT,EA 15 MIN: HCPCS | Mod: HHH

## 2025-06-14 ASSESSMENT — ENCOUNTER SYMPTOMS
PERSON REPORTING PAIN: PATIENT
PAIN: 1
ANGER WITHIN DEFINED LIMITS: 1
PAIN LOCATION - PAIN SEVERITY: 5/10
PAIN LOCATION: RIGHT KNEE
LOWEST PAIN SEVERITY IN PAST 24 HOURS: 2/10
AGGRESSION WITHIN DEFINED LIMITS: 1
HIGHEST PAIN SEVERITY IN PAST 24 HOURS: 10/10
OCCASIONAL FEELINGS OF UNSTEADINESS: 1
LOSS OF SENSATION IN FEET: 0
DEPRESSION: 0
SUBJECTIVE PAIN PROGRESSION: GRADUALLY IMPROVING

## 2025-06-14 ASSESSMENT — ACTIVITIES OF DAILY LIVING (ADL)
OASIS_M1830: 05
ENTERING_EXITING_HOME: ONE PERSON

## 2025-06-16 ENCOUNTER — HOME CARE VISIT (OUTPATIENT)
Dept: HOME HEALTH SERVICES | Facility: HOME HEALTH | Age: 45
End: 2025-06-16
Payer: MEDICARE

## 2025-06-16 VITALS
RESPIRATION RATE: 18 BRPM | DIASTOLIC BLOOD PRESSURE: 78 MMHG | TEMPERATURE: 97.2 F | OXYGEN SATURATION: 97 % | HEART RATE: 89 BPM | SYSTOLIC BLOOD PRESSURE: 122 MMHG

## 2025-06-16 PROCEDURE — G0151 HHCP-SERV OF PT,EA 15 MIN: HCPCS | Mod: HHH

## 2025-06-16 SDOH — HEALTH STABILITY: PHYSICAL HEALTH
EXERCISE COMMENTS: INSTRUCTION AND RETURN DEMONSTRATION OF THERAPEUTIC EXERCISES INCLUDING SEATED HEEL SLIDES, SEATED KNEE EXTENSIONS, SUPINE ANKLE PUMPS, SUPINE QUAD SETS, SUPINE HEEL SLIDES WITH STRAP, SUPINE SQA 1 SET 10-20 REPS.  PT REQUIRED VERBAL AND VISUAL CUEIN

## 2025-06-16 SDOH — ECONOMIC STABILITY: HOUSING INSECURITY: HOME SAFETY: PT HAS TWO STEPS TO ENTER AND EXIT HER HOME THAT ARE LARGE ENOUGH FOR HER TO USE HER FWW TO NEGOTIATE.

## 2025-06-16 SDOH — HEALTH STABILITY: PHYSICAL HEALTH: EXERCISE TYPE: TKA PROTOCOL EXERCISE PROGRAM

## 2025-06-16 SDOH — HEALTH STABILITY: PHYSICAL HEALTH: EXERCISE COMMENTS: G FOR PROPER EXECUTION OF EXERCISES.  PT WAS PROVIDED WRITTEN HOME EXERCISE HANDOUTS.

## 2025-06-16 ASSESSMENT — ENCOUNTER SYMPTOMS
PAIN LOCATION: RIGHT LEG
HIGHEST PAIN SEVERITY IN PAST 24 HOURS: 8/10
PAIN SEVERITY GOAL: 0/10
PAIN LOCATION - PAIN QUALITY: SHARP
PAIN: 1
PAIN LOCATION: RIGHT KNEE
OCCASIONAL FEELINGS OF UNSTEADINESS: 1
LOWEST PAIN SEVERITY IN PAST 24 HOURS: 2/10
SUBJECTIVE PAIN PROGRESSION: UNCHANGED
PERSON REPORTING PAIN: PATIENT
MUSCLE WEAKNESS: 1
PAIN LOCATION - PAIN FREQUENCY: CONSTANT
PAIN LOCATION - PAIN FREQUENCY: CONSTANT
PAIN LOCATION - PAIN QUALITY: BURNING
LIMITED RANGE OF MOTION: 1

## 2025-06-16 ASSESSMENT — PAIN SCALES - PAIN ASSESSMENT IN ADVANCED DEMENTIA (PAINAD)
BODYLANGUAGE: 0 - RELAXED.
BODYLANGUAGE: 0
FACIALEXPRESSION: 0 - SMILING OR INEXPRESSIVE.
TOTALSCORE: 0
BREATHING: 0
NEGVOCALIZATION: 0
CONSOLABILITY: 0
NEGVOCALIZATION: 0 - NONE.
FACIALEXPRESSION: 0
CONSOLABILITY: 0 - NO NEED TO CONSOLE.

## 2025-06-16 ASSESSMENT — ACTIVITIES OF DAILY LIVING (ADL)
CURRENT_FUNCTION: STAND BY ASSIST
AMBULATION ASSISTANCE ON FLAT SURFACES: 1
AMBULATION_DISTANCE/DURATION_TOLERATED: 30 FT X 2
AMBULATION ASSISTANCE: STAND BY ASSIST

## 2025-06-18 ENCOUNTER — HOME CARE VISIT (OUTPATIENT)
Dept: HOME HEALTH SERVICES | Facility: HOME HEALTH | Age: 45
End: 2025-06-18
Payer: MEDICARE

## 2025-06-18 VITALS
RESPIRATION RATE: 16 BRPM | HEART RATE: 90 BPM | OXYGEN SATURATION: 97 % | SYSTOLIC BLOOD PRESSURE: 112 MMHG | DIASTOLIC BLOOD PRESSURE: 82 MMHG | TEMPERATURE: 97.2 F

## 2025-06-18 PROCEDURE — G0151 HHCP-SERV OF PT,EA 15 MIN: HCPCS | Mod: HHH

## 2025-06-18 SDOH — HEALTH STABILITY: PHYSICAL HEALTH
EXERCISE COMMENTS: INSTRUCTION AND RETURN DEMONSTRATION OF THERAPEUTIC EXERCISES INCLUDING SEATED PEDALLING WARM-UP MAKING 3/4 REVOLUTIONS X 3:00 MINS, SEATED HEEL SLIDES, SEATED KNEE EXTENSIONS, SEATED HEEL AND TOE RAISES, SUPINE ANKLE PUMPS, SUPINE QUAD AND GLUT SETS

## 2025-06-18 SDOH — HEALTH STABILITY: PHYSICAL HEALTH
EXERCISE COMMENTS: , SUPINE HEEL SLIDES WITH STRAP, SUPINE SQA 1 SET 10-20 REPS.  PT REQUIRED VERBAL AND VISUAL CUEING FOR PROPER EXECUTION OF EXERCISES.  PT WAS PROVIDED WRITTEN HOME EXERCISE HANDOUTS.

## 2025-06-18 SDOH — HEALTH STABILITY: PHYSICAL HEALTH: EXERCISE TYPE: TKA PROTOCOL EXERCISE PROGRAM

## 2025-06-18 ASSESSMENT — ENCOUNTER SYMPTOMS
LOWEST PAIN SEVERITY IN PAST 24 HOURS: 2/10
PAIN LOCATION: RIGHT KNEE
PAIN SEVERITY GOAL: 0/10
NAUSEA: 1
SUBJECTIVE PAIN PROGRESSION: UNCHANGED
HIGHEST PAIN SEVERITY IN PAST 24 HOURS: 8/10
PAIN: 1
PERSON REPORTING PAIN: PATIENT

## 2025-06-18 ASSESSMENT — PAIN SCALES - PAIN ASSESSMENT IN ADVANCED DEMENTIA (PAINAD)
NEGVOCALIZATION: 0 - NONE.
TOTALSCORE: 0
BREATHING: 0
BODYLANGUAGE: 0 - RELAXED.
FACIALEXPRESSION: 0
NEGVOCALIZATION: 0
FACIALEXPRESSION: 0 - SMILING OR INEXPRESSIVE.
BODYLANGUAGE: 0
CONSOLABILITY: 0 - NO NEED TO CONSOLE.
CONSOLABILITY: 0

## 2025-06-18 ASSESSMENT — ACTIVITIES OF DAILY LIVING (ADL)
AMBULATION ASSISTANCE ON FLAT SURFACES: 1
AMBULATION_DISTANCE/DURATION_TOLERATED: 80 FT X 1

## 2025-06-20 ENCOUNTER — TELEPHONE (OUTPATIENT)
Dept: ORTHOPEDIC SURGERY | Facility: HOSPITAL | Age: 45
End: 2025-06-20

## 2025-06-20 DIAGNOSIS — T84.038A LOOSENING OF KNEE JOINT PROSTHESIS, INITIAL ENCOUNTER: ICD-10-CM

## 2025-06-20 DIAGNOSIS — Z96.659 LOOSENING OF KNEE JOINT PROSTHESIS, INITIAL ENCOUNTER: ICD-10-CM

## 2025-06-20 RX ORDER — OXYCODONE HYDROCHLORIDE 5 MG/1
5 TABLET ORAL EVERY 6 HOURS PRN
Qty: 28 TABLET | Refills: 0 | Status: SHIPPED | OUTPATIENT
Start: 2025-06-20 | End: 2025-06-27

## 2025-06-20 NOTE — TELEPHONE ENCOUNTER
Patient called in requesting a refill of oxycodone, states she has 1 pill left. RIGHT KNEE REVISION ARTHROPLASTY BOTH COMPONENTS surgery 6/11/25. Please advise    Pharmacy: [facility] in [place]

## 2025-06-23 ENCOUNTER — HOME CARE VISIT (OUTPATIENT)
Dept: HOME HEALTH SERVICES | Facility: HOME HEALTH | Age: 45
End: 2025-06-23
Payer: MEDICARE

## 2025-06-23 VITALS
TEMPERATURE: 96.6 F | DIASTOLIC BLOOD PRESSURE: 76 MMHG | RESPIRATION RATE: 16 BRPM | SYSTOLIC BLOOD PRESSURE: 120 MMHG | HEART RATE: 74 BPM | OXYGEN SATURATION: 98 %

## 2025-06-23 PROCEDURE — G0151 HHCP-SERV OF PT,EA 15 MIN: HCPCS | Mod: HHH

## 2025-06-23 SDOH — HEALTH STABILITY: PHYSICAL HEALTH: EXERCISE TYPE: TKA PROTOCOL EXERCISE PROGRAM

## 2025-06-23 SDOH — HEALTH STABILITY: PHYSICAL HEALTH
EXERCISE COMMENTS: SQUEEZE HIP ADDUCTION, SUPINE ANKLE PUMPS, SUPINE QUAD AND GLUT SETS, SUPINE HEEL SLIDES WITH STRAP, SUPINE SQA, SUPINE SLR, STANDING HEEL AND TOE RAISES, STANDING KNEE CURLS 1 SET 10-20 REPS.  PT REQUIRED VERBAL AND VISUAL CUEING FOR PROPER EXECUTI

## 2025-06-23 SDOH — HEALTH STABILITY: PHYSICAL HEALTH: EXERCISE COMMENTS: ON OF EXERCISES.  PT WAS PROVIDED WRITTEN HOME EXERCISE HANDOUTS.

## 2025-06-23 SDOH — HEALTH STABILITY: PHYSICAL HEALTH
EXERCISE COMMENTS: INSTRUCTION AND RETURN DEMONSTRATION OF THERAPEUTIC EXERCISES INCLUDING SEATED PEDALLING WARM-UP X 4:00 MINS, SEATED HEEL SLIDES, SEATED KNEE EXTENSIONS, SEATED HEEL AND TOE RAISES, SEATED YELLOW THERABAND KNEE FLEXION AND HIP ABDUCTION, SEATED BALL

## 2025-06-23 ASSESSMENT — PAIN SCALES - PAIN ASSESSMENT IN ADVANCED DEMENTIA (PAINAD)
BREATHING: 0
TOTALSCORE: 0
FACIALEXPRESSION: 0
CONSOLABILITY: 0
BODYLANGUAGE: 0 - RELAXED.
NEGVOCALIZATION: 0
CONSOLABILITY: 0 - NO NEED TO CONSOLE.
FACIALEXPRESSION: 0 - SMILING OR INEXPRESSIVE.
BODYLANGUAGE: 0
NEGVOCALIZATION: 0 - NONE.

## 2025-06-23 ASSESSMENT — ENCOUNTER SYMPTOMS
PAIN LOCATION: RIGHT KNEE
PERSON REPORTING PAIN: PATIENT
PAIN SEVERITY GOAL: 2/10
HIGHEST PAIN SEVERITY IN PAST 24 HOURS: 7/10
PAIN: 1
LOWEST PAIN SEVERITY IN PAST 24 HOURS: 2/10
SUBJECTIVE PAIN PROGRESSION: GRADUALLY IMPROVING

## 2025-06-23 ASSESSMENT — ACTIVITIES OF DAILY LIVING (ADL)
AMBULATION ASSISTANCE ON FLAT SURFACES: 1
AMBULATION_DISTANCE/DURATION_TOLERATED: 80 FT X 1

## 2025-06-26 ENCOUNTER — HOME CARE VISIT (OUTPATIENT)
Dept: HOME HEALTH SERVICES | Facility: HOME HEALTH | Age: 45
End: 2025-06-26
Payer: MEDICARE

## 2025-06-26 VITALS
OXYGEN SATURATION: 95 % | DIASTOLIC BLOOD PRESSURE: 76 MMHG | HEART RATE: 108 BPM | RESPIRATION RATE: 16 BRPM | SYSTOLIC BLOOD PRESSURE: 108 MMHG | TEMPERATURE: 96.9 F

## 2025-06-26 PROCEDURE — G0151 HHCP-SERV OF PT,EA 15 MIN: HCPCS | Mod: HHH

## 2025-06-26 SDOH — HEALTH STABILITY: PHYSICAL HEALTH
EXERCISE COMMENTS: TION, SEATED BALL SQUEEZE HIP ADDUCTION, SUPINE ANKLE PUMPS, SUPINE QUAD AND GLUT SETS, SUPINE HEEL SLIDES WITH STRAP, SUPINE SQA, SUPINE SLR, STANDING HEEL AND TOE RAISES, STANDING KNEE CURLS 2 SETS 10 REPS.  PT REQUIRED VERBAL AND VISUAL CUEING FOR

## 2025-06-26 SDOH — HEALTH STABILITY: PHYSICAL HEALTH
EXERCISE COMMENTS: INSTRUCTION AND RETURN DEMONSTRATION OF THERAPEUTIC EXERCISES INCLUDING SEATED PEDALLING WARM-UP X 4:30 MINS, SEATED HEEL SLIDES, SEATED KNEE EXTENSIONS, SEATED HEEL AND TOE RAISES, SEATED MARCHING,  SEATED YELLOW THERABAND KNEE FLEXION AND HIP ABDUC

## 2025-06-26 SDOH — HEALTH STABILITY: PHYSICAL HEALTH: EXERCISE COMMENTS: PROPER EXECUTION OF EXERCISES.  PT WAS PROVIDED WRITTEN HOME EXERCISE HANDOUTS.

## 2025-06-26 SDOH — HEALTH STABILITY: PHYSICAL HEALTH: EXERCISE TYPE: TKA PROTOCOL EXERCISE PROGRAM.

## 2025-06-26 ASSESSMENT — ENCOUNTER SYMPTOMS
PAIN SEVERITY GOAL: 0/10
SUBJECTIVE PAIN PROGRESSION: GRADUALLY IMPROVING
HIGHEST PAIN SEVERITY IN PAST 24 HOURS: 8/10
PERSON REPORTING PAIN: PATIENT
PAIN LOCATION: RIGHT ANKLE
LOWEST PAIN SEVERITY IN PAST 24 HOURS: 1/10
PAIN: 1
PAIN LOCATION: RIGHT KNEE
PAIN LOCATION - PAIN QUALITY: DULL ACHE, SHARP
PAIN LOCATION - PAIN QUALITY: TIGHTNESS

## 2025-06-26 ASSESSMENT — PAIN SCALES - PAIN ASSESSMENT IN ADVANCED DEMENTIA (PAINAD)
CONSOLABILITY: 0
NEGVOCALIZATION: 0 - NONE.
FACIALEXPRESSION: 0
BODYLANGUAGE: 0
BODYLANGUAGE: 0 - RELAXED.
NEGVOCALIZATION: 0
TOTALSCORE: 0
BREATHING: 0
FACIALEXPRESSION: 0 - SMILING OR INEXPRESSIVE.
CONSOLABILITY: 0 - NO NEED TO CONSOLE.

## 2025-06-26 ASSESSMENT — ACTIVITIES OF DAILY LIVING (ADL)
AMBULATION ASSISTANCE ON FLAT SURFACES: 1
AMBULATION_DISTANCE/DURATION_TOLERATED: 100 FT X 1

## 2025-06-27 ENCOUNTER — TELEPHONE (OUTPATIENT)
Dept: ORTHOPEDIC SURGERY | Facility: HOSPITAL | Age: 45
End: 2025-06-27
Payer: MEDICARE

## 2025-06-27 DIAGNOSIS — Z96.659 LOOSENING OF KNEE JOINT PROSTHESIS, INITIAL ENCOUNTER: Primary | ICD-10-CM

## 2025-06-27 DIAGNOSIS — T84.038A LOOSENING OF KNEE JOINT PROSTHESIS, INITIAL ENCOUNTER: Primary | ICD-10-CM

## 2025-06-27 RX ORDER — OXYCODONE HYDROCHLORIDE 5 MG/1
5 TABLET ORAL EVERY 6 HOURS PRN
Qty: 28 TABLET | Refills: 0 | Status: SHIPPED | OUTPATIENT
Start: 2025-06-27 | End: 2025-07-04

## 2025-06-27 NOTE — TELEPHONE ENCOUNTER
RIGHT KNEE REVISION ARTHROPLASTY BOTH COMPONENTS DOS 6/11/2025     Patient called in requesting a refill of her oxycodone she states she has 4 pills remaining would like them sent to [facility] in [place].

## 2025-06-30 ENCOUNTER — HOME CARE VISIT (OUTPATIENT)
Dept: HOME HEALTH SERVICES | Facility: HOME HEALTH | Age: 45
End: 2025-06-30
Payer: MEDICARE

## 2025-06-30 VITALS
DIASTOLIC BLOOD PRESSURE: 70 MMHG | SYSTOLIC BLOOD PRESSURE: 116 MMHG | RESPIRATION RATE: 16 BRPM | TEMPERATURE: 96.8 F | OXYGEN SATURATION: 98 % | HEART RATE: 94 BPM

## 2025-06-30 PROCEDURE — G0151 HHCP-SERV OF PT,EA 15 MIN: HCPCS | Mod: HHH

## 2025-06-30 SDOH — HEALTH STABILITY: PHYSICAL HEALTH: EXERCISE TYPE: TKA PROTOCOL EXERCISE PROGRAM

## 2025-06-30 SDOH — HEALTH STABILITY: PHYSICAL HEALTH
EXERCISE COMMENTS: REPS.  PT REQUIRED VERBAL AND VISUAL CUEING FOR PROPER EXECUTION OF EXERCISES.  PT WAS PROVIDED WRITTEN HOME EXERCISE HANDOUTS.

## 2025-06-30 SDOH — HEALTH STABILITY: PHYSICAL HEALTH
EXERCISE COMMENTS: EXION AND HIP ABDUCTION, SEATED BALL SQUEEZE HIP ADDUCTION, SUPINE ANKLE PUMPS, SUPINE QUAD AND GLUT SETS, SUPINE HEEL SLIDES WITH STRAP, SUPINE 1 1/2 LB SQA, SUPINE SLR, STANDING HEEL AND TOE RAISES, STANDING KNEE CURLS, STANDING MARCHING 2 SETS 10

## 2025-06-30 SDOH — HEALTH STABILITY: PHYSICAL HEALTH
EXERCISE COMMENTS: INSTRUCTION AND RETURN DEMONSTRATION OF THERAPEUTIC EXERCISES INCLUDING SEATED PEDALLING WARM-UP X 4:30 MINS, SEATED HEEL SLIDES, SEATED 1 1/2 LB KNEE EXTENSIONS, SEATED HEEL AND TOE RAISES, SEATED 1 1/2 LB MARCHING,  SEATED YELLOW THERABAND KNEE FL

## 2025-06-30 ASSESSMENT — PAIN SCALES - PAIN ASSESSMENT IN ADVANCED DEMENTIA (PAINAD)
NEGVOCALIZATION: 0
FACIALEXPRESSION: 0
FACIALEXPRESSION: 0 - SMILING OR INEXPRESSIVE.
CONSOLABILITY: 0
BODYLANGUAGE: 0 - RELAXED.
BODYLANGUAGE: 0
TOTALSCORE: 0
NEGVOCALIZATION: 0 - NONE.
BREATHING: 0
CONSOLABILITY: 0 - NO NEED TO CONSOLE.

## 2025-06-30 ASSESSMENT — ACTIVITIES OF DAILY LIVING (ADL)
AMBULATION ASSISTANCE ON FLAT SURFACES: 1
AMBULATION_DISTANCE/DURATION_TOLERATED: 125 FT X 1

## 2025-06-30 ASSESSMENT — ENCOUNTER SYMPTOMS
LOWEST PAIN SEVERITY IN PAST 24 HOURS: 0/10
PAIN SEVERITY GOAL: 0/10
PAIN LOCATION: RIGHT KNEE
PAIN LOCATION - RELIEVING FACTORS: REST, POSITIONING
SUBJECTIVE PAIN PROGRESSION: GRADUALLY IMPROVING
PERSON REPORTING PAIN: PATIENT
HIGHEST PAIN SEVERITY IN PAST 24 HOURS: 7/10
PAIN: 1
PAIN LOCATION - PAIN FREQUENCY: INTERMITTENT

## 2025-07-01 ENCOUNTER — APPOINTMENT (OUTPATIENT)
Dept: ORTHOPEDIC SURGERY | Facility: CLINIC | Age: 45
End: 2025-07-01
Payer: MEDICARE

## 2025-07-01 ENCOUNTER — HOSPITAL ENCOUNTER (OUTPATIENT)
Dept: RADIOLOGY | Facility: CLINIC | Age: 45
Discharge: HOME | End: 2025-07-01
Payer: MEDICARE

## 2025-07-01 VITALS — BODY MASS INDEX: 37.33 KG/M2 | WEIGHT: 252 LBS | HEIGHT: 69 IN

## 2025-07-01 DIAGNOSIS — Z96.659 ASEPTIC LOOSENING OF PROSTHETIC KNEE, INITIAL ENCOUNTER: ICD-10-CM

## 2025-07-01 DIAGNOSIS — Z96.659 LOOSENING OF KNEE JOINT PROSTHESIS, INITIAL ENCOUNTER: ICD-10-CM

## 2025-07-01 DIAGNOSIS — T84.038A LOOSENING OF KNEE JOINT PROSTHESIS, INITIAL ENCOUNTER: ICD-10-CM

## 2025-07-01 DIAGNOSIS — T84.038A ASEPTIC LOOSENING OF PROSTHETIC KNEE, INITIAL ENCOUNTER: ICD-10-CM

## 2025-07-01 PROCEDURE — 3008F BODY MASS INDEX DOCD: CPT | Performed by: ORTHOPAEDIC SURGERY

## 2025-07-01 PROCEDURE — 73560 X-RAY EXAM OF KNEE 1 OR 2: CPT | Mod: RIGHT SIDE | Performed by: RADIOLOGY

## 2025-07-01 PROCEDURE — 99024 POSTOP FOLLOW-UP VISIT: CPT | Performed by: ORTHOPAEDIC SURGERY

## 2025-07-01 PROCEDURE — 73560 X-RAY EXAM OF KNEE 1 OR 2: CPT | Mod: RT

## 2025-07-01 PROCEDURE — 1036F TOBACCO NON-USER: CPT | Performed by: ORTHOPAEDIC SURGERY

## 2025-07-01 RX ORDER — OXYCODONE HYDROCHLORIDE 5 MG/1
5 TABLET ORAL EVERY 6 HOURS PRN
Qty: 28 TABLET | Refills: 0 | Status: SHIPPED | OUTPATIENT
Start: 2025-07-01 | End: 2025-07-08

## 2025-07-01 ASSESSMENT — PAIN SCALES - GENERAL: PAINLEVEL_OUTOF10: 5 - MODERATE PAIN

## 2025-07-01 ASSESSMENT — PAIN - FUNCTIONAL ASSESSMENT: PAIN_FUNCTIONAL_ASSESSMENT: 0-10

## 2025-07-01 NOTE — PROGRESS NOTES
POST-OPERATIVE FOLLOW UP      ============================  IMPRESSION/PLAN:  ============================  45 y.o. female s/p Aseptic loosening of prosthetic knee right sidecompleted on 6/11/2025    PLAN:  -Weightbearing: Weight bearing instructions not necessary at this time  -DVT Prophylaxis: Finished course of Xarelto  -Radiology Studies: Reviewed today  -Therapies: Okay to transition outpatient therapy  -Transition off assistive device as seen fit by patient and therapy  -Follow-up: 7 weeks with x-rays        Chloe A Vinay presents today for follow up of the above operation on the right knee. Pain controlled with current treatment plan. Patient has not begun physical therapy. They not are currently doing therapy. They are currently ambulating with Walker. Overall, they are doing well. They have no concerns. XR done today. Patient has no concerns or complaints.     Review of Systems:   Constitutional: See HPI for pain assessment, No significant weight loss, recent trauma. Denies fevers/chills  Cardiovascular: No chest pain, shortness of breath  Respiratory: No difficulty breathing, cough  Gastrointestinal: No nausea, vomiting, diarrhea, constipation  Musculoskeletal: Noted in HPI, no arthralgias   Integumentary: No rashes, easy bruising, redness   Neurological: no numbness or tingling in extremities, no gait disturbances     Problem List[1]    =================================  EXAM  =================================  GENERAL: A/Ox3, NAD. Appears healthy, well nourished  CARDIAC: regular rate  LUNGS: Breathing non-labored    MUSCULOSKELETAL:  Laterality: right knee  - Incision: No overlying, erythema, induration, or drainage. Incision healing well with no wound dehiscence  - ROM: 5 to 100 degrees  - Palpation: appropriate post operative TTP along surgical incision  - compartments soft, negative homans  - can active straight leg raise with no extensor lag    NEUROVASCULAR:  - Neurovascular Status: sensation  intact to light touch distally  - Capillary refill brisk at extremities, Bilateral dorsalis pedis pulse 2+     ** Dragon software was used to dictate this note. Please be aware that minor errors in the transcription may be present **    Ramya Rendon,   Attending Surgeon  Joint Replacement and Adult Reconstructive Surgery  [place], [place]          [1]   Patient Active Problem List  Diagnosis    Bipolar depression (Multi)    Closed displaced fracture of neck of right fifth metacarpal bone    Acetabulum fracture, left    Fracture, hip (Multi)    Hemoptysis    Systemic lupus erythematosus, unspecified    MSSA (methicillin susceptible Staphylococcus aureus) infection    Pelvic abscess in female    Right knee pain    Seborrheic dermatitis of scalp    Urinary retention    Weight gain    Wound infection after surgery    Medicare annual wellness visit, subsequent    Hyperglycemia    Anemia    Exertional shortness of breath    Vaping nicotine dependence, tobacco product    Advance directive discussed with patient    Abnormal computerized axial tomography of chest    Allergic rhinitis    HERBERT positive    Aspiration pneumonia (Multi)    Asthma    Carpal tunnel syndrome of right wrist    Chronic schizophrenia (Multi)    Closed fracture of posterior wall of acetabulum (Multi)    Closed traumatic dislocation of hip (Multi)    Colitis    Facial cellulitis    Fibromyalgia    Insomnia due to medical condition    Lung mass    Moderate episode of recurrent major depressive disorder    Obesity with body mass index 30 or greater    Obstructive sleep apnea syndrome    Primary osteoarthritis of right knee    Arthropathic psoriasis, unspecified (Multi)    Pulmonary collapse    Rectal hemorrhage    Trichomonas vaginitis    Urinary tract infectious disease    Fracture of acetabulum    Fracture of bone of hip (Multi)    Acute bronchitis    Folliculitis    Acute dermatitis    Closed fracture of neck of fifth metacarpal  bone    Vitamin D deficiency    Lupus erythematosus    Methicillin susceptible Staphylococcus aureus infection    Abscess of female pelvis    Rash and other nonspecific skin eruption    Arthralgia of hip    Knee pain    Seborrhea capitis    Tobacco dependence due to cigarettes    Retention of urine    Local infection of wound    Postoperative wound infection    Wound dehiscence    History of osteomyelitis    PONV (postoperative nausea and vomiting)    Chronic obstructive pulmonary disease, unspecified    Wheezing    Encounter for screening for HIV    Need for hepatitis C screening test    Loosening of knee joint prosthesis    Loosening of knee joint prosthesis, initial encounter

## 2025-07-02 ENCOUNTER — HOME CARE VISIT (OUTPATIENT)
Dept: HOME HEALTH SERVICES | Facility: HOME HEALTH | Age: 45
End: 2025-07-02
Payer: MEDICARE

## 2025-07-02 VITALS
RESPIRATION RATE: 16 BRPM | OXYGEN SATURATION: 98 % | TEMPERATURE: 96.9 F | HEART RATE: 102 BPM | DIASTOLIC BLOOD PRESSURE: 74 MMHG | SYSTOLIC BLOOD PRESSURE: 106 MMHG

## 2025-07-02 PROCEDURE — G0151 HHCP-SERV OF PT,EA 15 MIN: HCPCS | Mod: HHH

## 2025-07-02 SDOH — HEALTH STABILITY: PHYSICAL HEALTH: EXERCISE TYPE: TKA PROTOCOL EXERCISE PROGRAM

## 2025-07-02 SDOH — HEALTH STABILITY: PHYSICAL HEALTH
EXERCISE COMMENTS: INSTRUCTION AND RETURN DEMONSTRATION OF THERAPEUTIC EXERCISES INCLUDING SEATED PEDALLING WARM-UP X 5:00 MINS, SEATED HEEL SLIDES, SEATED 1 1/2 LB KNEE EXTENSIONS, SEATED HEEL AND TOE RAISES, SEATED 1 1/2 LB MARCHING,  SEATED YELLOW THERABAND KNEE FL

## 2025-07-02 ASSESSMENT — PAIN SCALES - PAIN ASSESSMENT IN ADVANCED DEMENTIA (PAINAD)
NEGVOCALIZATION: 0 - NONE.
FACIALEXPRESSION: 0
FACIALEXPRESSION: 0 - SMILING OR INEXPRESSIVE.
BODYLANGUAGE: 0
CONSOLABILITY: 0
CONSOLABILITY: 0 - NO NEED TO CONSOLE.
NEGVOCALIZATION: 0
BREATHING: 0
TOTALSCORE: 0
BODYLANGUAGE: 0 - RELAXED.

## 2025-07-02 ASSESSMENT — ENCOUNTER SYMPTOMS
PAIN LOCATION - PAIN FREQUENCY: CONSTANT
MUSCLE WEAKNESS: 1
PAIN LOCATION - EXACERBATING FACTORS: WALKING, STANDING
PAIN LOCATION: RIGHT KNEE
PAIN: 1
PERSON REPORTING PAIN: PATIENT
HIGHEST PAIN SEVERITY IN PAST 24 HOURS: 8/10
SUBJECTIVE PAIN PROGRESSION: GRADUALLY IMPROVING
LIMITED RANGE OF MOTION: 1
PAIN LOCATION - PAIN QUALITY: SHARP
OCCASIONAL FEELINGS OF UNSTEADINESS: 0
LOWEST PAIN SEVERITY IN PAST 24 HOURS: 1/10
PAIN SEVERITY GOAL: 0/10

## 2025-07-02 ASSESSMENT — ACTIVITIES OF DAILY LIVING (ADL)
OASIS_M1830: 01
HOME_HEALTH_OASIS: 00
AMBULATION_DISTANCE/DURATION_TOLERATED: 150 FT X 1
AMBULATION ASSISTANCE ON FLAT SURFACES: 1

## 2025-07-11 ENCOUNTER — EVALUATION (OUTPATIENT)
Dept: PHYSICAL THERAPY | Facility: HOSPITAL | Age: 45
End: 2025-07-11
Payer: MEDICARE

## 2025-07-11 DIAGNOSIS — M25.661 IMPAIRED RANGE OF MOTION OF RIGHT KNEE: Primary | ICD-10-CM

## 2025-07-11 DIAGNOSIS — Z96.659 ASEPTIC LOOSENING OF PROSTHETIC KNEE, SUBSEQUENT ENCOUNTER: ICD-10-CM

## 2025-07-11 DIAGNOSIS — T84.038D ASEPTIC LOOSENING OF PROSTHETIC KNEE, SUBSEQUENT ENCOUNTER: ICD-10-CM

## 2025-07-11 PROCEDURE — 97161 PT EVAL LOW COMPLEX 20 MIN: CPT | Mod: GP

## 2025-07-11 PROCEDURE — 97110 THERAPEUTIC EXERCISES: CPT | Mod: GP

## 2025-07-11 ASSESSMENT — PAIN - FUNCTIONAL ASSESSMENT: PAIN_FUNCTIONAL_ASSESSMENT: 0-10

## 2025-07-11 ASSESSMENT — PAIN SCALES - GENERAL: PAINLEVEL_OUTOF10: 5 - MODERATE PAIN

## 2025-07-11 ASSESSMENT — PAIN DESCRIPTION - DESCRIPTORS: DESCRIPTORS: SHARP

## 2025-07-11 ASSESSMENT — ENCOUNTER SYMPTOMS
DEPRESSION: 1
OCCASIONAL FEELINGS OF UNSTEADINESS: 1
LOSS OF SENSATION IN FEET: 0

## 2025-07-11 NOTE — PROGRESS NOTES
Physical Therapy    Physical Therapy Evaluation    Patient Name: Chloe Mclean  MRN: [id number]  : 1980  Referring Physician: Ramya Rendon  Today's Date: 2025  Time Calculation  Start Time: 1349  Stop Time: 1422  Time Calculation (min): 33 min  PT Evaluation Time Entry  PT Evaluation (Low) Time Entry: 25  PT Therapeutic Procedures Time Entry  Therapeutic Exercise Time Entry: 8  Auth Visit Dates: 25-26  Visit #1    General  Reason for Referral: R TKA Revision 25  Referred By: Ramya Rendon DO    Current Problem  Problem List Items Addressed This Visit           ICD-10-CM    Aseptic loosening of prosthetic knee, subsequent encounter T84.038D, Z96.659    Relevant Orders    Follow Up In Physical Therapy    Impaired range of motion of right knee - Primary M25.661    Relevant Orders    Follow Up In Physical Therapy          SUBJECTIVE  Subjective   Pt's name and  were confirmed this date. Pt is a 45 year old F reporting to initial physical therapy evaluation for R knee TKA revision 25 after R TKA 12-13 years ago. Pt states she fell ~2-3 years ago resulting in a L pelvic fx that resulted in infection and gait impairment that pt believes was the cause for the misplacement of her R knee hardware. HHPT 25-25. Pt is able to recall interventions on HEP issued by PT and reports compliance with interventions daily. Notes she continues to ice R knee daily. Followed up with Dr. Rendon 25 without concerns with good healing. Pt follows up again on 25. Pt is ambulating without device this date. Pt states HHPT recommended her to utilize SPC but pt states she is not coordinated enough and forgot FWW. Pt reports she feels RLE is wobbly and needs strengthened. S/p R knee is leading to difficulty with ambulation, stair negotiation, and general mobility.  Patient states that their goal is to strengthen the R knee with physical therapy intervention. Prior level of function: Pt was  "ambulating IND without device with difficulty with squatting and general mobility due to weakness, pain, and being \"bow-legged\". Was not previously ambulating with an assistive device.     Ambulatory Screenings Summary  Annually for patients age 55 and older and patients with life-threatening illness and more frequently at the discretion of the provider when there has been a change in patient condition/clinical indication.   Screening  Date Last Completed   Advance Directives 4/17/2025      Annually during a primary care office visit and at the discretion of the provider when there has been a change in patient condition/clinical indication.  Depression Screening 6/11/2025   Suicide Risk Screening 6/11/2025      Annually during any provider office visit and at the discretion of the provider when there has been a change in patient condition/clinical indication.  Alcohol Screening 6/11/2025 10:44 AM   Substance Use Screening 6/11/2025 10:44 AM    Tobacco 7/1/2025 12:10 PM      Completed at the discretion of the provider during any provider office visit or when there has been a change in patient condition/clinical indication.  Domestic Violence 4/17/2025   Human Trafficking     Spiritual/Cultural  6/11/2025   Food Insecurity Screening Social Determinants of Health  6/11/2025   Patient Education/Key Learner 6/11/2025   Pain Screening 7/13/2023      Completed per Adult Falls Prevention (Outpatient), CP-119  Falls Risk Screening 7/11/2025  1:59 PM     Precautions  Precautions  STEADI Fall Risk Score (The score of 4 or more indicates an increased risk of falling): 8  Medical Precautions:  (Asthma, Emphysema/COPD, Lung Disease, Osteoporosis, OA, Psoriatic Arthritis, Lupus, Hx of L pelvic fx, R TKA revision 6/11/25, L hip replacement ~1 year ago, schizophrenia)    Pain  Pain Assessment: 0-10  0-10 (Numeric) Pain Score: 5 - Moderate pain  Pain Location: Knee  Pain Orientation: Right  Pain Descriptors: Sharp  Pain Frequency: " Constant/continuous  Clinical Progression: Gradually improving    OBJECTIVE:  Lower Extremity Strength:  LE strength not listed below is WNL  MMT 5/5 max  LEFT RIGHT   Hip Flexion 4 4   Hip Extension 5 5   Hip Abduction     Hip Adduction 5 5   Knee Extension 5 4   Knee Flexion 5 4   Ankle DF 5 5   Ankle PF 5 5     KNEE AROM:    LEFT RIGHT   Knee Flexion 135 degrees 108 degrees   Knee Extension 0 degrees 0 degrees     Joint mobility Grade 1-3 R patellar mobs superior<> inferior, medial <> lateral, anterior<> posterior tipping with reduced mobility compared to LLE. Pt reports some discomfort superior<>inferior movement    Gait mechanics: Ambulates IND without assistive device. R antalgic gait pattern.    Palpation TTP medial aspect of R patella inferior > superior.     Incision: Unremarkable without abnormal redness, swelling, temperature, or exudate.     Single Leg Raise: x10 RLE without extensor lag present. Initial compensation at R glute with quad set. Verbal cueing to reduce compensatory strategy at glute with pt able to correct and perform without extensor lag. Pt reports noticing a difference in difficulty of activity with appropriate body mechanics.    Sit to Stand: Transitions sit<> stand onto chair/mat with kaylie UE support without postural instability or LOB with good control throughout transition    Outcome Measure:  Other Measures  Lower Extremity Funtional Score (LEFS): 22       TREATMENT: HEP performed in session and issued for home. See below.   EXERCISES       Date              VISIT # # # # #    REPS REPS REPS REPS          Nustep              Shuttle  DLP       Shuttle SLP       Shuttle HR              Qhip Flexion       Qhip Abduction       Qhip Extension       Qhip  TKE              Q Quad       Q Hamstring               Step ups   FW/lateral       Eccentric step downs  FW/lateral       Lunges       Squat to chair              RB stretch       HS stretch              PROM       Manual                HEP         HEP  Access Code: [email or web address]  URL: [email or web address]  Date: 07/11/2025  Prepared by: Donna Kc  Exercises  - Supine Heel Slide with Strap  - 2-3 x daily - 7 x weekly - 3 sets - 10 reps - Hold 5-10 seconds  - Long Sitting Quad Set  - 2-3 x daily - 7 x weekly - 3 sets - 10 reps - 5 seconds hold  - Active Straight Leg Raise with Quad Set  - 2-3 x daily - 7 x weekly - 3 sets - 10 reps  - Seated Hamstring Curl with Anchored Resistance  - 1-2 x daily - 7 x weekly - 3 sets - 10 reps  - Seated Knee Extension with Resistance  - 1-2 x daily - 7 x weekly - 3 sets - 10 reps    ASSESSEMENT  PT Assessment  PT Assessment Results: Decreased strength, Decreased range of motion, Decreased endurance, Impaired balance, Decreased mobility, Pain  Rehab Prognosis: Good  Evaluation/Treatment Tolerance: Patient tolerated treatment well  Strengths: Ability to acquire knowledge, Access to adaptive/assistive products, Attitude of self, Capable of completing ADLs semi/independent, Insight into problems, Rehab experience, Support and attitude of living partners, Support of extended family/friends  Pt would benefit from skilled physical therapy to address the deficits listed above in order to improve R knee ROM, strength, and muscular endurance to maximize functional mobility and IND to reduce risk of falls as a result of poor knee strength or impaired knee ROM for ambulation and stair negotiation.     EDUCATION  Outpatient Education  Individual(s) Educated: Patient  Education Provided: Anatomy, Body Mechanics, Fall Risk, Home Exercise Program, POC, Signs/Symptoms of Infection  Equipment: Thera-Band (Level 3 (Green))  Community Resources: [facility] Handout  Risk and Benefits Discussed with Patient/Caregiver/Other: yes  Patient/Caregiver Demonstrated Understanding: yes  Plan of Care Discussed and Agreed Upon: yes  Patient Response to Education: Patient/Caregiver Verbalized Understanding of  Information, Patient/Caregiver Performed Return Demonstration of Exercises/Activities, Patient/Caregiver Asked Appropriate Questions    PLAN  Treatment/Interventions: Education/ Instruction, Gait training, Manual therapy, Neuromuscular re-education, Taping techniques, Therapeutic activities, Therapeutic exercises  PT Plan: Skilled PT  PT Frequency: 2 times per week  Duration: 6-8 weeks (from IE 7/11/25)  Onset Date: 06/11/25  Certification Period Start Date: 07/01/25  Certification Period End Date: 07/01/26  Number of Treatments Authorized: Med Jeronimo  Rehab Potential: Good  Plan of Care Agreement: Patient    Goals:  Active       PT Problem       Patient will ambulate greater than or equal to 300 foot for short community distances without assistive device with good heel toe gait pattern to demonstrate improvements in functional mobility and endurance       Start:  07/11/25    Expected End:  10/11/25            Patient will achieve right knee ROM of  0-120 degrees for gait mechanics and stair negotiation       Start:  07/11/25    Expected End:  10/11/25            Patient will achieve right knee strength of 5/5 to improve R knee stability in order to return to PLOF       Start:  07/11/25    Expected End:  10/11/25            Patient will demonstrate independence in home program for support of progression       Start:  07/11/25    Expected End:  10/11/25            Patient will report pain of less than or equal to 3/10 over two consecutive treatment sessions demonstrating a reduction of overall pain       Start:  07/11/25    Expected End:  10/11/25            Patient will show a significant change in LEFS patient reported outcome tool to greater than or equal to 31 to demonstrate subjective improvement       Start:  07/11/25    Expected End:  10/11/25

## 2025-07-14 ENCOUNTER — TELEPHONE (OUTPATIENT)
Dept: ORTHOPEDIC SURGERY | Facility: HOSPITAL | Age: 45
End: 2025-07-14
Payer: MEDICARE

## 2025-07-14 DIAGNOSIS — Z96.659 ASEPTIC LOOSENING OF PROSTHETIC KNEE, INITIAL ENCOUNTER: Primary | ICD-10-CM

## 2025-07-14 DIAGNOSIS — T84.038A ASEPTIC LOOSENING OF PROSTHETIC KNEE, INITIAL ENCOUNTER: Primary | ICD-10-CM

## 2025-07-14 RX ORDER — OXYCODONE HYDROCHLORIDE 5 MG/1
5 TABLET ORAL EVERY 6 HOURS PRN
Qty: 28 TABLET | Refills: 0 | Status: SHIPPED | OUTPATIENT
Start: 2025-07-14 | End: 2025-07-21

## 2025-07-14 NOTE — TELEPHONE ENCOUNTER
Patient called in stating she had Aseptic loosening of prosthetic knee right sidecompleted on 6/11/2025 and is requesting a refill of oxycodone, states she has 1 pill left. Please advise.     Pharmacy: [facility] in [place]

## 2025-07-16 ENCOUNTER — TREATMENT (OUTPATIENT)
Dept: PHYSICAL THERAPY | Facility: HOSPITAL | Age: 45
End: 2025-07-16
Payer: MEDICARE

## 2025-07-16 DIAGNOSIS — M25.661 IMPAIRED RANGE OF MOTION OF RIGHT KNEE: ICD-10-CM

## 2025-07-16 DIAGNOSIS — T84.038D ASEPTIC LOOSENING OF PROSTHETIC KNEE, SUBSEQUENT ENCOUNTER: ICD-10-CM

## 2025-07-16 DIAGNOSIS — Z96.659 ASEPTIC LOOSENING OF PROSTHETIC KNEE, SUBSEQUENT ENCOUNTER: ICD-10-CM

## 2025-07-16 PROCEDURE — 97110 THERAPEUTIC EXERCISES: CPT | Mod: GP,CQ

## 2025-07-16 PROCEDURE — 97140 MANUAL THERAPY 1/> REGIONS: CPT | Mod: GP,CQ

## 2025-07-16 ASSESSMENT — PAIN SCALES - GENERAL: PAINLEVEL_OUTOF10: 4

## 2025-07-16 ASSESSMENT — PAIN - FUNCTIONAL ASSESSMENT: PAIN_FUNCTIONAL_ASSESSMENT: 0-10

## 2025-07-16 ASSESSMENT — PAIN DESCRIPTION - DESCRIPTORS: DESCRIPTORS: SHARP

## 2025-07-16 NOTE — PROGRESS NOTES
Physical Therapy    Physical Therapy Treatment    Patient Name: Chloe Oliveran  MRN: [id number]  : 1980  Today's Date: 2025  Time Calculation  Start Time: 1420  Stop Time: 1505  Time Calculation (min): 45 min    PT Therapeutic Procedures Time Entry  Therapeutic Exercise Time Entry: 35  Manual Therapy Time Entry: 10          VISIT:# 2    Current Problem  Problem List Items Addressed This Visit           ICD-10-CM    Aseptic loosening of prosthetic knee, subsequent encounter T84.038D, Z96.659    Impaired range of motion of right knee M25.661        Subjective   Reason for Referral: R TKA Revision 25  Referred By: Ramya Rendon DO     Pt name and  confirmed at the beginning of the session. Pt states that she is feeling some pain in her R knee today. She arrives using her FWW for ambulation but brought her SPC to practice with. She states that at home she does not use an A/D. She has had no falls since her last visit. She is compliant with her HEP.    Pain  Pain Assessment: 0-10  0-10 (Numeric) Pain Score: 4  Pain Location: Knee  Pain Orientation: Right  Pain Descriptors: Sharp  Pain Frequency: Constant/continuous  Clinical Progression: Gradually improving       Objective    R knee PROM: 0-111*             Precautions  Precautions  STEADI Fall Risk Score (The score of 4 or more indicates an increased risk of falling): 8  Medical Precautions:  (Asthma, Emphysema/COPD, Lung Disease, Osteoporosis, OA, Psoriatic Arthritis, Lupus, Hx of L pelvic fx, R TKA revision 25, L hip replacement ~1 year ago, schizophrenia)      Treatments:     EXERCISES       Date 2025              VISIT # #2 # # #    REPS REPS REPS REPS          Nustep L7 10 min             Shuttle  DLP Next      Shuttle SLP Next      Shuttle HR Next             Qhip Flexion 10# 2x10 Frederick      Qhip Abduction 10# 2x10 Frederick      Qhip Extension 10# 2x10 Frederick      Qhip  TKE              Q Quad       Q Hamstring               Step ups  "  FW/lateral 1x10 ea FW      Eccentric step downs  FW/lateral 1x10 ea FW/LAT      Lunges       Squat to chair              Gait training SPC with 2 point step to pattern; 150 ft x 2      Stairs 4 steps up and down; practiced proper gait pattern with Frederick HR and single HR and SPC.             RB stretch 3x30\"H      HS stretch              PROM R knee 0-111*      Manual  10 min             HEP Reviewed        HEP  Access Code: [email or web address]  URL: [email or web address]  Date: 07/11/2025  Prepared by: Donna Kc  Exercises  - Supine Heel Slide with Strap  - 2-3 x daily - 7 x weekly - 3 sets - 10 reps - Hold 5-10 seconds  - Long Sitting Quad Set  - 2-3 x daily - 7 x weekly - 3 sets - 10 reps - 5 seconds hold  - Active Straight Leg Raise with Quad Set  - 2-3 x daily - 7 x weekly - 3 sets - 10 reps  - Seated Hamstring Curl with Anchored Resistance  - 1-2 x daily - 7 x weekly - 3 sets - 10 reps  - Seated Knee Extension with Resistance  - 1-2 x daily - 7 x weekly - 3 sets - 10 reps                      Assessment:   At the end of the session pt stated that her pain had risen to a 6/10. She was having increased pain w/ PROM R knee flexion. She stated that the pain was sharp with overpressure. She felt the pain slightly superior and inferior to her R patella. Despite the increase in pain pt was able to tolerate PROM well and saw a slight increase in her PROM measurement from the initial evaluation. She completed all other exercises without complaints of increased pain.         Plan:   Monitor pt's reaction to treatment and continue to progress as tolerated.    OP PT Plan  Treatment/Interventions: Education/ Instruction, Gait training, Manual therapy, Neuromuscular re-education, Taping techniques, Therapeutic activities, Therapeutic exercises  PT Plan: Skilled PT  PT Frequency: 2 times per week  Duration: 6-8 weeks (from IE 7/11/25)  Onset Date: 06/11/25  Certification Period Start Date: " 07/01/25  Certification Period End Date: 07/01/26  Number of Treatments Authorized: Med Nec  Rehab Potential: Good  Plan of Care Agreement: Patient    Goals:  Active       PT Problem       Patient will ambulate greater than or equal to 300 foot for short community distances without assistive device with good heel toe gait pattern to demonstrate improvements in functional mobility and endurance       Start:  07/11/25    Expected End:  10/11/25            Patient will achieve right knee ROM of  0-120 degrees for gait mechanics and stair negotiation       Start:  07/11/25    Expected End:  10/11/25            Patient will achieve right knee strength of 5/5 to improve R knee stability in order to return to PLOF       Start:  07/11/25    Expected End:  10/11/25            Patient will demonstrate independence in home program for support of progression       Start:  07/11/25    Expected End:  10/11/25            Patient will report pain of less than or equal to 3/10 over two consecutive treatment sessions demonstrating a reduction of overall pain       Start:  07/11/25    Expected End:  10/11/25            Patient will show a significant change in LEFS patient reported outcome tool to greater than or equal to 31 to demonstrate subjective improvement       Start:  07/11/25    Expected End:  10/11/25

## 2025-07-18 ENCOUNTER — DOCUMENTATION (OUTPATIENT)
Dept: PHYSICAL THERAPY | Facility: HOSPITAL | Age: 45
End: 2025-07-18
Payer: MEDICARE

## 2025-07-18 ENCOUNTER — APPOINTMENT (OUTPATIENT)
Dept: PHYSICAL THERAPY | Facility: HOSPITAL | Age: 45
End: 2025-07-18
Payer: MEDICARE

## 2025-07-18 NOTE — PROGRESS NOTES
Physical Therapy                 Therapy Communication Note    Patient Name: Chloe Oliveran  MRN: [id number]  : 1980  Today's Date: 2025     Discipline: Physical Therapy    Missed Time: Cancel    Missed Visit Reason:   Appointment canceled and rescheduled by patient to 2025 due to illness.

## 2025-07-25 ENCOUNTER — APPOINTMENT (OUTPATIENT)
Dept: PHYSICAL THERAPY | Facility: HOSPITAL | Age: 45
End: 2025-07-25
Payer: MEDICARE

## 2025-07-25 DIAGNOSIS — Z96.659 ASEPTIC LOOSENING OF PROSTHETIC KNEE, SUBSEQUENT ENCOUNTER: ICD-10-CM

## 2025-07-25 DIAGNOSIS — T84.038D ASEPTIC LOOSENING OF PROSTHETIC KNEE, SUBSEQUENT ENCOUNTER: ICD-10-CM

## 2025-07-25 DIAGNOSIS — M25.661 IMPAIRED RANGE OF MOTION OF RIGHT KNEE: ICD-10-CM

## 2025-07-25 PROCEDURE — 97140 MANUAL THERAPY 1/> REGIONS: CPT | Mod: GP,CQ

## 2025-07-25 PROCEDURE — 97110 THERAPEUTIC EXERCISES: CPT | Mod: GP,CQ

## 2025-07-25 ASSESSMENT — PAIN SCALES - GENERAL
PAINLEVEL_OUTOF10: 4
PAINLEVEL_OUTOF10: 1

## 2025-07-25 ASSESSMENT — PAIN DESCRIPTION - DESCRIPTORS
DESCRIPTORS: SORE
DESCRIPTORS: SORE

## 2025-07-25 ASSESSMENT — PAIN - FUNCTIONAL ASSESSMENT: PAIN_FUNCTIONAL_ASSESSMENT: 0-10

## 2025-07-25 NOTE — PROGRESS NOTES
Physical Therapy    Physical Therapy Treatment    Patient Name: Chloe Mclean  MRN: [id number]  : 1980  Today's Date: 2025  Time Calculation  Start Time: 915  Stop Time: 1000  Time Calculation (min): 45 min    PT Therapeutic Procedures Time Entry  Therapeutic Exercise Time Entry: 35  Manual Therapy Time Entry: 10          VISIT:# 3    Current Problem  Problem List Items Addressed This Visit           ICD-10-CM    Aseptic loosening of prosthetic knee, subsequent encounter T84.038D, Z96.659    Impaired range of motion of right knee M25.661        Subjective   Reason for Referral: R TKA Revision 25  Referred By: Ramya Rendon DO     Pt arrives today stating that she is having some soreness in her R knee today as well as her L knee. She states that lately the pain in her L knee has been worse than in her R knee. Pt states that she has been doing pool exercises in the shallow end at home. She states that she feels like it is helping tremendously as she is able to tolerate more time on her feet in a gravity reduced environment. She has had no falls. She is compliant with her HEP.    Pain  Pain Assessment: 0-10  0-10 (Numeric) Pain Score: 1  Pain Location: Knee  Pain Orientation: Right  Pain Descriptors: Sore  Pain Frequency: Constant/continuous  Clinical Progression: Gradually improving  Multiple Pain Sites: Two  Pain Score 2: 4  Pain Location 2: Knee  Pain Orientation 2: Left  Pain Descriptors 2: Sore    Objective    R knee ROM   AROM: 0-114*   PROM: 0-118*     Pt ambulates today w/ FWW. She states that the SPC is frustrating her as she struggles with correct hand placement and gait pattern. Pt was advised to bring in SPC to next session so she can get more practice in the clinic.      Precautions  Precautions  STEADI Fall Risk Score (The score of 4 or more indicates an increased risk of falling): 8  Medical Precautions:  (Asthma, Emphysema/COPD, Lung Disease, Osteoporosis, OA, Psoriatic Arthritis,  "Lupus, Hx of L pelvic fx, R TKA revision 6/11/25, L hip replacement ~1 year ago, schizophrenia)      Treatments:     EXERCISES       Date 7/16/2025 7/25/2025             VISIT # #2 #3 # #    REPS REPS REPS REPS          Nustep L7 10 min L5 5 min, L3 5 min            Shuttle  DLP Next 5B 2x10     Shuttle SLP Next 4B 2x10 Frederick     Shuttle HR Next 5B 2x10            Qhip Flexion 10# 2x10 Frederick 10# 2x10 Frederick     Qhip Abduction 10# 2x10 Frederick 10# 2x10 Frederick     Qhip Extension 10# 2x10 Frederick 10# 2x10 Frederick     Qhip  TKE              Q Quad       Q Hamstring               Step ups   FW/lateral 1x10 ea FW 4 inch 1x10 RLE FW/LAT     Eccentric step downs  FW/lateral 1x10 ea FW/LAT 4 inch 1x10 RLE FW/LAT     Lunges       Squat to chair              Gait training SPC with 2 point step to pattern; 150 ft x 2      Stairs 4 steps up and down; practiced proper gait pattern with Frederick HR and single HR and SPC.             RB stretch 3x30\"H 3x30'H     HS stretch              PROM R knee 0-111* R knee AROM 0-114* PROM 0-118*     Manual  10 min 10 min            HEP Reviewed        HEP  Access Code: [email or web address]  URL: [email or web address]  Date: 07/11/2025  Prepared by: Donna Kc  Exercises  - Supine Heel Slide with Strap  - 2-3 x daily - 7 x weekly - 3 sets - 10 reps - Hold 5-10 seconds  - Long Sitting Quad Set  - 2-3 x daily - 7 x weekly - 3 sets - 10 reps - 5 seconds hold  - Active Straight Leg Raise with Quad Set  - 2-3 x daily - 7 x weekly - 3 sets - 10 reps  - Seated Hamstring Curl with Anchored Resistance  - 1-2 x daily - 7 x weekly - 3 sets - 10 reps  - Seated Knee Extension with Resistance  - 1-2 x daily - 7 x weekly - 3 sets - 10 reps                      Assessment:   At the end of the session pt reported no pain in her R knee but still had pain in her L knee around a 4/10. Despite the pain in her L knee, pt tolerated treatment well today. She saw an improvement in her R knee ROM as noted above. She completed all " exercises with minimal difficulty.       Plan:   Monitor pt response to treatment and progress LE strength and ROM as tolerated.    OP PT Plan  Treatment/Interventions: Education/ Instruction, Gait training, Manual therapy, Neuromuscular re-education, Taping techniques, Therapeutic activities, Therapeutic exercises  PT Plan: Skilled PT  PT Frequency: 2 times per week  Duration: 6-8 weeks (from IE 7/11/25)  Onset Date: 06/11/25  Certification Period Start Date: 07/01/25  Certification Period End Date: 07/01/26  Number of Treatments Authorized: Med Jeronimo  Rehab Potential: Good  Plan of Care Agreement: Patient    Goals:  Active       PT Problem       Patient will ambulate greater than or equal to 300 foot for short community distances without assistive device with good heel toe gait pattern to demonstrate improvements in functional mobility and endurance       Start:  07/11/25    Expected End:  10/11/25            Patient will achieve right knee ROM of  0-120 degrees for gait mechanics and stair negotiation       Start:  07/11/25    Expected End:  10/11/25            Patient will achieve right knee strength of 5/5 to improve R knee stability in order to return to PLOF       Start:  07/11/25    Expected End:  10/11/25            Patient will demonstrate independence in home program for support of progression       Start:  07/11/25    Expected End:  10/11/25            Patient will report pain of less than or equal to 3/10 over two consecutive treatment sessions demonstrating a reduction of overall pain       Start:  07/11/25    Expected End:  10/11/25            Patient will show a significant change in LEFS patient reported outcome tool to greater than or equal to 31 to demonstrate subjective improvement       Start:  07/11/25    Expected End:  10/11/25

## 2025-07-28 ENCOUNTER — TREATMENT (OUTPATIENT)
Dept: PHYSICAL THERAPY | Facility: HOSPITAL | Age: 45
End: 2025-07-28
Payer: MEDICARE

## 2025-07-28 DIAGNOSIS — M25.661 IMPAIRED RANGE OF MOTION OF RIGHT KNEE: ICD-10-CM

## 2025-07-28 DIAGNOSIS — Z96.659 ASEPTIC LOOSENING OF PROSTHETIC KNEE, SUBSEQUENT ENCOUNTER: ICD-10-CM

## 2025-07-28 DIAGNOSIS — T84.038D ASEPTIC LOOSENING OF PROSTHETIC KNEE, SUBSEQUENT ENCOUNTER: ICD-10-CM

## 2025-07-28 PROCEDURE — 97110 THERAPEUTIC EXERCISES: CPT | Mod: GP,CQ

## 2025-07-28 PROCEDURE — 97140 MANUAL THERAPY 1/> REGIONS: CPT | Mod: GP,CQ

## 2025-07-28 PROCEDURE — 97116 GAIT TRAINING THERAPY: CPT | Mod: GP,CQ

## 2025-07-28 ASSESSMENT — PAIN SCALES - GENERAL: PAINLEVEL_OUTOF10: 0 - NO PAIN

## 2025-07-28 ASSESSMENT — PAIN - FUNCTIONAL ASSESSMENT: PAIN_FUNCTIONAL_ASSESSMENT: 0-10

## 2025-07-28 NOTE — PROGRESS NOTES
Physical Therapy    Physical Therapy Treatment    Patient Name: Chloe Mclean  MRN: [id number]  : 1980  Today's Date: 2025  Time Calculation  Start Time: 1307  Stop Time: 1345  Time Calculation (min): 38 min    PT Therapeutic Procedures Time Entry  Therapeutic Exercise Time Entry: 18  Gait Training Time Entry: 10  Manual Therapy Time Entry: 10          VISIT:# 4    Current Problem  Problem List Items Addressed This Visit           ICD-10-CM    Aseptic loosening of prosthetic knee, subsequent encounter T84.038D, Z96.659    Impaired range of motion of right knee M25.661        Subjective   Reason for Referral: R TKA Revision 25  Referred By: Ramya Rendon DO     Pt arrives today stating that she is not feeling any pain in her R knee currently. She states that she spent a lot of time on her feet today and is feeling increased stiffness/tightness. She has had no recent falls.     Pt arrived 7 minutes late today but was able to be accommodated.     Pain  Pain Assessment: 0-10  0-10 (Numeric) Pain Score: 0 - No pain  Pain Location: Knee  Pain Orientation: Right       Objective    R knee PROM: 0-111*      Pt states that she had been on her feet a lot today and felt stiffness because of prolonged standing     Pt ambulated 250ft with SPC. Focused on maintaining good 2 point gait pattern. Began w/ step to but quickly progressed to step through pattern     Precautions  Precautions  STEADI Fall Risk Score (The score of 4 or more indicates an increased risk of falling): 8  Medical Precautions:  (Asthma, Emphysema/COPD, Lung Disease, Osteoporosis, OA, Psoriatic Arthritis, Lupus, Hx of L pelvic fx, R TKA revision 25, L hip replacement ~1 year ago, schizophrenia)      Treatments:     EXERCISES       Date 2025            VISIT # #2 #3 #4 #    REPS REPS REPS REPS          Nustep L7 10 min L5 5 min, L3 5 min            Shuttle  DLP Next 5B 2x10 5B 2x10    Shuttle SLP Next 4B 2x10 Frederick  "4B 2x10 ea    Shuttle HR Next 5B 2x10 5B 2x10           Qhip Flexion 10# 2x10 Frederick 10# 2x10 Frederick 15# 2x10 Frederick    Qhip Abduction 10# 2x10 Frederick 10# 2x10 Frederick 15# 2x10 Frederick    Qhip Extension 10# 2x10 Frederick 10# 2x10 Frederick 15# 2x10 Frederick    Qhip  TKE              Q Quad       Q Hamstring               Step ups   FW/lateral 1x10 ea FW 4 inch 1x10 RLE FW/LAT 6 inch 1x10 ea FW/LAT    Eccentric step downs  FW/lateral 1x10 ea FW/LAT 4 inch 1x10 RLE FW/LAT 4 inch 1x10 ea FW/LAT    Lunges       Squat to chair              Gait training SPC with 2 point step to pattern; 150 ft x 2  SPC with 2 point step through pattern; 250ft    Stairs 4 steps up and down; practiced proper gait pattern with Frederick HR and single HR and SPC.             RB stretch 3x30\"H 3x30'H     HS stretch              PROM R knee 0-111* R knee AROM 0-114* PROM 0-118* R knee PROM 0-111*    Manual  10 min 10 min 10 min           HEP Reviewed        HEP  Access Code: [email or web address]  URL: [email or web address]  Date: 07/11/2025  Prepared by: Donna Kc  Exercises  - Supine Heel Slide with Strap  - 2-3 x daily - 7 x weekly - 3 sets - 10 reps - Hold 5-10 seconds  - Long Sitting Quad Set  - 2-3 x daily - 7 x weekly - 3 sets - 10 reps - 5 seconds hold  - Active Straight Leg Raise with Quad Set  - 2-3 x daily - 7 x weekly - 3 sets - 10 reps  - Seated Hamstring Curl with Anchored Resistance  - 1-2 x daily - 7 x weekly - 3 sets - 10 reps  - Seated Knee Extension with Resistance  - 1-2 x daily - 7 x weekly - 3 sets - 10 reps                         Assessment:   Pt reported no pain in her R knee at the end of the session but was experiencing pain when PROM knee flexion was performed. When her knee was being bent, she stated that the pain lidya to a 4/10 but 0/10 when at rest. Pt had a decrease in ROM measurement due to increased soreness with PROM and stiffness upon arrival.       Plan:   Monitor pt's response to today's treatment and progress as tolerated.    OP PT " Plan  Treatment/Interventions: Education/ Instruction, Gait training, Manual therapy, Neuromuscular re-education, Taping techniques, Therapeutic activities, Therapeutic exercises  PT Plan: Skilled PT  PT Frequency: 2 times per week  Duration: 6-8 weeks (from IE 7/11/25)  Onset Date: 06/11/25  Certification Period Start Date: 07/01/25  Certification Period End Date: 07/01/26  Number of Treatments Authorized: Med [facility]  Rehab Potential: Good  Plan of Care Agreement: Patient    Goals:  Active       PT Problem       Patient will ambulate greater than or equal to 300 foot for short community distances without assistive device with good heel toe gait pattern to demonstrate improvements in functional mobility and endurance       Start:  07/11/25    Expected End:  10/11/25            Patient will achieve right knee ROM of  0-120 degrees for gait mechanics and stair negotiation       Start:  07/11/25    Expected End:  10/11/25            Patient will achieve right knee strength of 5/5 to improve R knee stability in order to return to PLOF       Start:  07/11/25    Expected End:  10/11/25            Patient will demonstrate independence in home program for support of progression       Start:  07/11/25    Expected End:  10/11/25            Patient will report pain of less than or equal to 3/10 over two consecutive treatment sessions demonstrating a reduction of overall pain       Start:  07/11/25    Expected End:  10/11/25            Patient will show a significant change in LEFS patient reported outcome tool to greater than or equal to 31 to demonstrate subjective improvement       Start:  07/11/25    Expected End:  10/11/25

## 2025-07-30 ENCOUNTER — TREATMENT (OUTPATIENT)
Dept: PHYSICAL THERAPY | Facility: HOSPITAL | Age: 45
End: 2025-07-30
Payer: MEDICARE

## 2025-07-30 DIAGNOSIS — M25.661 IMPAIRED RANGE OF MOTION OF RIGHT KNEE: Primary | ICD-10-CM

## 2025-07-30 DIAGNOSIS — T84.038D ASEPTIC LOOSENING OF PROSTHETIC KNEE, SUBSEQUENT ENCOUNTER: ICD-10-CM

## 2025-07-30 DIAGNOSIS — Z96.659 ASEPTIC LOOSENING OF PROSTHETIC KNEE, SUBSEQUENT ENCOUNTER: ICD-10-CM

## 2025-07-30 PROCEDURE — 97140 MANUAL THERAPY 1/> REGIONS: CPT | Mod: GP

## 2025-07-30 PROCEDURE — 97110 THERAPEUTIC EXERCISES: CPT | Mod: GP

## 2025-07-30 ASSESSMENT — PAIN SCALES - GENERAL
PAINLEVEL_OUTOF10: 1
PAINLEVEL_OUTOF10: 2

## 2025-07-30 ASSESSMENT — PAIN DESCRIPTION - DESCRIPTORS
DESCRIPTORS: SORE
DESCRIPTORS: SORE

## 2025-07-30 ASSESSMENT — PAIN - FUNCTIONAL ASSESSMENT: PAIN_FUNCTIONAL_ASSESSMENT: 0-10

## 2025-07-30 NOTE — PROGRESS NOTES
Physical Therapy    Physical Therapy Treatment    Patient Name: Chloe Oliveran  MRN: [id number]  Today's Date: 7/30/2025  Time Calculation  Start Time: 1530  Stop Time: 1613  Time Calculation (min): 43 min  PT Therapeutic Procedures Time Entry  Therapeutic Exercise Time Entry: 33  Manual Therapy Time Entry: 10  Auth Visit Dates: 7/11/25-7/11/26  VISIT# 5    Current Problem  Problem List Items Addressed This Visit           ICD-10-CM    Aseptic loosening of prosthetic knee, subsequent encounter T84.038D, Z96.659    Impaired range of motion of right knee - Primary M25.661       Subjective   Pt declines falls since last session. Pt reports compliance with HEP intermittently stating she does them frequently in the pool noting she has primarily been swimming as form of exercise. Pt notes she is walking with SPC out in community but at home without assistive device.Pt states she is able to negotiate her 14 steps x 1 without pain or discomfort just is cautious.     Precautions  Precautions  STEADI Fall Risk Score (The score of 4 or more indicates an increased risk of falling): 8  Medical Precautions:  (Asthma, Emphysema/COPD, Lung Disease, Osteoporosis, OA, Psoriatic Arthritis, Lupus, Hx of L pelvic fx, R TKA revision 6/11/25, L hip replacement ~1 year ago, schizophrenia)    Pain  Pain Assessment: 0-10  0-10 (Numeric) Pain Score: 1  Pain Location: Knee  Pain Orientation: Right  Pain Descriptors: Sore  Pain Frequency: Intermittent  Multiple Pain Sites: Two  Pain Score 2: 2  Pain Location 2: Knee  Pain Orientation 2: Left  Pain Descriptors 2: Sore    Objective   Ambulating with SPC in RUE with step through gait pattern     Treatments:  EXERCISES       Date 7/16/2025 7/25/2025 7/28/2025 7/30/25          VISIT # #2 #3 #4 #5    REPS REPS REPS REPS          Nustep L7 10 min L5 5 min, L3 5 min  L5 10 min           Shuttle  DLP Next 5B 2x10 5B 2x10    Shuttle SLP Next 4B 2x10 Frederick 4B 2x10 ea    Shuttle HR Next 5B 2x10 5B 2x10      "      Qhip Flexion 10# 2x10 Frederick 10# 2x10 Frederick 15# 2x10 Frederick 15# 2x10 frederick   Progress NEXT   Qhip Abduction 10# 2x10 Frederick 10# 2x10 Frederick 15# 2x10 Frederick 15# 2x10 frederick   Progress NEXT   Qhip Extension 10# 2x10 Frederick 10# 2x10 Frederick 15# 2x10 Frederick 15# 2x10 frederick   Progress NEXT   Qhip  TKE    RLE   25# 2x10 3\" H          Q Quad       Q Hamstring               Step ups   FW/lateral 1x10 ea FW 4 inch 1x10 RLE FW/LAT 6 inch 1x10 ea FW/LAT 6 inch 1x10 ea FW/LAT   Eccentric step downs  FW/lateral 1x10 ea FW/LAT 4 inch 1x10 RLE FW/LAT 4 inch 1x10 ea FW/LAT 4 inch 1x10 ea   FW/LAT   Lunges       Squat to chair              Gait training SPC with 2 point step to pattern; 150 ft x 2  SPC with 2 point step through pattern; 250ft    Stairs 4 steps up and down; practiced proper gait pattern with Frederick HR and single HR and SPC.             RB stretch 3x30\"H 3x30'H  3x30\" H   HS stretch              PROM R knee 0-111* R knee AROM 0-114* PROM 0-118* R knee PROM 0-111* R knee   AROM 108 degrees  PROM 115 degrees   Manual  10 min 10 min 10 min 10 min           HEP Reviewed        HEP  Access Code: [email or web address]  URL: [email or web address]  Date: 07/11/2025  Prepared by: Donna Kc  Exercises  - Supine Heel Slide with Strap  - 2-3 x daily - 7 x weekly - 3 sets - 10 reps - Hold 5-10 seconds  - Long Sitting Quad Set  - 2-3 x daily - 7 x weekly - 3 sets - 10 reps - 5 seconds hold  - Active Straight Leg Raise with Quad Set  - 2-3 x daily - 7 x weekly - 3 sets - 10 reps  - Seated Hamstring Curl with Anchored Resistance  - 1-2 x daily - 7 x weekly - 3 sets - 10 reps  - Seated Knee Extension with Resistance  - 1-2 x daily - 7 x weekly - 3 sets - 10 reps    Assessment:  Pt tolerates treatment session well reporting 1/10 sorenesss at end of session. Ambulates throughout session without assistive device intermittent R antalgic gait pattern. Improvement in R knee PROM this date to 115 degrees.    OP EDUCATION:  Outpatient Education  Individual(s) " Educated: Patient  Education Provided: Anatomy, Body Mechanics, Fall Risk, Home Exercise Program, POC, Signs/Symptoms of Infection  Equipment:  (previously issued Level 3 (Green))  Risk and Benefits Discussed with Patient/Caregiver/Other: yes  Plan of Care Discussed and Agreed Upon: yes  Patient Response to Education: Patient/Caregiver Verbalized Understanding of Information, Patient/Caregiver Performed Return Demonstration of Exercises/Activities, Patient/Caregiver Asked Appropriate Questions    Plan:  Continue to progress RLE knee strength and mobility for return to PLOF  OP PT Plan  Treatment/Interventions: Education/ Instruction, Gait training, Manual therapy, Neuromuscular re-education, Taping techniques, Therapeutic activities, Therapeutic exercises  PT Plan: Skilled PT  PT Frequency: 2 times per week  Duration: 6-8 weeks (from IE 7/11/25)  Onset Date: 06/11/25  Certification Period Start Date: 07/01/25  Certification Period End Date: 07/01/26  Number of Treatments Authorized: Med [facility]  Rehab Potential: Good  Plan of Care Agreement: Patient    Goals:  Active       PT Problem       Patient will ambulate greater than or equal to 300 foot for short community distances without assistive device with good heel toe gait pattern to demonstrate improvements in functional mobility and endurance (Progressing)       Start:  07/11/25    Expected End:  10/11/25            Patient will achieve right knee ROM of  0-120 degrees for gait mechanics and stair negotiation (Progressing)       Start:  07/11/25    Expected End:  10/11/25            Patient will achieve right knee strength of 5/5 to improve R knee stability in order to return to PLOF (Progressing)       Start:  07/11/25    Expected End:  10/11/25            Patient will demonstrate independence in home program for support of progression (Progressing)       Start:  07/11/25    Expected End:  10/11/25            Patient will report pain of less than or equal to 3/10 over  two consecutive treatment sessions demonstrating a reduction of overall pain (Progressing)       Start:  07/11/25    Expected End:  10/11/25            Patient will show a significant change in LEFS patient reported outcome tool to greater than or equal to 31 to demonstrate subjective improvement       Start:  07/11/25    Expected End:  10/11/25

## 2025-08-06 ENCOUNTER — TREATMENT (OUTPATIENT)
Dept: PHYSICAL THERAPY | Facility: HOSPITAL | Age: 45
End: 2025-08-06
Payer: MEDICARE

## 2025-08-06 DIAGNOSIS — Z96.659 ASEPTIC LOOSENING OF PROSTHETIC KNEE, SUBSEQUENT ENCOUNTER: ICD-10-CM

## 2025-08-06 DIAGNOSIS — M25.661 IMPAIRED RANGE OF MOTION OF RIGHT KNEE: Primary | ICD-10-CM

## 2025-08-06 DIAGNOSIS — T84.038D ASEPTIC LOOSENING OF PROSTHETIC KNEE, SUBSEQUENT ENCOUNTER: ICD-10-CM

## 2025-08-06 PROCEDURE — 97110 THERAPEUTIC EXERCISES: CPT | Mod: GP

## 2025-08-06 PROCEDURE — 97140 MANUAL THERAPY 1/> REGIONS: CPT | Mod: GP

## 2025-08-06 ASSESSMENT — PAIN - FUNCTIONAL ASSESSMENT: PAIN_FUNCTIONAL_ASSESSMENT: 0-10

## 2025-08-06 ASSESSMENT — PAIN SCALES - GENERAL
PAINLEVEL_OUTOF10: 0 - NO PAIN
PAINLEVEL_OUTOF10: 10 - WORST POSSIBLE PAIN

## 2025-08-06 NOTE — PROGRESS NOTES
Physical Therapy    Physical Therapy Treatment - Recheck     Patient Name: Chloe Mclean  MRN: [id number]  Today's Date: 8/6/2025  Time Calculation  Start Time: 1432  Stop Time: 1517  Time Calculation (min): 45 min  PT Therapeutic Procedures Time Entry  Therapeutic Exercise Time Entry: 25  Manual Therapy Time Entry: 10  Therapeutic Activity Time Entry: 10  Auth Visit Dates: 7/11/25-7/11/26  VISIT# 6    Current Problem  Problem List Items Addressed This Visit           ICD-10-CM    Aseptic loosening of prosthetic knee, subsequent encounter T84.038D, Z96.659    Impaired range of motion of right knee - Primary M25.661       Subjective   Pt declines falls since last session. Pt reports compliance with HEP intermittently but does focus on heel slides. Reports her R knee is feeling great but her L knee is continuing to bother her a bit with a HA this date.     Precautions  Precautions  STEADI Fall Risk Score (The score of 4 or more indicates an increased risk of falling): 8  Medical Precautions:  (Asthma, Emphysema/COPD, Lung Disease, Osteoporosis, OA, Psoriatic Arthritis, Lupus, Hx of L pelvic fx, R TKA revision 6/11/25, L hip replacement ~1 year ago, schizophrenia)    Pain  Pain Assessment: 0-10  0-10 (Numeric) Pain Score: 0 - No pain  Pain Location: Knee  Pain Orientation: Right  Multiple Pain Sites: Two  Pain Score 2: 10 - Worst possible pain  Pain Location 2: Head  Pain Descriptors 2:  (HA, pt reports due to sinuses)    Objective   Recheck this date.     Outcome Measures:  Other Measures  Lower Extremity Funtional Score (LEFS): 50     Lower Extremity Strength:  LE strength not listed below is 5/5  MMT 5/5 max  LEFT RIGHT   Hip Flexion 4 5   Hip Abduction 4+ 4+   Knee Extension 5 4+   Knee Flexion 5 5     KNEE AROM:    LEFT RIGHT   Knee Flexion 135 degrees AROM 108 degrees  AAROM 113 degrees   PROM    Knee Extension 0 degrees 0 degrees     Gait mechanics: Ambulates IND with and without SPC. Intermittent R antalgic  "gait pattern. Without significant trunk SB. Without postural instability ot LOB throughout.     Incision: Unremarkable without abnormal redness, swelling, temperature, or exudate. Some palpable scar tissue under scar. Scar mobilization performed with pt reporting \"feels good\" specifically at inferior aspect scar frederick.    Single Leg Raise: x10 RLE without extensor lag present woth good quad activation present.     Treatments:  EXERCISES        Date 7/16/2025 7/25/2025 7/28/2025 7/30/25 8/6/25           VISIT # #2 #3 #4 #5 #6    REPS REPS REPS REPS REPS        + recheck 10 minutes    Nustep L7 10 min L5 5 min, L3 5 min  L5 10 min  L6 10 min            Shuttle  DLP Next 5B 2x10 5B 2x10     Shuttle SLP Next 4B 2x10 Frederick 4B 2x10 ea     Shuttle HR Next 5B 2x10 5B 2x10             Qhip Flexion 10# 2x10 Frederick 10# 2x10 Frederick 15# 2x10 Frederick 15# 2x10 frederick   Progress NEXT 25# 1x10 frederick    Qhip Abduction 10# 2x10 Frederick 10# 2x10 Frederick 15# 2x10 Frederick 15# 2x10 frederick   Progress NEXT 25# 1x10 frederick    Qhip Extension 10# 2x10 Frederick 10# 2x10 Frederick 15# 2x10 Frederick 15# 2x10 frederick   Progress NEXT 25# 1x10 frederick    Qhip  TKE    RLE   25# 2x10 3\" H            Q Quad        Q Hamstring                 Step ups   FW/lateral 1x10 ea FW 4 inch 1x10 RLE FW/LAT 6 inch 1x10 ea FW/LAT 6 inch 1x10 ea FW/LAT    Eccentric step downs  FW/lateral 1x10 ea FW/LAT 4 inch 1x10 RLE FW/LAT 4 inch 1x10 ea FW/LAT 4 inch 1x10 ea   FW/LAT 6 inch 2x10 ea LAT  6 inch 1x10 ea FW   Lunges        Squat to chair        Heel slides     X10 5\" H           Gait training SPC with 2 point step to pattern; 150 ft x 2  SPC with 2 point step through pattern; 250ft     Stairs 4 steps up and down; practiced proper gait pattern with Frederick HR and single HR and SPC.               RB stretch 3x30\"H 3x30'H  3x30\" H 3x30\" H   HS stretch                PROM R knee 0-111* R knee AROM 0-114* PROM 0-118* R knee PROM 0-111* R knee   AROM 108 degrees  PROM 115 degrees R knee   AROM 107 degrees  AAROM 113 degrees  PROM " 115 degrees   Manual  10 min 10 min 10 min 10 min  10 min PROM/scar mobilization           HEP Reviewed    Reviewed     HEP  Access Code: [email or web address]  URL: [email or web address]  Date: 07/11/2025  Prepared by: Donna Kc  Exercises  - Supine Heel Slide with Strap  - 2-3 x daily - 7 x weekly - 3 sets - 10 reps - Hold 5-10 seconds  - Long Sitting Quad Set  - 2-3 x daily - 7 x weekly - 3 sets - 10 reps - 5 seconds hold  - Active Straight Leg Raise with Quad Set  - 2-3 x daily - 7 x weekly - 3 sets - 10 reps  - Seated Hamstring Curl with Anchored Resistance  - 1-2 x daily - 7 x weekly - 3 sets - 10 reps  - Seated Knee Extension with Resistance  - 1-2 x daily - 7 x weekly - 3 sets - 10 reps    Assessment:  Pt tolerates treatment session well reporting 0/10 pain at end of session. Pt is a 45 year old F who has participated in 4 follow up PT sessions since  on 7/11/25 for R knee TKA revision 6/11/25. At this time, pt is demonstrating improvements in AAROM and PROM in R knee. Scar mobilization performed with pt noting reduced tightness at scar and increased mobility. Knee ROM 0-115 degrees. Improving kaylie LE strength with R knee strength greater than or equal to 4+/5. Pt ambulating with SPC into session, ambulates throughout session without assistive device with intermittent R antalgic gait pattern without LOB or significant trunk lean present. 2/6 goals set by PT at  met this date with remaining 4/6 goals demonstrating good progress at this time. At this time, pt would benefit from continued skilled PT services in order to continue to improve R knee ROM, kaylie LE strength, activity tolerance, muscular endurance, and gait mechanics to return to PLOF.    OP EDUCATION:  Outpatient Education  Individual(s) Educated: Patient  Education Provided: Anatomy, Body Mechanics, Fall Risk, Home Exercise Program, POC, Signs/Symptoms of Infection  Equipment:  (previously issued Level 3 (Green))  Risk and Benefits  Discussed with Patient/Caregiver/Other: yes  Plan of Care Discussed and Agreed Upon: yes  Patient Response to Education: Patient/Caregiver Verbalized Understanding of Information, Patient/Caregiver Performed Return Demonstration of Exercises/Activities, Patient/Caregiver Asked Appropriate Questions  Self scar mobilization, importance of holding knee flexion stretch with discomfort but tolerable for maximum results    Plan:  Continue to progress RLE knee strength and mobility for return to PLOF  OP PT Plan  Treatment/Interventions: Education/ Instruction, Gait training, Manual therapy, Neuromuscular re-education, Taping techniques, Therapeutic activities, Therapeutic exercises  PT Plan: Skilled PT  PT Frequency: 2 times per week  Duration: 6-8 weeks (from IE 7/11/25)  Onset Date: 06/11/25  Certification Period Start Date: 07/01/25  Certification Period End Date: 07/01/26  Number of Treatments Authorized: Med [facility]  Rehab Potential: Good  Plan of Care Agreement: Patient    Goals:  Active       PT Problem       Patient will ambulate greater than or equal to 300 foot for short community distances without assistive device with good heel toe gait pattern to demonstrate improvements in functional mobility and endurance (Progressing)       Start:  07/11/25    Expected End:  10/11/25         Goal Note       Pt ambulates throughout session without assistive device with intermittent R antalgic gait pattern.              Patient will achieve right knee ROM of  0-120 degrees for gait mechanics and stair negotiation (Progressing)       Start:  07/11/25    Expected End:  10/11/25         Goal Note       R knee PROM 0-115 degrees              Patient will achieve right knee strength of 5/5 to improve R knee stability in order to return to PLOF (Progressing)       Start:  07/11/25    Expected End:  10/11/25         Goal Note       R knee strength 5/5 with exception of knee extension 4+/5.               Patient will demonstrate  independence in home program for support of progression (Progressing)       Start:  07/11/25    Expected End:  10/11/25         Goal Note       Pt reports intermittent compliance with HEP. States she feels she may not be holding stretch long enough or far enough. Will continue to monitor throughout POC.               Patient will report pain of less than or equal to 3/10 over two consecutive treatment sessions demonstrating a reduction of overall pain (Met)       Start:  07/11/25    Expected End:  10/11/25    Resolved:  08/06/25      Goal Note       Pt has continued to report 0-1/10 pain at end of last 2-3 treatment sessions in R knee.               Patient will show a significant change in LEFS patient reported outcome tool to greater than or equal to 31 to demonstrate subjective improvement (Met)       Start:  07/11/25    Expected End:  10/11/25    Resolved:  08/06/25      Goal Note       LEFS score of 50 this date.

## 2025-08-11 ENCOUNTER — APPOINTMENT (OUTPATIENT)
Dept: PHYSICAL THERAPY | Facility: HOSPITAL | Age: 45
End: 2025-08-11
Payer: MEDICARE

## 2025-08-11 ENCOUNTER — DOCUMENTATION (OUTPATIENT)
Dept: PHYSICAL THERAPY | Facility: HOSPITAL | Age: 45
End: 2025-08-11
Payer: MEDICARE

## 2025-08-13 ENCOUNTER — TREATMENT (OUTPATIENT)
Dept: PHYSICAL THERAPY | Facility: HOSPITAL | Age: 45
End: 2025-08-13
Payer: MEDICARE

## 2025-08-13 DIAGNOSIS — T84.038D ASEPTIC LOOSENING OF PROSTHETIC KNEE, SUBSEQUENT ENCOUNTER: ICD-10-CM

## 2025-08-13 DIAGNOSIS — Z96.659 ASEPTIC LOOSENING OF PROSTHETIC KNEE, SUBSEQUENT ENCOUNTER: ICD-10-CM

## 2025-08-13 DIAGNOSIS — M25.661 IMPAIRED RANGE OF MOTION OF RIGHT KNEE: ICD-10-CM

## 2025-08-13 PROCEDURE — 97140 MANUAL THERAPY 1/> REGIONS: CPT | Mod: GP,CQ

## 2025-08-13 PROCEDURE — 97110 THERAPEUTIC EXERCISES: CPT | Mod: GP,CQ

## 2025-08-13 ASSESSMENT — PAIN - FUNCTIONAL ASSESSMENT: PAIN_FUNCTIONAL_ASSESSMENT: 0-10

## 2025-08-13 ASSESSMENT — PAIN SCALES - GENERAL: PAINLEVEL_OUTOF10: 0 - NO PAIN

## 2025-08-18 ENCOUNTER — TREATMENT (OUTPATIENT)
Dept: PHYSICAL THERAPY | Facility: HOSPITAL | Age: 45
End: 2025-08-18
Payer: MEDICARE

## 2025-08-18 DIAGNOSIS — Z96.659 ASEPTIC LOOSENING OF PROSTHETIC KNEE, SUBSEQUENT ENCOUNTER: ICD-10-CM

## 2025-08-18 DIAGNOSIS — T84.038D ASEPTIC LOOSENING OF PROSTHETIC KNEE, SUBSEQUENT ENCOUNTER: ICD-10-CM

## 2025-08-18 DIAGNOSIS — M25.661 IMPAIRED RANGE OF MOTION OF RIGHT KNEE: ICD-10-CM

## 2025-08-18 PROCEDURE — 97110 THERAPEUTIC EXERCISES: CPT | Mod: GP,CQ

## 2025-08-18 PROCEDURE — 97140 MANUAL THERAPY 1/> REGIONS: CPT | Mod: GP,CQ

## 2025-08-18 ASSESSMENT — PAIN - FUNCTIONAL ASSESSMENT: PAIN_FUNCTIONAL_ASSESSMENT: 0-10

## 2025-08-18 ASSESSMENT — PAIN SCALES - GENERAL: PAINLEVEL_OUTOF10: 1

## 2025-08-19 ENCOUNTER — APPOINTMENT (OUTPATIENT)
Dept: ORTHOPEDIC SURGERY | Facility: CLINIC | Age: 45
End: 2025-08-19
Payer: MEDICARE

## 2025-08-19 ENCOUNTER — HOSPITAL ENCOUNTER (OUTPATIENT)
Dept: RADIOLOGY | Facility: CLINIC | Age: 45
Discharge: HOME | End: 2025-08-19
Payer: MEDICARE

## 2025-08-19 VITALS — BODY MASS INDEX: 39.4 KG/M2 | HEIGHT: 68 IN | WEIGHT: 260 LBS

## 2025-08-19 DIAGNOSIS — Z96.659 ASEPTIC LOOSENING OF PROSTHETIC KNEE, INITIAL ENCOUNTER: ICD-10-CM

## 2025-08-19 DIAGNOSIS — T84.038A ASEPTIC LOOSENING OF PROSTHETIC KNEE, INITIAL ENCOUNTER: ICD-10-CM

## 2025-08-19 DIAGNOSIS — M17.12 PRIMARY OSTEOARTHRITIS OF LEFT KNEE: Primary | ICD-10-CM

## 2025-08-19 PROCEDURE — 73560 X-RAY EXAM OF KNEE 1 OR 2: CPT | Mod: RT

## 2025-08-19 PROCEDURE — 3008F BODY MASS INDEX DOCD: CPT | Performed by: ORTHOPAEDIC SURGERY

## 2025-08-19 PROCEDURE — 20610 DRAIN/INJ JOINT/BURSA W/O US: CPT | Performed by: ORTHOPAEDIC SURGERY

## 2025-08-19 PROCEDURE — 99024 POSTOP FOLLOW-UP VISIT: CPT | Performed by: ORTHOPAEDIC SURGERY

## 2025-08-19 PROCEDURE — 73560 X-RAY EXAM OF KNEE 1 OR 2: CPT | Mod: RIGHT SIDE | Performed by: RADIOLOGY

## 2025-08-19 RX ORDER — LIDOCAINE HYDROCHLORIDE 10 MG/ML
4 INJECTION, SOLUTION INFILTRATION; PERINEURAL
Status: COMPLETED | OUTPATIENT
Start: 2025-08-19 | End: 2025-08-19

## 2025-08-19 RX ORDER — TRIAMCINOLONE ACETONIDE 40 MG/ML
40 INJECTION, SUSPENSION INTRA-ARTICULAR; INTRAMUSCULAR
Status: COMPLETED | OUTPATIENT
Start: 2025-08-19 | End: 2025-08-19

## 2025-08-19 RX ADMIN — TRIAMCINOLONE ACETONIDE 40 MG: 40 INJECTION, SUSPENSION INTRA-ARTICULAR; INTRAMUSCULAR at 13:38

## 2025-08-19 RX ADMIN — LIDOCAINE HYDROCHLORIDE 4 ML: 10 INJECTION, SOLUTION INFILTRATION; PERINEURAL at 13:38

## 2025-08-19 ASSESSMENT — PAIN - FUNCTIONAL ASSESSMENT: PAIN_FUNCTIONAL_ASSESSMENT: NO/DENIES PAIN

## 2025-08-20 ENCOUNTER — APPOINTMENT (OUTPATIENT)
Dept: PHYSICAL THERAPY | Facility: HOSPITAL | Age: 45
End: 2025-08-20
Payer: MEDICARE

## 2025-08-20 ENCOUNTER — DOCUMENTATION (OUTPATIENT)
Dept: PHYSICAL THERAPY | Facility: HOSPITAL | Age: 45
End: 2025-08-20
Payer: MEDICARE

## 2025-08-25 ENCOUNTER — TREATMENT (OUTPATIENT)
Dept: PHYSICAL THERAPY | Facility: HOSPITAL | Age: 45
End: 2025-08-25
Payer: MEDICARE

## 2025-08-25 DIAGNOSIS — Z96.659 ASEPTIC LOOSENING OF PROSTHETIC KNEE, SUBSEQUENT ENCOUNTER: ICD-10-CM

## 2025-08-25 DIAGNOSIS — T84.038D ASEPTIC LOOSENING OF PROSTHETIC KNEE, SUBSEQUENT ENCOUNTER: ICD-10-CM

## 2025-08-25 DIAGNOSIS — M25.661 IMPAIRED RANGE OF MOTION OF RIGHT KNEE: Primary | ICD-10-CM

## 2025-08-25 PROCEDURE — 97110 THERAPEUTIC EXERCISES: CPT | Mod: GP | Performed by: PHYSICAL THERAPIST

## 2025-08-25 ASSESSMENT — PAIN - FUNCTIONAL ASSESSMENT: PAIN_FUNCTIONAL_ASSESSMENT: 0-10

## 2025-08-25 ASSESSMENT — PAIN SCALES - GENERAL: PAINLEVEL_OUTOF10: 0 - NO PAIN

## 2025-10-22 ENCOUNTER — APPOINTMENT (OUTPATIENT)
Dept: PRIMARY CARE | Facility: CLINIC | Age: 45
End: 2025-10-22
Payer: MEDICARE

## 2026-06-16 ENCOUNTER — APPOINTMENT (OUTPATIENT)
Dept: ORTHOPEDIC SURGERY | Facility: CLINIC | Age: 46
End: 2026-06-16
Payer: MEDICARE

## (undated) DEVICE — SYRINGE, 50 CC, LUER LOCK

## (undated) DEVICE — BLADE, OSCILLATING/SAGITTAL, 31MM X 9MM

## (undated) DEVICE — DRAPE, INCISE, ANTIMICROBIAL, IOBAN 2, STERI DRAPE, 23 X 33 IN, DISPOSABLE, STERILE

## (undated) DEVICE — GLOVE, SURGICAL, PROTEXIS PI , 7.5, PF, LF

## (undated) DEVICE — BLADE, SAW, OSC TIP, FALCON, 20 X 1.27 X 90MM

## (undated) DEVICE — PAD, GROUNDING, ELECTROSURGICAL, W/9 FT CABLE, POLYHESIVE II, ADULT, LF

## (undated) DEVICE — HANDPIECE, INTERPULSE, W/FAN SPRAY TIP AND SUCTION TUBE

## (undated) DEVICE — DRAPE, SHEET, THREE QUARTER, FAN FOLD, 57 X 77 IN

## (undated) DEVICE — BOWL, MIXING, BREAKAWAY FEMORAL NOZZLE, ADVANCED, MEDIUM PRESSURIZER, W/180 GM CEMENT CARTRIDGE

## (undated) DEVICE — GLOVE, SURGICAL, PROTEXIS PI ORTHO, 8.0, PF, LF

## (undated) DEVICE — CATHETER, SUCTION, CATH-N-GLOVE, PEEL POUCH, 18 FR

## (undated) DEVICE — MANIFOLD, 4 PORT NEPTUNE STANDARD

## (undated) DEVICE — SOLUTION, IRRIGATION, SODIUM CHLORIDE 0.9%, 1000 ML, POUR BOTTLE

## (undated) DEVICE — Device

## (undated) DEVICE — COVER HANDLE LIGHT, STERIS, BLUE, STERILE

## (undated) DEVICE — TRACKING KIT, HIP PROCEDURES, VIZADISC

## (undated) DEVICE — CONTAINER, SPECIMEN, 4 OZ, OR PEEL PACK, STERILE

## (undated) DEVICE — PIN, HEADLESS 1/8 X 3.5 FLUTE 4/PK STERILE

## (undated) DEVICE — BLADE, PRECISION 2.0 CARTRIDGE, 25 X 1.27 X 105

## (undated) DEVICE — SOLUTION, POVIDONE IODINE 10%, 0.75 OZ, NS

## (undated) DEVICE — NEEDLE, SAFETY, 22 G X 1.5 IN

## (undated) DEVICE — SUTURE, VICRYL, 2-0, 36 IN, CT-1, UNDYED

## (undated) DEVICE — WOUND SYSTEM, DEBRIDEMENT & CLEANING, O.R DUOPAK

## (undated) DEVICE — SYRINGE, 30 CC, LUER LOCK

## (undated) DEVICE — CATHETER TRAY, SURESTEP, 14FR, PRECONNECTED DRAIN BAG, PVC

## (undated) DEVICE — STRYKER RIO DRAPE KIT (FORMERLY MFR'D BY MAKO)

## (undated) DEVICE — NAIL, HEADED, 1-1/2 IN

## (undated) DEVICE — BANDAGE, COFLEX, 6 X 5 YDS, TAN, STERILE, LF

## (undated) DEVICE — BANDAGE, COMPRESSION, EZE-BAND, DBL, 6 X 11 YDS

## (undated) DEVICE — SUTURE, VICRYL, 1, 36 IN, CT-1, UNDYED

## (undated) DEVICE — ELECTRODE, ELECTROSURGICAL, BLADE, EXTENDED

## (undated) DEVICE — SUTURE, STRATAFIX, SPIRAL MONOCRYL PLUS, 3-0, PS-1 45CM, UNDYED

## (undated) DEVICE — SUTURE, VICRYL, 2-0, 18 IN CP-2, UNDYED

## (undated) DEVICE — IRRIGATION SYSTEM, WOUND, PULSAVAC PLUS

## (undated) DEVICE — DRAPE, SHEET, U, STERI DRAPE, 47 X 51 IN, DISPOSABLE, STERILE

## (undated) DEVICE — BANDAGE, ESMARK, 6 IN X 12 FT

## (undated) DEVICE — HOOD, SURGICAL, FLYTE HYBRID

## (undated) DEVICE — PAD, KNEE PATIENT, DEMAYO, DISP, STERILE

## (undated) DEVICE — GLOVE, SURGICAL, PI ORTHO PRO, BIOGEL, SZ 8.0

## (undated) DEVICE — CLOSURE SYSTEM, DERMABOND, PRINEO, 22CM, STERILE

## (undated) DEVICE — KIT, TOTAL JOINT, CUSTOM, PORTAGE

## (undated) DEVICE — GLOVE, PROTEXIS PI CLASSIC, SZ-7.5, PF, LF

## (undated) DEVICE — DRAPE, INSTRUMENT, W/POUCH, STERI DRAPE, 7 X 11 IN, DISPOSABLE, STERILE

## (undated) DEVICE — PADDING, CAST, SOFT, 6 X 4YD, LF

## (undated) DEVICE — DRAPE, INCISE, ANTIMICROBIAL, IOBAN 2, LARGE, 17 X 23 IN, DISPOSABLE, STERILE

## (undated) DEVICE — APPLICATOR, CHLORAPREP, W/ORANGE TINT, 26ML

## (undated) DEVICE — DRAPE, SHEET, 17 X 23 IN

## (undated) DEVICE — HOOD, SURGICAL, FLYTE, T7 PLUS, PEEL AWAY SHIELD

## (undated) DEVICE — SUTURE, STRATAFIX, SYMMETRIC, SIZE 1, 45CM, KNOTLESS

## (undated) DEVICE — TIP, SUCTION, FRAZIER, EXTRA WIDE, W/O CONTROL VENT, W/OBTURATOR, 18 FR, DISPOSABLE

## (undated) DEVICE — SOLUTION, INJECTION, SODIUM CHLORIDE 9%, 500ML

## (undated) DEVICE — DRAPE, EXTREMITY, HEAVY DUTY, W/ARMBOARD COVERS